# Patient Record
Sex: FEMALE | Race: WHITE | Employment: FULL TIME | URBAN - METROPOLITAN AREA
[De-identification: names, ages, dates, MRNs, and addresses within clinical notes are randomized per-mention and may not be internally consistent; named-entity substitution may affect disease eponyms.]

---

## 2017-05-02 ENCOUNTER — OFFICE VISIT (OUTPATIENT)
Dept: FAMILY MEDICINE CLINIC | Facility: CLINIC | Age: 27
End: 2017-05-02

## 2017-05-02 VITALS
HEIGHT: 62 IN | RESPIRATION RATE: 16 BRPM | DIASTOLIC BLOOD PRESSURE: 70 MMHG | BODY MASS INDEX: 22.08 KG/M2 | HEART RATE: 88 BPM | TEMPERATURE: 98 F | SYSTOLIC BLOOD PRESSURE: 118 MMHG | WEIGHT: 120 LBS

## 2017-05-02 DIAGNOSIS — Z13.220 SCREENING FOR LIPOID DISORDERS: ICD-10-CM

## 2017-05-02 DIAGNOSIS — Z13.29 SCREENING FOR THYROID DISORDER: ICD-10-CM

## 2017-05-02 DIAGNOSIS — L29.9 PRURITUS, UNSPECIFIED: ICD-10-CM

## 2017-05-02 DIAGNOSIS — Z00.00 WELL WOMAN EXAM WITHOUT GYNECOLOGICAL EXAM: Primary | ICD-10-CM

## 2017-05-02 PROCEDURE — 99395 PREV VISIT EST AGE 18-39: CPT | Performed by: FAMILY MEDICINE

## 2017-05-02 RX ORDER — CETIRIZINE HYDROCHLORIDE 10 MG/1
10 TABLET ORAL DAILY
COMMUNITY
End: 2018-07-17 | Stop reason: ALTCHOICE

## 2017-05-02 RX ORDER — ESCITALOPRAM OXALATE 10 MG/1
10 TABLET ORAL DAILY
COMMUNITY

## 2017-05-02 RX ORDER — ESCITALOPRAM OXALATE 5 MG/1
5 TABLET ORAL DAILY
COMMUNITY

## 2017-05-02 NOTE — PROGRESS NOTES
Basim Boland is a 27year old female. HPI:   ***    Current Outpatient Prescriptions:  escitalopram 5 MG Oral Tab Take 5 mg by mouth daily. Disp:  Rfl:    escitalopram 10 MG Oral Tab Take 10 mg by mouth daily.  Disp:  Rfl:    cetirizine 10 MG Oral

## 2017-05-03 NOTE — PROGRESS NOTES
HPI:   Kimberlyn Cadet is a 27year old female who presents for a complete physical exam. Symptoms: periods are regular. Patient complains of recurrent pruritic papules on her arms and legs. She states her mother did have the same condition.   She is Seasonal allergies    • Acute sinusitis, unspecified    • Acute tonsillitis    • Otalgia, unspecified    • Scoliosis (and kyphoscoliosis), idiopathic    • Acute tonsillitis 12/1/11          Past Surgical History    TONSILLECTOMY        Family History   Pro HSM or tenderness  : Done by GYN   MUSCULOSKELETAL: back is not tender,FROM of the back  EXTREMITIES: no cyanosis, clubbing or edema  NEURO: Oriented times three,cranial nerves are intact,motor and sensory are grossly intact    ASSESSMENT AND PLAN:   Rebekah Berumen

## 2017-05-13 ENCOUNTER — LAB ENCOUNTER (OUTPATIENT)
Dept: LAB | Age: 27
End: 2017-05-13
Attending: FAMILY MEDICINE
Payer: COMMERCIAL

## 2017-05-13 DIAGNOSIS — Z13.220 SCREENING FOR LIPOID DISORDERS: ICD-10-CM

## 2017-05-13 DIAGNOSIS — L29.9 PRURITUS, UNSPECIFIED: ICD-10-CM

## 2017-05-13 DIAGNOSIS — Z13.29 SCREENING FOR THYROID DISORDER: ICD-10-CM

## 2017-05-13 PROCEDURE — 36415 COLL VENOUS BLD VENIPUNCTURE: CPT | Performed by: FAMILY MEDICINE

## 2017-05-13 PROCEDURE — 80050 GENERAL HEALTH PANEL: CPT | Performed by: FAMILY MEDICINE

## 2017-05-13 PROCEDURE — 80061 LIPID PANEL: CPT | Performed by: FAMILY MEDICINE

## 2017-05-13 PROCEDURE — 84439 ASSAY OF FREE THYROXINE: CPT | Performed by: FAMILY MEDICINE

## 2017-05-13 PROCEDURE — 86003 ALLG SPEC IGE CRUDE XTRC EA: CPT | Performed by: FAMILY MEDICINE

## 2017-06-07 ENCOUNTER — TELEPHONE (OUTPATIENT)
Dept: FAMILY MEDICINE CLINIC | Facility: CLINIC | Age: 27
End: 2017-06-07

## 2017-06-07 NOTE — TELEPHONE ENCOUNTER
Pt walked in with 1-page [facility] 201 form to be completed by PCP. Pt needs completed ASAP. Pt is going to [facility]) next week and would like to have it done prior to this.  Pt is asking that the [place] be [place] [facility]

## 2017-08-12 ENCOUNTER — APPOINTMENT (OUTPATIENT)
Dept: CT IMAGING | Age: 27
End: 2017-08-12
Attending: PHYSICIAN ASSISTANT
Payer: COMMERCIAL

## 2017-08-12 ENCOUNTER — HOSPITAL ENCOUNTER (OUTPATIENT)
Age: 27
Discharge: HOME | End: 2017-08-12
Payer: COMMERCIAL

## 2017-08-12 VITALS
HEART RATE: 76 BPM | DIASTOLIC BLOOD PRESSURE: 76 MMHG | OXYGEN SATURATION: 100 % | RESPIRATION RATE: 18 BRPM | TEMPERATURE: 99 F | SYSTOLIC BLOOD PRESSURE: 113 MMHG

## 2017-08-12 DIAGNOSIS — R40.20 LOSS OF CONSCIOUSNESS (HCC): ICD-10-CM

## 2017-08-12 DIAGNOSIS — S01.81XA CHIN LACERATION, INITIAL ENCOUNTER: ICD-10-CM

## 2017-08-12 DIAGNOSIS — R55 SYNCOPE, VASOVAGAL: Primary | ICD-10-CM

## 2017-08-12 LAB
#LYMPHOCYTE IC: 2.2 X10ˆ3/UL (ref 0.9–3.2)
#MXD IC: 0.7 X10ˆ3/UL (ref 0.1–1)
#NEUTROPHIL IC: 5.8 X10ˆ3/UL (ref 1.3–6.7)
CREAT SERPL-MCNC: 0.8 MG/DL (ref 0.4–1)
GLUCOSE BLD-MCNC: 92 MG/DL (ref 65–99)
HCT IC: 39.4 % (ref 37–54)
HGB IC: 13.1 G/DL (ref 12–16)
ISTAT BLOOD GAS TCO2: 25 MMOL/L (ref 22–32)
ISTAT BUN: 9 MG/DL (ref 8–20)
ISTAT CHLORIDE: 99 MMOL/L (ref 101–111)
ISTAT HEMATOCRIT: 41 % (ref 37–54)
ISTAT IONIZED CALCIUM: 1.3 MMOL/L (ref 1.12–1.32)
ISTAT POTASSIUM: 3.9 MMOL/L (ref 3.6–5.1)
ISTAT SODIUM: 136 MMOL/L (ref 136–144)
LYMPHOCYTES NFR BLD AUTO: 25.6 %
MCH IC: 31.2 PG (ref 27–33.2)
MCHC IC: 33.2 G/DL (ref 31–37)
MCV IC: 93.8 FL (ref 81–100)
MIXED CELL %: 8.4 %
NEUTROPHILS NFR BLD AUTO: 66 %
PLT IC: 233 X10ˆ3/UL (ref 150–450)
POCT BILIRUBIN URINE: NEGATIVE
POCT BLOOD URINE: NEGATIVE
POCT GLUCOSE URINE: NEGATIVE MG/DL
POCT KETONE URINE: NEGATIVE MG/DL
POCT NITRITE URINE: NEGATIVE
POCT PH URINE: 5.5 (ref 5–8)
POCT PROTEIN URINE: NEGATIVE MG/DL
POCT SPECIFIC GRAVITY URINE: 1.02
POCT URINE CLARITY: CLEAR
POCT URINE COLOR: YELLOW
POCT URINE PREGNANCY: NEGATIVE
POCT UROBILINOGEN URINE: 0.2 MG/DL
RBC IC: 4.2 X10ˆ6/UL (ref 3.8–5.1)
WBC IC: 8.7 X10ˆ3/UL (ref 4–13)

## 2017-08-12 PROCEDURE — 70450 CT HEAD/BRAIN W/O DYE: CPT | Performed by: PHYSICIAN ASSISTANT

## 2017-08-12 PROCEDURE — 99215 OFFICE O/P EST HI 40 MIN: CPT

## 2017-08-12 PROCEDURE — 93005 ELECTROCARDIOGRAM TRACING: CPT

## 2017-08-12 PROCEDURE — 81025 URINE PREGNANCY TEST: CPT | Performed by: PHYSICIAN ASSISTANT

## 2017-08-12 PROCEDURE — 87186 SC STD MICRODIL/AGAR DIL: CPT | Performed by: PHYSICIAN ASSISTANT

## 2017-08-12 PROCEDURE — 12011 RPR F/E/E/N/L/M 2.5 CM/<: CPT

## 2017-08-12 PROCEDURE — 87077 CULTURE AEROBIC IDENTIFY: CPT | Performed by: PHYSICIAN ASSISTANT

## 2017-08-12 PROCEDURE — 80047 BASIC METABLC PNL IONIZED CA: CPT

## 2017-08-12 PROCEDURE — 87086 URINE CULTURE/COLONY COUNT: CPT | Performed by: PHYSICIAN ASSISTANT

## 2017-08-12 PROCEDURE — 93010 ELECTROCARDIOGRAM REPORT: CPT

## 2017-08-12 PROCEDURE — 81002 URINALYSIS NONAUTO W/O SCOPE: CPT | Performed by: PHYSICIAN ASSISTANT

## 2017-08-12 PROCEDURE — 96360 HYDRATION IV INFUSION INIT: CPT

## 2017-08-12 PROCEDURE — 85025 COMPLETE CBC W/AUTO DIFF WBC: CPT | Performed by: PHYSICIAN ASSISTANT

## 2017-08-12 RX ORDER — SODIUM CHLORIDE 9 MG/ML
1000 INJECTION, SOLUTION INTRAVENOUS ONCE
Status: COMPLETED | OUTPATIENT
Start: 2017-08-12 | End: 2017-08-12

## 2017-08-12 NOTE — ED PROVIDER NOTES
Patient Seen in: [facility] END    History   Patient presents with:  Syncope (cardiovascular, neurologic)  Laceration Abrasion (integumentary)    Stated Complaint: Lost Concious/fell and injured chin    HPI    Santinokolton Kathleen is a 24yo F who disability [Other] [OTHER] Mother    • mental disability [Other] [OTHER] Maternal Grandfather        Smoking status: Never Smoker                                                              Smokeless tobacco: Never Used                      Alcohol use: N all four quadrants, no mass or organomegaly.  No rebound tenderness or guarding                Lymphatic:  No adenopathy  Skin/Hair/Nails:  Skin warm, dry +2 cm v shaped gaping laceration to chin   Neurologic:   Grossly intact, no cranial nerve deficits, 5/ Brain Or Head (90902)    Result Date: 8/12/2017  PROCEDURE:  CT BRAIN OR HEAD (52072)  COMPARISON:  None. INDICATIONS:  Lost consciousness/fell and injured chin  TECHNIQUE:  Noncontrast CT scanning is performed through the brain.  Dose reduction techniques and site. Verbal consent was obtained from the patient. The  laceration was anesthetized in the usual fashion with 2mL of 1% lidocaine without epinephrine.  The wound was irrigated with normal saline, draped and explored to its base with a gloved finge

## 2017-08-12 NOTE — ED INITIAL ASSESSMENT (HPI)
Pt was running on the treadmill for 3 mins and stepped to the side. (was on the bike for 20 mins prior). States she black out then. Unsure how long she was blacked out. Laceration to chin. No active bleeding. Happened 40 mins ago.   C/o pain to chin an

## 2017-08-13 NOTE — ED NOTES
Message left for the patient to return phone call for abnormal  lab result and the need for treatment. Phone number provided.

## 2017-08-13 NOTE — ED NOTES
Patient returned phone call and was notified of her positive preliminary urine culture results. Patient notified that the physician has ordered an antibiotic. Allergies verified. Patient instructed to start Macrobid 100 mg one bid for 5 days.  Patient instr

## 2017-08-15 NOTE — ED NOTES
Patient returned our call and patient is already taking her [address]. No further action needed.

## 2017-08-19 ENCOUNTER — HOSPITAL ENCOUNTER (OUTPATIENT)
Age: 27
Discharge: HOME | End: 2017-08-19
Payer: COMMERCIAL

## 2017-08-19 VITALS
TEMPERATURE: 98 F | OXYGEN SATURATION: 98 % | RESPIRATION RATE: 18 BRPM | HEART RATE: 88 BPM | SYSTOLIC BLOOD PRESSURE: 118 MMHG | DIASTOLIC BLOOD PRESSURE: 81 MMHG

## 2017-08-19 DIAGNOSIS — Z48.02 ENCOUNTER FOR REMOVAL OF SUTURES: Primary | ICD-10-CM

## 2017-08-19 NOTE — ED INITIAL ASSESSMENT (HPI)
Here her sutural removal in chin. Had in for 7 days. No drainage or redness to area. PA removed sutures and neosporin applied by PA.

## 2017-08-19 NOTE — ED PROVIDER NOTES
SUTURE REMOVAL PROCEDURE:  PROCEDURE: Removal of previously placed sutures  LOCATION OF SUTURES: chin  SUTURES PREVIOUSLY PLACED AT: [facility]    The wound demonstrates no evidence of infection with adequate tensile strength at the wound margins at this

## 2017-09-26 ENCOUNTER — TELEPHONE (OUTPATIENT)
Dept: FAMILY MEDICINE CLINIC | Facility: CLINIC | Age: 27
End: 2017-09-26

## 2017-09-26 NOTE — TELEPHONE ENCOUNTER
States she fainted this morning and is having dizziness. Fainted earlier in the month. Transferred to Triage.

## 2017-09-26 NOTE — TELEPHONE ENCOUNTER
Had episode of syncope this morning around 6:15 and was out for a few seconds. No nausea or vomiting. Dizziness and tingling in hands and upper body. Denies fevers and headaches.   Spoke with Beverly BEATTY who advised that pt could either go to I

## 2017-09-28 ENCOUNTER — OFFICE VISIT (OUTPATIENT)
Dept: FAMILY MEDICINE CLINIC | Facility: CLINIC | Age: 27
End: 2017-09-28

## 2017-09-28 VITALS
BODY MASS INDEX: 21.62 KG/M2 | HEIGHT: 63 IN | SYSTOLIC BLOOD PRESSURE: 116 MMHG | OXYGEN SATURATION: 96 % | WEIGHT: 122 LBS | TEMPERATURE: 99 F | HEART RATE: 98 BPM | DIASTOLIC BLOOD PRESSURE: 78 MMHG | RESPIRATION RATE: 16 BRPM

## 2017-09-28 DIAGNOSIS — R55 SYNCOPE, UNSPECIFIED SYNCOPE TYPE: Primary | ICD-10-CM

## 2017-09-28 PROBLEM — Z86.19 HISTORY OF COLD SORES: Status: ACTIVE | Noted: 2017-09-28

## 2017-09-28 PROBLEM — F41.9 ANXIETY: Status: ACTIVE | Noted: 2017-09-28

## 2017-09-28 PROBLEM — E78.1 HYPERTRIGLYCERIDEMIA: Status: ACTIVE | Noted: 2017-09-28

## 2017-09-28 LAB
APPEARANCE: CLEAR
CONTROL LINE PRESENT WITH A CLEAR BACKGROUND (YES/NO): YES YES/NO
MULTISTIX LOT#: NORMAL NUMERIC
PH, URINE: 7.5 (ref 4.5–8)
SPECIFIC GRAVITY: 1.01 (ref 1–1.03)
URINE-COLOR: YELLOW
UROBILINOGEN,SEMI-QN: 0.2 MG/DL (ref 0–1.9)

## 2017-09-28 PROCEDURE — 99214 OFFICE O/P EST MOD 30 MIN: CPT | Performed by: FAMILY MEDICINE

## 2017-09-28 PROCEDURE — 81025 URINE PREGNANCY TEST: CPT | Performed by: FAMILY MEDICINE

## 2017-09-28 PROCEDURE — 93000 ELECTROCARDIOGRAM COMPLETE: CPT | Performed by: FAMILY MEDICINE

## 2017-09-28 PROCEDURE — 81003 URINALYSIS AUTO W/O SCOPE: CPT | Performed by: FAMILY MEDICINE

## 2017-09-28 NOTE — PROGRESS NOTES
[facility] Office Note  Chief Complaint:   Patient presents with:  Fainting: times 2      HPI:   This is a 24year old female coming in for evaluation of fainting x 2 episodes. First one was back in 8/12/2017.  She acquired lacerat (yes/no) yes Yes/No   Kit Lot # [id number] Numeric   Kit Expiration Date 06-30-18 Date       Past Medical History:   Diagnosis Date   • Acute sinusitis, unspecified    • Acute tonsillitis    • Acute tonsillitis 12/1/11   • Anxiety    • Chronic tonsillitis skin dryness. CARDIOVASCULAR:  + chest pain, denies chest pressure, chest discomfort, palpitations, edema, dyspnea on exertion or at rest.  RESPIRATORY:  Denies shortness of breath, wheezing, cough or sputum.   GASTROINTESTINAL:  Denies abdominal pain, ezequiel bilterally, no rales/rhonchi/wheezing. CHEST: No tenderness. ABDOMEN:  Soft, nondistended, nontender, bowel sounds normal in all 4 quadrants, no masses, no hepatosplenomegaly. BACK: No tenderness, no spasm.   EXTREMITIES:  No edema, no cyanosis, no clubb

## 2017-09-28 NOTE — PATIENT INSTRUCTIONS
Possible Causes of Dizziness or Fainting  Dizziness and fainting can have many causes. Below are some examples of possible causes your healthcare provider will look to rule out.     Benign paroxysmal positional vertigo (BPPV)  BPPV results when calcium cr Additional causes for dizziness and fainting also exist. Talk to your healthcare provider for more information.     Date Last Reviewed: 10/6/2015  © 8171-6857 The [address] [place] [address], [address] [place] [address]. All rights reserved.  This i A heart problem can decrease the amount of oxygen-rich blood that reaches the brain. Heart trouble can be serious and even life threatening if not treated:  · A slow heart rate. Electrical signals tell the chambers of the heart when to pump.  But the signal Whatever the cause of syncope, it is important to be evaluated by your healthcare provider.  You may need to be seen by a cardiologist, neurologist, or an ear, nose, and throat specialist. Do not drive, operate heavy machinery, or participate in activities · Echocardiogram. This test takes pictures of your heart. It can show heart valve or heart function problems. Or it can reveal damage from a heart attack. · Electrophysiology studies (EPS).  These help your provider find weak or damaged electrical pathways One of the concerns with syncope is being injured from falling with a loss of consciousness. Finding a cause for syncope can help keep you safe from injury.  Do not drive a car, operate heavy machinery, or engage in activities that may result in injury if y · Rest today. You may go back to your normal activities when you are feeling back to normal. It is best to stay with someone who can check on you for the next 24 hours to watch for another episode of fainting.   · If you become lightheaded or dizzy, lie gila

## 2017-09-30 ENCOUNTER — HOSPITAL ENCOUNTER (OUTPATIENT)
Dept: ULTRASOUND IMAGING | Age: 27
Discharge: HOME | End: 2017-09-30
Attending: FAMILY MEDICINE
Payer: COMMERCIAL

## 2017-09-30 ENCOUNTER — LAB ENCOUNTER (OUTPATIENT)
Dept: LAB | Age: 27
End: 2017-09-30
Attending: FAMILY MEDICINE
Payer: COMMERCIAL

## 2017-09-30 DIAGNOSIS — R55 SYNCOPE, UNSPECIFIED SYNCOPE TYPE: ICD-10-CM

## 2017-09-30 PROCEDURE — 93880 EXTRACRANIAL BILAT STUDY: CPT | Performed by: FAMILY MEDICINE

## 2017-09-30 PROCEDURE — 80053 COMPREHEN METABOLIC PANEL: CPT | Performed by: FAMILY MEDICINE

## 2017-09-30 PROCEDURE — 36415 COLL VENOUS BLD VENIPUNCTURE: CPT | Performed by: FAMILY MEDICINE

## 2017-09-30 PROCEDURE — 85025 COMPLETE CBC W/AUTO DIFF WBC: CPT | Performed by: FAMILY MEDICINE

## 2017-10-20 ENCOUNTER — HOSPITAL ENCOUNTER (OUTPATIENT)
Dept: CV DIAGNOSTICS | Age: 27
Discharge: HOME | End: 2017-10-20
Attending: FAMILY MEDICINE
Payer: COMMERCIAL

## 2017-10-20 DIAGNOSIS — R55 SYNCOPE, UNSPECIFIED SYNCOPE TYPE: ICD-10-CM

## 2017-10-20 PROCEDURE — 93350 STRESS TTE ONLY: CPT | Performed by: FAMILY MEDICINE

## 2017-10-20 PROCEDURE — 93018 CV STRESS TEST I&R ONLY: CPT | Performed by: FAMILY MEDICINE

## 2017-10-20 PROCEDURE — 93017 CV STRESS TEST TRACING ONLY: CPT | Performed by: FAMILY MEDICINE

## 2017-11-13 ENCOUNTER — TELEPHONE (OUTPATIENT)
Dept: FAMILY MEDICINE CLINIC | Facility: CLINIC | Age: 27
End: 2017-11-13

## 2017-11-13 DIAGNOSIS — E78.9 ELEVATED SERUM CHOLESTEROL: Primary | ICD-10-CM

## 2017-11-13 NOTE — TELEPHONE ENCOUNTER
Pt had labs done for her annual wellness screening at work. Is concerned that her cholesterol was high and would like to discuss with clinical staff.     LDL: 134  Total: 220

## 2017-11-14 NOTE — TELEPHONE ENCOUNTER
Call back from pt-sts she just wanted input from dr Sherita Grullon re whether he recommends rechecking cholesterol or any other recommendations for now. \"this if first time levels are elevated. Total 220 and \".   Confirms family hx of elevated cholester

## 2017-11-14 NOTE — TELEPHONE ENCOUNTER
Call to pt-advised of dr murrell's recommendations. Informed will place edwrd fasting lab orders for 6 months. Patient voices understanding/agrees with plan/no further questions. **see future lab orders pended for approval** thanks!

## 2017-12-05 ENCOUNTER — OFFICE VISIT (OUTPATIENT)
Dept: FAMILY MEDICINE CLINIC | Facility: CLINIC | Age: 27
End: 2017-12-05

## 2017-12-05 VITALS
TEMPERATURE: 98 F | WEIGHT: 125 LBS | SYSTOLIC BLOOD PRESSURE: 110 MMHG | HEART RATE: 102 BPM | BODY MASS INDEX: 23 KG/M2 | DIASTOLIC BLOOD PRESSURE: 72 MMHG | OXYGEN SATURATION: 99 % | HEIGHT: 62 IN | RESPIRATION RATE: 16 BRPM

## 2017-12-05 DIAGNOSIS — B37.3 VAGINAL CANDIDA: ICD-10-CM

## 2017-12-05 DIAGNOSIS — J01.00 ACUTE MAXILLARY SINUSITIS, RECURRENCE NOT SPECIFIED: Primary | ICD-10-CM

## 2017-12-05 DIAGNOSIS — J01.10 ACUTE FRONTAL SINUSITIS, RECURRENCE NOT SPECIFIED: ICD-10-CM

## 2017-12-05 PROCEDURE — 99213 OFFICE O/P EST LOW 20 MIN: CPT | Performed by: NURSE PRACTITIONER

## 2017-12-05 RX ORDER — FLUCONAZOLE 150 MG/1
150 TABLET ORAL ONCE
Qty: 1 TABLET | Refills: 1 | Status: SHIPPED | OUTPATIENT
Start: 2017-12-05 | End: 2017-12-05

## 2017-12-05 RX ORDER — AMOXICILLIN AND CLAVULANATE POTASSIUM 875; 125 MG/1; MG/1
1 TABLET, FILM COATED ORAL 2 TIMES DAILY
Qty: 20 TABLET | Refills: 0 | Status: SHIPPED | OUTPATIENT
Start: 2017-12-05 | End: 2017-12-15

## 2017-12-05 NOTE — PATIENT INSTRUCTIONS
·  PLAN: Augmentin, take as directed. Finish all the medication even if you feel better. · Use back up birth control protection for 30 days after antibiotics completed.   · Probiotics or yogurt daily during antibiotic use will help decrease stomach upset

## 2017-12-05 NOTE — PROGRESS NOTES
HPI:   Barry Keen is a 24year old female who presents with ill symptoms for  3  weeks. Patient reports sore throat, congestion, cough with thick yellow colored sputum, OTC cold meds have not been helping, denies fever.  Has tried cold and sinus m shortness of breath with exertion, cough as above, no pattern to cough  CARDIOVASCULAR: denies chest pain on exertion  GI: no nausea or abdominal pain, appetite down  NEURO: denies headaches    EXAM:   /72   Pulse 102   Temp 97.9 °F (36.6 °C) (Oral) oz lukewarm water), use several times daily to help decrease swelling and pain. · Teaspoon of honey at bedtime to help soothe throat. Avoid spicy or citrus/acidic foods and drinks.   · Saline nasal spray to nostrils if needed to help remove drainage or co

## 2017-12-18 ENCOUNTER — OFFICE VISIT (OUTPATIENT)
Dept: FAMILY MEDICINE CLINIC | Facility: CLINIC | Age: 27
End: 2017-12-18

## 2017-12-18 ENCOUNTER — TELEPHONE (OUTPATIENT)
Dept: FAMILY MEDICINE CLINIC | Facility: CLINIC | Age: 27
End: 2017-12-18

## 2017-12-18 DIAGNOSIS — Z02.9 ENCOUNTER FOR ADMINISTRATIVE EXAMINATIONS: Primary | ICD-10-CM

## 2017-12-18 NOTE — TELEPHONE ENCOUNTER
Pt was on Augmentin. She finished it on Friday. She now  has a rash over her chest, back and neck that is getting worse. She called her dermatologist they advised she call her PCP. Pt has no facial swelling , tongue swelling or shortness of breath.  I advis

## 2017-12-18 NOTE — TELEPHONE ENCOUNTER
Patient was seen at [facility] on 12/5/17 and given Augmentin. Patient also saw her dermatologist and stated that they told her to call her primary regarding the rash she has. States it is spreading all over her chest, back and creeping up her neck.   Please adv

## 2017-12-19 NOTE — PROGRESS NOTES
Patient presents to walk in clinic with four day history of rash to torso. Rash began while taking Amoxicillin for illness. Amox completed. Patient notes itchy, red rash, began on chest and torso, has not spread. Has used hydrocortisone for self care.     A

## 2018-06-23 ENCOUNTER — OFFICE VISIT (OUTPATIENT)
Dept: FAMILY MEDICINE CLINIC | Facility: CLINIC | Age: 28
End: 2018-06-23

## 2018-06-23 VITALS
TEMPERATURE: 98 F | HEIGHT: 62 IN | RESPIRATION RATE: 16 BRPM | DIASTOLIC BLOOD PRESSURE: 80 MMHG | BODY MASS INDEX: 21.16 KG/M2 | WEIGHT: 115 LBS | SYSTOLIC BLOOD PRESSURE: 118 MMHG | OXYGEN SATURATION: 99 % | HEART RATE: 98 BPM

## 2018-06-23 DIAGNOSIS — J02.9 SORE THROAT: Primary | ICD-10-CM

## 2018-06-23 DIAGNOSIS — J06.9 VIRAL URI: ICD-10-CM

## 2018-06-23 PROCEDURE — 87880 STREP A ASSAY W/OPTIC: CPT | Performed by: NURSE PRACTITIONER

## 2018-06-23 PROCEDURE — 99213 OFFICE O/P EST LOW 20 MIN: CPT | Performed by: NURSE PRACTITIONER

## 2018-06-23 RX ORDER — FLUTICASONE PROPIONATE 50 MCG
2 SPRAY, SUSPENSION (ML) NASAL DAILY
Qty: 1 BOTTLE | Refills: 0 | Status: SHIPPED | OUTPATIENT
Start: 2018-06-23 | End: 2018-07-17 | Stop reason: ALTCHOICE

## 2018-06-23 NOTE — PROGRESS NOTES
CHIEF COMPLAINT:   Patient presents with:  Sore Throat: 7x days. Sympt - Fatigue, headache. Taking Advil       HPI:   Lizy Ramos is a 24year old female who presents for upper respiratory symptoms for  7 days.  Patient reports sinus congestion, so /80 (BP Location: Left arm, Patient Position: Sitting, Cuff Size: adult)   Pulse 98   Temp 98.3 °F (36.8 °C) (Oral)   Resp 16   Ht 62\"   Wt 115 lb   SpO2 99%   BMI 21.03 kg/m²   GENERAL: well developed, well nourished,in no apparent distress  SKIN: You have a viral upper respiratory illness (URI), which is another term for the common cold. This illness is contagious during the first few days. It is spread through the air by coughing and sneezing.  It may also be spread by direct contact (touching the · Cough with lots of colored sputum (mucus)  · Severe headache; face, neck, or ear pain  · Difficulty swallowing due to throat pain  · Fever of 100.4°F (38°C) or higher, or as directed by your healthcare provider  Call 911  Call 911 if any of these occur:

## 2018-07-03 ENCOUNTER — OFFICE VISIT (OUTPATIENT)
Dept: FAMILY MEDICINE CLINIC | Facility: CLINIC | Age: 28
End: 2018-07-03

## 2018-07-03 VITALS
DIASTOLIC BLOOD PRESSURE: 72 MMHG | TEMPERATURE: 99 F | OXYGEN SATURATION: 97 % | WEIGHT: 115 LBS | HEART RATE: 96 BPM | SYSTOLIC BLOOD PRESSURE: 114 MMHG | HEIGHT: 62 IN | BODY MASS INDEX: 21.16 KG/M2 | RESPIRATION RATE: 20 BRPM

## 2018-07-03 DIAGNOSIS — B37.3 VAGINAL CANDIDA: Primary | ICD-10-CM

## 2018-07-03 PROCEDURE — 99212 OFFICE O/P EST SF 10 MIN: CPT | Performed by: NURSE PRACTITIONER

## 2018-07-03 RX ORDER — FLUCONAZOLE 150 MG/1
150 TABLET ORAL ONCE
Qty: 1 TABLET | Refills: 0 | Status: SHIPPED | OUTPATIENT
Start: 2018-07-03 | End: 2018-07-03

## 2018-07-03 NOTE — PROGRESS NOTES
CHIEF COMPLAINT:     Patient presents with:  Yeast Infection: itchy,red and discharge x 2 days      HPI:   Barry Keen is a 24year old female who presents with complaints of possible yeast infection.   Reports history of yeast infections and sympt GI: Denies abdominal pain, N/V/C/D.   MUSCULOSKELETAL: no arthralgia or swollen joints  LYMPH:  Denies lymphadenopathy  NEURO: Denies headaches or lightheadedness      EXAM:   /72   Pulse 96   Temp 98.7 °F (37.1 °C) (Oral)   Resp 20   Ht 62\"   Wt 11 · Apply the cream before going to bed. Lie flat after applying so that it doesn't drip out. · Do not douche or use tampons. · Don't rely on a diaphragm or condoms, since the cream may weaken them.   · Avoid intercourse if advised by your healthcare provid

## 2018-07-03 NOTE — PATIENT INSTRUCTIONS
Vaginal Infection: Yeast (Candidiasis)  Yeast infection occurs when yeast in the vagina increase and attacks the vaginal tissues. Yeast is a type of fungus. These infections are often caused by a type of yeast called Candida albicans.  Other species of ye

## 2018-07-17 ENCOUNTER — HOSPITAL ENCOUNTER (OUTPATIENT)
Dept: GENERAL RADIOLOGY | Age: 28
Discharge: HOME | End: 2018-07-17
Attending: NURSE PRACTITIONER
Payer: COMMERCIAL

## 2018-07-17 ENCOUNTER — OFFICE VISIT (OUTPATIENT)
Dept: FAMILY MEDICINE CLINIC | Facility: CLINIC | Age: 28
End: 2018-07-17
Payer: COMMERCIAL

## 2018-07-17 VITALS
TEMPERATURE: 100 F | SYSTOLIC BLOOD PRESSURE: 112 MMHG | HEIGHT: 63 IN | WEIGHT: 121 LBS | DIASTOLIC BLOOD PRESSURE: 70 MMHG | OXYGEN SATURATION: 97 % | BODY MASS INDEX: 21.44 KG/M2 | RESPIRATION RATE: 16 BRPM | HEART RATE: 111 BPM

## 2018-07-17 DIAGNOSIS — R53.83 FATIGUE, UNSPECIFIED TYPE: ICD-10-CM

## 2018-07-17 DIAGNOSIS — R00.0 TACHYCARDIA: ICD-10-CM

## 2018-07-17 DIAGNOSIS — R05.9 COUGH: ICD-10-CM

## 2018-07-17 DIAGNOSIS — J02.9 SORE THROAT: ICD-10-CM

## 2018-07-17 DIAGNOSIS — J01.00 ACUTE NON-RECURRENT MAXILLARY SINUSITIS: Primary | ICD-10-CM

## 2018-07-17 LAB
CONTROL LINE PRESENT WITH A CLEAR BACKGROUND (YES/NO): YES YES/NO
STREP GRP A CUL-SCR: NEGATIVE

## 2018-07-17 PROCEDURE — 87880 STREP A ASSAY W/OPTIC: CPT | Performed by: NURSE PRACTITIONER

## 2018-07-17 PROCEDURE — 71046 X-RAY EXAM CHEST 2 VIEWS: CPT | Performed by: NURSE PRACTITIONER

## 2018-07-17 PROCEDURE — 99214 OFFICE O/P EST MOD 30 MIN: CPT | Performed by: NURSE PRACTITIONER

## 2018-07-17 RX ORDER — AMOXICILLIN AND CLAVULANATE POTASSIUM 875; 125 MG/1; MG/1
1 TABLET, FILM COATED ORAL 2 TIMES DAILY
Qty: 20 TABLET | Refills: 0 | Status: SHIPPED | OUTPATIENT
Start: 2018-07-17 | End: 2018-07-27

## 2018-07-17 RX ORDER — BENZONATATE 100 MG/1
100 CAPSULE ORAL 3 TIMES DAILY PRN
Qty: 12 CAPSULE | Refills: 0 | Status: SHIPPED | OUTPATIENT
Start: 2018-07-17 | End: 2021-03-29

## 2018-07-17 RX ORDER — PROMETHAZINE HYDROCHLORIDE AND CODEINE PHOSPHATE 6.25; 1 MG/5ML; MG/5ML
2.5 SYRUP ORAL NIGHTLY PRN
Qty: 118 ML | Refills: 0 | Status: SHIPPED | OUTPATIENT
Start: 2018-07-17 | End: 2021-03-29

## 2018-07-17 NOTE — PROGRESS NOTES
Dick Bejarano is a 24year old female. HPI:   Patient presents to the office today reporting fatigue, cough-that is sometimes productive, sore throat and pain when swallowing, sinus congestion and sinus pressure for the past 8 days.  She reports buck allergies       Social History:  Smoking status: Never Smoker                                                              Smokeless tobacco: Never Used                      Alcohol use:  No                 REVIEW OF SYSTEMS:   GENERAL HEALTH: reports fatig Consults:  None    Follow Up with:  No follow-up provider specified. 1. Acute non-recurrent maxillary sinusitis  Augmentin as ordered. Patient instructed to take medication with food. Probiotic or organic yogurt for duration of antibiotic.   Push fluid

## 2018-07-27 ENCOUNTER — OFFICE VISIT (OUTPATIENT)
Dept: FAMILY MEDICINE CLINIC | Facility: CLINIC | Age: 28
End: 2018-07-27

## 2018-07-27 ENCOUNTER — TELEPHONE (OUTPATIENT)
Dept: FAMILY MEDICINE CLINIC | Facility: CLINIC | Age: 28
End: 2018-07-27

## 2018-07-27 DIAGNOSIS — Z02.9 ENCOUNTER FOR ADMINISTRATIVE EXAMINATIONS: Primary | ICD-10-CM

## 2018-07-27 NOTE — TELEPHONE ENCOUNTER
S/w pt. Advised her to proceed to urgent care of [facility] for this. She confirms she has been to [facility] in past for this in the past?  Reports having allergic rxn to an abx they gave her.   Per chart I see back in Dec it was noted that she had rxn to amoxicllin-wa

## 2018-07-27 NOTE — TELEPHONE ENCOUNTER
Pt was given Augmentin 7/17, can this be a late allergic rxn for pt? Hives started 2 days ago. No sob/no throat swelling. Advise antihistamine? Benadryl tonight? Steroid? Urgent care? Please advise thanks.

## 2018-07-27 NOTE — TELEPHONE ENCOUNTER
1. What are your symptoms? Hives on chest, shoulders, top of legs        2. How long have you been having these symptoms? 2 days        3. Have you done anything already to treat your symptoms?    Oatmeal bath (had not done yet), Cristina lotion        ADDIT

## 2019-05-06 ENCOUNTER — HOSPITAL ENCOUNTER (OUTPATIENT)
Dept: MRI IMAGING | Age: 29
Discharge: HOME | End: 2019-05-06
Attending: OBSTETRICS & GYNECOLOGY
Payer: COMMERCIAL

## 2019-05-06 DIAGNOSIS — N94.89 ENDOMETRIAL MASS: ICD-10-CM

## 2019-05-06 PROCEDURE — 72197 MRI PELVIS W/O & W/DYE: CPT | Performed by: OBSTETRICS & GYNECOLOGY

## 2019-05-06 PROCEDURE — A9575 INJ GADOTERATE MEGLUMI 0.1ML: HCPCS | Performed by: OBSTETRICS & GYNECOLOGY

## 2019-08-13 ENCOUNTER — HOSPITAL ENCOUNTER (OUTPATIENT)
Dept: MRI IMAGING | Age: 29
Discharge: HOME | End: 2019-08-13
Attending: OBSTETRICS & GYNECOLOGY
Payer: COMMERCIAL

## 2019-08-13 DIAGNOSIS — N80.0 ENDOMETRIOSIS OF CERVIX: ICD-10-CM

## 2019-08-13 PROCEDURE — 72197 MRI PELVIS W/O & W/DYE: CPT | Performed by: OBSTETRICS & GYNECOLOGY

## 2019-08-13 PROCEDURE — A9575 INJ GADOTERATE MEGLUMI 0.1ML: HCPCS

## 2019-09-20 ENCOUNTER — WALK IN (OUTPATIENT)
Dept: URGENT CARE | Age: 29
End: 2019-09-20

## 2019-09-20 VITALS
WEIGHT: 115 LBS | OXYGEN SATURATION: 98 % | DIASTOLIC BLOOD PRESSURE: 72 MMHG | HEART RATE: 100 BPM | TEMPERATURE: 99.2 F | SYSTOLIC BLOOD PRESSURE: 108 MMHG | HEIGHT: 62 IN | RESPIRATION RATE: 18 BRPM | BODY MASS INDEX: 21.16 KG/M2

## 2019-09-20 DIAGNOSIS — J06.9 URI WITH COUGH AND CONGESTION: ICD-10-CM

## 2019-09-20 DIAGNOSIS — J02.0 STREP PHARYNGITIS: Primary | ICD-10-CM

## 2019-09-20 LAB
INTERNAL PROCEDURAL CONTROLS ACCEPTABLE: YES
S PYO AG THROAT QL IA.RAPID: POSITIVE

## 2019-09-20 PROCEDURE — 87880 STREP A ASSAY W/OPTIC: CPT | Performed by: NURSE PRACTITIONER

## 2019-09-20 PROCEDURE — 99203 OFFICE O/P NEW LOW 30 MIN: CPT | Performed by: NURSE PRACTITIONER

## 2019-09-20 RX ORDER — AZITHROMYCIN 250 MG/1
TABLET, FILM COATED ORAL
Qty: 6 TABLET | Refills: 0 | Status: SHIPPED | OUTPATIENT
Start: 2019-09-20

## 2019-09-20 RX ORDER — PREDNISONE 20 MG/1
40 TABLET ORAL DAILY
Qty: 10 TABLET | Refills: 0 | Status: SHIPPED | OUTPATIENT
Start: 2019-09-20 | End: 2019-09-25

## 2019-09-20 SDOH — HEALTH STABILITY: MENTAL HEALTH: HOW OFTEN DO YOU HAVE A DRINK CONTAINING ALCOHOL?: MONTHLY OR LESS

## 2019-09-20 ASSESSMENT — ENCOUNTER SYMPTOMS
PHOTOPHOBIA: 0
LIGHT-HEADEDNESS: 0
FACIAL SWELLING: 0
HEADACHES: 0
EYE PAIN: 0
DIARRHEA: 0
DIAPHORESIS: 0
CHEST TIGHTNESS: 0
EYE REDNESS: 0
APPETITE CHANGE: 1
SHORTNESS OF BREATH: 0
STRIDOR: 0
DIZZINESS: 0
ABDOMINAL PAIN: 0
SINUS PAIN: 0
SINUS PRESSURE: 0
RHINORRHEA: 0
CHILLS: 0
NAUSEA: 0
FEVER: 0
CHOKING: 0
VOMITING: 0
COLOR CHANGE: 0
COUGH: 1
EYE DISCHARGE: 0
SORE THROAT: 1
EYE ITCHING: 0
TROUBLE SWALLOWING: 0
APNEA: 0
WHEEZING: 0
WEAKNESS: 0

## 2019-12-16 ENCOUNTER — WALK IN (OUTPATIENT)
Dept: URGENT CARE | Age: 29
End: 2019-12-16

## 2019-12-16 VITALS
WEIGHT: 113.76 LBS | SYSTOLIC BLOOD PRESSURE: 116 MMHG | BODY MASS INDEX: 20.93 KG/M2 | OXYGEN SATURATION: 99 % | DIASTOLIC BLOOD PRESSURE: 80 MMHG | HEART RATE: 96 BPM | TEMPERATURE: 98.6 F | RESPIRATION RATE: 16 BRPM | HEIGHT: 62 IN

## 2019-12-16 DIAGNOSIS — J01.00 ACUTE NON-RECURRENT MAXILLARY SINUSITIS: Primary | ICD-10-CM

## 2019-12-16 DIAGNOSIS — J02.9 PHARYNGITIS, UNSPECIFIED ETIOLOGY: ICD-10-CM

## 2019-12-16 LAB
INTERNAL PROCEDURAL CONTROLS ACCEPTABLE: YES
S PYO AG THROAT QL IA.RAPID: NEGATIVE

## 2019-12-16 PROCEDURE — 99214 OFFICE O/P EST MOD 30 MIN: CPT | Performed by: NURSE PRACTITIONER

## 2019-12-16 PROCEDURE — 87880 STREP A ASSAY W/OPTIC: CPT | Performed by: NURSE PRACTITIONER

## 2019-12-16 RX ORDER — AZITHROMYCIN 250 MG/1
TABLET, FILM COATED ORAL
Qty: 6 TABLET | Refills: 0 | Status: SHIPPED | OUTPATIENT
Start: 2019-12-16

## 2019-12-16 RX ORDER — PREDNISONE 20 MG/1
40 TABLET ORAL DAILY
Qty: 10 TABLET | Refills: 0 | Status: SHIPPED | OUTPATIENT
Start: 2019-12-16 | End: 2019-12-21

## 2019-12-16 RX ORDER — VALACYCLOVIR HYDROCHLORIDE 500 MG/1
500 TABLET, FILM COATED ORAL 2 TIMES DAILY
COMMUNITY

## 2019-12-16 ASSESSMENT — ENCOUNTER SYMPTOMS
SINUS PRESSURE: 1
CHILLS: 0
EYE PAIN: 0
SORE THROAT: 1
COUGH: 1
WHEEZING: 0
HEADACHES: 1
APPETITE CHANGE: 1
WEAKNESS: 0
EYE DISCHARGE: 0
EYE ITCHING: 0
CHEST TIGHTNESS: 0
VOMITING: 0
CONSTIPATION: 0
STRIDOR: 0
FEVER: 0
DIARRHEA: 0
ADENOPATHY: 0
RHINORRHEA: 0
NAUSEA: 0
ABDOMINAL PAIN: 0
TROUBLE SWALLOWING: 0
EYE REDNESS: 0
PHOTOPHOBIA: 0
FATIGUE: 0
SHORTNESS OF BREATH: 0
SINUS PAIN: 1
ACTIVITY CHANGE: 0

## 2020-02-24 ENCOUNTER — OFFICE VISIT (OUTPATIENT)
Dept: FAMILY MEDICINE CLINIC | Facility: CLINIC | Age: 30
End: 2020-02-24
Payer: COMMERCIAL

## 2020-02-24 VITALS
TEMPERATURE: 99 F | DIASTOLIC BLOOD PRESSURE: 74 MMHG | RESPIRATION RATE: 20 BRPM | OXYGEN SATURATION: 99 % | HEART RATE: 100 BPM | WEIGHT: 120 LBS | HEIGHT: 62 IN | BODY MASS INDEX: 22.08 KG/M2 | SYSTOLIC BLOOD PRESSURE: 116 MMHG

## 2020-02-24 DIAGNOSIS — J02.9 SORE THROAT: ICD-10-CM

## 2020-02-24 DIAGNOSIS — R68.89 FLU-LIKE SYMPTOMS: ICD-10-CM

## 2020-02-24 DIAGNOSIS — J10.1 INFLUENZA A: Primary | ICD-10-CM

## 2020-02-24 DIAGNOSIS — J02.0 STREP PHARYNGITIS: ICD-10-CM

## 2020-02-24 LAB
CONTROL LINE PRESENT WITH A CLEAR BACKGROUND (YES/NO): YES YES/NO
KIT LOT #: NORMAL NUMERIC
OPERATOR ID: ABNORMAL
POCT INFLUENZA A: POSITIVE
POCT INFLUENZA B: NEGATIVE
STREP GRP A CUL-SCR: POSITIVE

## 2020-02-24 PROCEDURE — 99213 OFFICE O/P EST LOW 20 MIN: CPT | Performed by: NURSE PRACTITIONER

## 2020-02-24 PROCEDURE — 87502 INFLUENZA DNA AMP PROBE: CPT | Performed by: NURSE PRACTITIONER

## 2020-02-24 PROCEDURE — 87880 STREP A ASSAY W/OPTIC: CPT | Performed by: NURSE PRACTITIONER

## 2020-02-24 RX ORDER — OSELTAMIVIR PHOSPHATE 75 MG/1
75 CAPSULE ORAL 2 TIMES DAILY
Qty: 10 CAPSULE | Refills: 0 | Status: SHIPPED | OUTPATIENT
Start: 2020-02-24 | End: 2020-02-29

## 2020-02-24 RX ORDER — AZITHROMYCIN 250 MG/1
TABLET, FILM COATED ORAL
Qty: 6 TABLET | Refills: 0 | Status: SHIPPED | OUTPATIENT
Start: 2020-02-24 | End: 2020-09-28

## 2020-02-24 NOTE — PATIENT INSTRUCTIONS
STREP THROAT INSTRUCTIONS    · Can use over the countert Tylenol/Motrin prn. · Push fluids- warm or cool liquids, whichever is soothing for patient  · Warm salt water gargles 2 times per day for at least 3 days.   · Do not share utensils or drinks with a Follow-up care  Follow up with your healthcare provider or our staff if you don't get better over the next week.   When to seek medical advice  Call your healthcare provider right away if any of these occur:  · Fever of 100.4ºF (38ºC) or higher, or as direc Symptoms of the flu may be mild or severe. They can include extreme tiredness (wanting to stay in bed all day), chills, fevers, muscle aches, soreness with eye movement, headache, and a dry, hacking cough.   Home care  Follow these guidelines when caring fo · Severe weakness or dizziness  · You get a new fever or cough after getting better for a few days  Date Last Reviewed: 1/1/2017  © 4105-8208 [employer]. [address] [facility], [address] [place] [address]. All rights reserved.  This information is not

## 2020-02-24 NOTE — PROGRESS NOTES
Patient presents with:  Flu: body ache, fatigue, chills, night sweats, sore throat, congestion, dry cough , headache x last yesterday  taken OTC   :    HPI:   Nae Warren is a 34year old female who presents for upper respiratory symptoms for 1 da • benzonatate 100 MG Oral Cap Take 1 capsule (100 mg total) by mouth 3 (three) times daily as needed.  (Patient not taking: Reported on 2/24/2020 ) 12 capsule 0   • promethazine-codeine 6.25-10 MG/5ML Oral Syrup Take 2.5 mL by mouth nightly as needed for co GENERAL: well developed, well nourished, in no apparent distress, acutely sick. SKIN: no rashes,no suspicious lesions, flushed. Warm to touch.   HEAD: atraumatic, normocephalic,  no tenderness on palpation of maxillary sinuses  EYES: conjunctiva clear  EA Comfort care as described in Patient Instructions  Complications of influenza discussed including secondary infections like AOM, bronchitis, PNA, sinusitis. To be rechecked if exhibiting any symptoms of these illnesses.    Rest, increase fluids,ibuprofen/t · No work or school for the first 2 days of taking the antibiotics. After this time, you will not be contagious. You can then return to school or work if you are feeling better.   · Take antibiotic medicine for the full 10 days, even if you feel better.  Cayrn Schmidt · Don’t smoke, and stay away from secondhand smoke. Date Last Reviewed: 11/1/2017  © 3860-8956 [employer] [address]. [address] [facility] [place], [facility] [place]. All rights reserved.  This information is not intended as a substitute for professional med · Over-the-counter cold medicines will not make the flu go away faster. But the medicines may help with coughing, sore throat, and congestion in your nose and sinuses. Don’t use a decongestant if you have high blood pressure.   · Stay home until your fever

## 2020-09-28 ENCOUNTER — OFFICE VISIT (OUTPATIENT)
Dept: FAMILY MEDICINE CLINIC | Facility: CLINIC | Age: 30
End: 2020-09-28
Payer: COMMERCIAL

## 2020-09-28 VITALS
WEIGHT: 124 LBS | HEART RATE: 93 BPM | TEMPERATURE: 98 F | OXYGEN SATURATION: 96 % | BODY MASS INDEX: 22.82 KG/M2 | HEIGHT: 62 IN | RESPIRATION RATE: 16 BRPM | SYSTOLIC BLOOD PRESSURE: 118 MMHG | DIASTOLIC BLOOD PRESSURE: 82 MMHG

## 2020-09-28 DIAGNOSIS — J06.9 VIRAL UPPER RESPIRATORY ILLNESS: ICD-10-CM

## 2020-09-28 DIAGNOSIS — J02.9 SORE THROAT: Primary | ICD-10-CM

## 2020-09-28 DIAGNOSIS — R19.7 DIARRHEA, UNSPECIFIED TYPE: ICD-10-CM

## 2020-09-28 LAB
CONTROL LINE PRESENT WITH A CLEAR BACKGROUND (YES/NO): YES YES/NO
KIT LOT #: NORMAL NUMERIC

## 2020-09-28 PROCEDURE — 87880 STREP A ASSAY W/OPTIC: CPT | Performed by: NURSE PRACTITIONER

## 2020-09-28 PROCEDURE — 3074F SYST BP LT 130 MM HG: CPT | Performed by: NURSE PRACTITIONER

## 2020-09-28 PROCEDURE — 3079F DIAST BP 80-89 MM HG: CPT | Performed by: NURSE PRACTITIONER

## 2020-09-28 PROCEDURE — 87081 CULTURE SCREEN ONLY: CPT | Performed by: NURSE PRACTITIONER

## 2020-09-28 PROCEDURE — 3008F BODY MASS INDEX DOCD: CPT | Performed by: NURSE PRACTITIONER

## 2020-09-28 PROCEDURE — 99213 OFFICE O/P EST LOW 20 MIN: CPT | Performed by: NURSE PRACTITIONER

## 2020-09-28 NOTE — PATIENT INSTRUCTIONS
Coronavirus Disease 2019 (COVID-19)     [facility] is committed to the safety and well-being of our patients, members, employees, and communities.  As concerns arise about the new strain of coronavirus that causes COVID-19, [facility] 4. If you have a medical appointment, call the healthcare provider ahead of time and tell them that you have or may have COVID-19.  5. For medical emergencies, call 911 and notify the dispatch personnel that you have or may have COVID-19.   6. Cover your c · At least 10 days have passed since symptoms first appeared OR if asymptomatic patient or date of symptom onset is unclear then use 10 days post COVID test date.    · At least 20 days have passed for severe illness (requiring hospitalization) OR if you are *Some people will be required to have a repeat COVID-19 test in order to be eligible to donate. If you’re instructed by Brenda Waller that a repeat test is required, please contact the [facility] COVID-19 Nurse Triage Line at [phone].     Additional Inf · Return to your normal diet slowly. You may want to eat bland foods at first, such as rice and toast. Also, you may need to stay away from certain foods for a while, such as dairy products. These can make symptoms worse.  Ask your healthcare provider if th

## 2020-09-28 NOTE — PROGRESS NOTES
CHIEF COMPLAINT:   Patient presents with:  Sore Throat: tired, bodyaches, sore throat, swollen glands, cold sores, BL ear pain, nasal congestion, x thursday      HPI:   Lia Landers is a 31year old female presents to clinic with complaint of sor Alcohol use: No    Drug use: No       REVIEW OF SYSTEMS:   GENERAL HEALTH: feels well otherwise, good appetite  SKIN: denies any unusual skin lesions or rashes  HEENT: denies ear pain, See HPI  RESPIRATORY: denies shortness of breath or wheezing  CARDIOV Rapid strep screen is negative   1. Sore throat  - STREP A ASSAY W/OPTIC  - GRP A STREP CULT, THROAT  - SARS-COV-2 BY PCR (); Future    2. Viral upper respiratory illness  - SARS-COV-2 BY PCR (); Future    3.  Diarrhea, unspecified type  - SARS-CO * You shared eating or drinking utensils  * They sneezed, coughed, or somehow got respiratory droplets on you    Patients with pending COVID-19 test results should follow all care and home isolation instructions.   Your test results will be called to you fr If you are awaiting test results or are confirmed positive for COVID -19, and your symptoms worsen at home with symptoms such as: extreme weakness, difficult breathing, or unrelenting fevers greater than 100.4 degrees Fahrenheit, you should contact your he Alondra Zavala, in conjunction with Nataliia Amaya, is looking for patients who have recovered from COVID-19 and would be interested in donating plasma.     Convalescent plasma is a component of blood that, in people who have recovered from COVID-19, https://www.[employer].com/  https://www.cdc.gov/coronavirus/2019-ncov/    Treating Diarrhea    Diarrhea happens when you have loose, watery, or frequent bowel movements.  It is a common © 8432-9319 [employer] [address]. [address] [facility], [address]. All rights reserved. This information is not intended as a substitute for professional medical care. Always follow your healthcare professional's instructions.             The

## 2020-09-29 ENCOUNTER — APPOINTMENT (OUTPATIENT)
Dept: LAB | Age: 30
End: 2020-09-29
Attending: NURSE PRACTITIONER
Payer: COMMERCIAL

## 2020-09-29 DIAGNOSIS — J06.9 VIRAL UPPER RESPIRATORY ILLNESS: ICD-10-CM

## 2020-09-29 DIAGNOSIS — R19.7 DIARRHEA, UNSPECIFIED TYPE: ICD-10-CM

## 2020-09-29 DIAGNOSIS — J02.9 SORE THROAT: ICD-10-CM

## 2021-03-29 ENCOUNTER — OFFICE VISIT (OUTPATIENT)
Dept: FAMILY MEDICINE CLINIC | Facility: CLINIC | Age: 31
End: 2021-03-29
Payer: COMMERCIAL

## 2021-03-29 VITALS
RESPIRATION RATE: 16 BRPM | SYSTOLIC BLOOD PRESSURE: 108 MMHG | HEIGHT: 63 IN | WEIGHT: 122 LBS | TEMPERATURE: 97 F | HEART RATE: 92 BPM | DIASTOLIC BLOOD PRESSURE: 74 MMHG | BODY MASS INDEX: 21.62 KG/M2

## 2021-03-29 DIAGNOSIS — R14.0 BLOATING: ICD-10-CM

## 2021-03-29 DIAGNOSIS — Z00.00 ROUTINE GENERAL MEDICAL EXAMINATION AT A HEALTH CARE FACILITY: Primary | ICD-10-CM

## 2021-03-29 DIAGNOSIS — Z13.89 SCREENING FOR GENITOURINARY CONDITION: ICD-10-CM

## 2021-03-29 DIAGNOSIS — Z23 NEED FOR VACCINATION: ICD-10-CM

## 2021-03-29 DIAGNOSIS — Z00.00 LABORATORY EXAMINATION ORDERED AS PART OF A ROUTINE GENERAL MEDICAL EXAMINATION: ICD-10-CM

## 2021-03-29 DIAGNOSIS — R12 HEARTBURN: ICD-10-CM

## 2021-03-29 PROCEDURE — 3074F SYST BP LT 130 MM HG: CPT | Performed by: NURSE PRACTITIONER

## 2021-03-29 PROCEDURE — 3078F DIAST BP <80 MM HG: CPT | Performed by: NURSE PRACTITIONER

## 2021-03-29 PROCEDURE — 3008F BODY MASS INDEX DOCD: CPT | Performed by: NURSE PRACTITIONER

## 2021-03-29 PROCEDURE — 99213 OFFICE O/P EST LOW 20 MIN: CPT | Performed by: NURSE PRACTITIONER

## 2021-03-29 PROCEDURE — 90715 TDAP VACCINE 7 YRS/> IM: CPT | Performed by: NURSE PRACTITIONER

## 2021-03-29 PROCEDURE — 99395 PREV VISIT EST AGE 18-39: CPT | Performed by: NURSE PRACTITIONER

## 2021-03-29 PROCEDURE — 90471 IMMUNIZATION ADMIN: CPT | Performed by: NURSE PRACTITIONER

## 2021-03-29 NOTE — H&P
HPI:   Iqra Fields is a 31year old female who presents for a complete physical exam. Symptoms: denies discharge, itching, burning or dysuria, no periods, on continuous birth control.  Patient denies any history of abnormal pap smear- last pap smea • GILDESS 1/20 1-20 MG-MCG Oral Tab   3      Past Medical History:   Diagnosis Date   • Acute sinusitis, unspecified    • Acute tonsillitis    • Acute tonsillitis 12/1/11   • Anxiety    • Chronic tonsillitis 8/23/2015   • History of cold sores 9/28/2017 bleeding abnormalities  ENDOCRINE: denies temperature intolerance, polyuria, or excessive sweating.   LYMPHATICS: denies swollen glands  EXAM:   /74   Pulse 92   Temp 97.3 °F (36.3 °C)   Resp 16   Ht 5' 3\" (1.6 m)   Wt 122 lb (55.3 kg)   BMI 21.61 kg H. Pylori test.  Will check celiac panel. GI referral.  - US ABDOMEN COMPLETE (CPT=76700); Future  - CELIAC DISEASE COMPREHENSIVE  - GASTRO - INTERNAL  - H. PYLORI STOOL AG, EIA; Future    4. Heartburn  - GASTRO - INTERNAL  - H. PYLORI STOOL AG, EIA;  Futur

## 2021-03-30 ENCOUNTER — HOSPITAL ENCOUNTER (OUTPATIENT)
Dept: ULTRASOUND IMAGING | Age: 31
Discharge: HOME | End: 2021-03-30
Attending: NURSE PRACTITIONER
Payer: COMMERCIAL

## 2021-03-30 ENCOUNTER — TELEPHONE (OUTPATIENT)
Dept: FAMILY MEDICINE CLINIC | Facility: CLINIC | Age: 31
End: 2021-03-30

## 2021-03-30 ENCOUNTER — LAB ENCOUNTER (OUTPATIENT)
Dept: LAB | Age: 31
End: 2021-03-30
Attending: NURSE PRACTITIONER
Payer: COMMERCIAL

## 2021-03-30 DIAGNOSIS — E78.00 HYPERCHOLESTEROLEMIA: Primary | ICD-10-CM

## 2021-03-30 DIAGNOSIS — Z00.00 LABORATORY EXAMINATION ORDERED AS PART OF A ROUTINE GENERAL MEDICAL EXAMINATION: ICD-10-CM

## 2021-03-30 DIAGNOSIS — R14.0 BLOATING: ICD-10-CM

## 2021-03-30 DIAGNOSIS — Z00.00 ROUTINE GENERAL MEDICAL EXAMINATION AT A HEALTH CARE FACILITY: ICD-10-CM

## 2021-03-30 DIAGNOSIS — Z13.89 SCREENING FOR GENITOURINARY CONDITION: ICD-10-CM

## 2021-03-30 DIAGNOSIS — R12 HEARTBURN: ICD-10-CM

## 2021-03-30 LAB — IGA SERPL-MCNC: 89.8 MG/DL (ref 70–312)

## 2021-03-30 PROCEDURE — 86256 FLUORESCENT ANTIBODY TITER: CPT | Performed by: NURSE PRACTITIONER

## 2021-03-30 PROCEDURE — 80061 LIPID PANEL: CPT

## 2021-03-30 PROCEDURE — 76700 US EXAM ABDOM COMPLETE: CPT | Performed by: NURSE PRACTITIONER

## 2021-03-30 PROCEDURE — 82784 ASSAY IGA/IGD/IGG/IGM EACH: CPT | Performed by: NURSE PRACTITIONER

## 2021-03-30 PROCEDURE — 81003 URINALYSIS AUTO W/O SCOPE: CPT

## 2021-03-30 PROCEDURE — 84443 ASSAY THYROID STIM HORMONE: CPT

## 2021-03-30 PROCEDURE — 87338 HPYLORI STOOL AG IA: CPT

## 2021-03-30 PROCEDURE — 36415 COLL VENOUS BLD VENIPUNCTURE: CPT | Performed by: NURSE PRACTITIONER

## 2021-03-30 PROCEDURE — 85025 COMPLETE CBC W/AUTO DIFF WBC: CPT

## 2021-03-30 PROCEDURE — 83516 IMMUNOASSAY NONANTIBODY: CPT | Performed by: NURSE PRACTITIONER

## 2021-03-30 PROCEDURE — 80053 COMPREHEN METABOLIC PANEL: CPT

## 2021-03-30 PROCEDURE — 84439 ASSAY OF FREE THYROXINE: CPT

## 2021-03-30 NOTE — TELEPHONE ENCOUNTER
Patient signed medical records request form in office to receive records from Soraya Brown M.D. & Markie. Request form faxed to [phone] on 3/30/21. Request form also sent to scanning to be scanned to patient chart.

## 2021-04-26 ENCOUNTER — MED REC SCAN ONLY (OUTPATIENT)
Dept: FAMILY MEDICINE CLINIC | Facility: CLINIC | Age: 31
End: 2021-04-26

## 2021-06-04 ENCOUNTER — HOSPITAL ENCOUNTER (OUTPATIENT)
Dept: GENERAL RADIOLOGY | Age: 31
Discharge: HOME | End: 2021-06-04
Attending: INTERNAL MEDICINE
Payer: COMMERCIAL

## 2021-06-04 DIAGNOSIS — R19.4 CHANGE IN BOWEL HABITS: ICD-10-CM

## 2021-06-04 DIAGNOSIS — R14.0 BLOATING: ICD-10-CM

## 2021-06-04 PROCEDURE — 74018 RADEX ABDOMEN 1 VIEW: CPT | Performed by: INTERNAL MEDICINE

## 2021-06-06 ENCOUNTER — HOSPITAL ENCOUNTER (OUTPATIENT)
Dept: GENERAL RADIOLOGY | Age: 31
Discharge: HOME | End: 2021-06-06
Attending: INTERNAL MEDICINE
Payer: COMMERCIAL

## 2021-06-06 DIAGNOSIS — R19.4 CHANGE IN BOWEL HABITS: ICD-10-CM

## 2021-06-06 DIAGNOSIS — R14.0 BLOATING: ICD-10-CM

## 2021-06-06 PROCEDURE — 74018 RADEX ABDOMEN 1 VIEW: CPT | Performed by: INTERNAL MEDICINE

## 2021-12-28 ENCOUNTER — HOSPITAL ENCOUNTER (OUTPATIENT)
Age: 31
Discharge: HOME | End: 2021-12-28
Payer: COMMERCIAL

## 2021-12-28 VITALS
HEART RATE: 101 BPM | HEIGHT: 62 IN | SYSTOLIC BLOOD PRESSURE: 121 MMHG | RESPIRATION RATE: 16 BRPM | BODY MASS INDEX: 23 KG/M2 | TEMPERATURE: 99 F | OXYGEN SATURATION: 98 % | DIASTOLIC BLOOD PRESSURE: 78 MMHG | WEIGHT: 125 LBS

## 2021-12-28 DIAGNOSIS — B34.9 VIRAL ILLNESS: ICD-10-CM

## 2021-12-28 DIAGNOSIS — Z20.822 ENCOUNTER FOR LABORATORY TESTING FOR COVID-19 VIRUS: Primary | ICD-10-CM

## 2021-12-28 PROCEDURE — 99213 OFFICE O/P EST LOW 20 MIN: CPT | Performed by: NURSE PRACTITIONER

## 2021-12-29 NOTE — ED PROVIDER NOTES
Patient Seen in: [facility]      History   Patient presents with:  Chest Congestion  Post Nasal Drip    Stated Complaint: Covid Exposure- Runny Nose, [place], Fatigue, Taste Issues, Sore Throat    Subjective:   33-year-old female presents to Social History    Tobacco Use      Smoking status: Never Smoker      Smokeless tobacco: Never Used    Alcohol use: Yes      Comment: Very rarely    Drug use: Never             Review of Systems    Positive for stated complaint: Covid Exposur COVID-19. COVID-19 testing was done and is pending. Patient instructed to quarantine until results have been given. Take over-the-counter ibuprofen/Tylenol for pain. Take over-the-counter antihistamine and cough suppressant as needed.   To follow with p

## 2021-12-31 LAB — SARS-COV-2 RNA RESP QL NAA+PROBE: NOT DETECTED

## 2022-07-20 ENCOUNTER — OFFICE VISIT (OUTPATIENT)
Dept: FAMILY MEDICINE CLINIC | Facility: CLINIC | Age: 32
End: 2022-07-20
Payer: COMMERCIAL

## 2022-07-20 VITALS
BODY MASS INDEX: 22.43 KG/M2 | OXYGEN SATURATION: 98 % | WEIGHT: 125 LBS | SYSTOLIC BLOOD PRESSURE: 124 MMHG | DIASTOLIC BLOOD PRESSURE: 78 MMHG | HEART RATE: 80 BPM | RESPIRATION RATE: 16 BRPM | HEIGHT: 62.5 IN | TEMPERATURE: 98 F

## 2022-07-20 DIAGNOSIS — E78.1 HYPERTRIGLYCERIDEMIA: ICD-10-CM

## 2022-07-20 DIAGNOSIS — F41.9 ANXIETY: ICD-10-CM

## 2022-07-20 DIAGNOSIS — R53.83 OTHER FATIGUE: ICD-10-CM

## 2022-07-20 DIAGNOSIS — Z00.00 ANNUAL PHYSICAL EXAM: Primary | ICD-10-CM

## 2022-07-20 PROCEDURE — 3078F DIAST BP <80 MM HG: CPT | Performed by: FAMILY MEDICINE

## 2022-07-20 PROCEDURE — 3008F BODY MASS INDEX DOCD: CPT | Performed by: FAMILY MEDICINE

## 2022-07-20 PROCEDURE — 99385 PREV VISIT NEW AGE 18-39: CPT | Performed by: FAMILY MEDICINE

## 2022-07-20 PROCEDURE — 3074F SYST BP LT 130 MM HG: CPT | Performed by: FAMILY MEDICINE

## 2022-07-22 ENCOUNTER — NURSE ONLY (OUTPATIENT)
Dept: FAMILY MEDICINE CLINIC | Facility: CLINIC | Age: 32
End: 2022-07-22
Payer: COMMERCIAL

## 2022-07-22 DIAGNOSIS — Z00.00 ANNUAL PHYSICAL EXAM: ICD-10-CM

## 2022-07-22 DIAGNOSIS — E78.1 HYPERTRIGLYCERIDEMIA: ICD-10-CM

## 2022-07-22 DIAGNOSIS — R53.83 OTHER FATIGUE: ICD-10-CM

## 2022-07-22 DIAGNOSIS — F41.9 ANXIETY: ICD-10-CM

## 2022-07-22 LAB
ALBUMIN SERPL-MCNC: 4 G/DL (ref 3.4–5)
ALBUMIN/GLOB SERPL: 1.2 {RATIO} (ref 1–2)
ALP LIVER SERPL-CCNC: 48 U/L
ALT SERPL-CCNC: 29 U/L
ANION GAP SERPL CALC-SCNC: 5 MMOL/L (ref 0–18)
AST SERPL-CCNC: 13 U/L (ref 15–37)
BILIRUB SERPL-MCNC: 0.3 MG/DL (ref 0.1–2)
BUN BLD-MCNC: 10 MG/DL (ref 7–18)
CALCIUM BLD-MCNC: 9.1 MG/DL (ref 8.5–10.1)
CHLORIDE SERPL-SCNC: 108 MMOL/L (ref 98–112)
CHOLEST SERPL-MCNC: 177 MG/DL (ref ?–200)
CO2 SERPL-SCNC: 24 MMOL/L (ref 21–32)
CREAT BLD-MCNC: 0.82 MG/DL
ERYTHROCYTE [DISTWIDTH] IN BLOOD BY AUTOMATED COUNT: 13 %
FASTING PATIENT LIPID ANSWER: YES
FASTING STATUS PATIENT QL REPORTED: YES
GLOBULIN PLAS-MCNC: 3.3 G/DL (ref 2.8–4.4)
GLUCOSE BLD-MCNC: 91 MG/DL (ref 70–99)
HCT VFR BLD AUTO: 42.4 %
HDLC SERPL-MCNC: 67 MG/DL (ref 40–59)
HGB BLD-MCNC: 13.7 G/DL
LDLC SERPL CALC-MCNC: 91 MG/DL (ref ?–100)
MAGNESIUM SERPL-MCNC: 2 MG/DL (ref 1.6–2.6)
MCH RBC QN AUTO: 31.7 PG (ref 26–34)
MCHC RBC AUTO-ENTMCNC: 32.3 G/DL (ref 31–37)
MCV RBC AUTO: 98.1 FL
NONHDLC SERPL-MCNC: 110 MG/DL (ref ?–130)
OSMOLALITY SERPL CALC.SUM OF ELEC: 283 MOSM/KG (ref 275–295)
PLATELET # BLD AUTO: 229 10(3)UL (ref 150–450)
POTASSIUM SERPL-SCNC: 4.6 MMOL/L (ref 3.5–5.1)
PROT SERPL-MCNC: 7.3 G/DL (ref 6.4–8.2)
RBC # BLD AUTO: 4.32 X10(6)UL
SODIUM SERPL-SCNC: 137 MMOL/L (ref 136–145)
TRIGL SERPL-MCNC: 105 MG/DL (ref 30–149)
TSI SER-ACNC: 1.23 MIU/ML (ref 0.36–3.74)
VIT B12 SERPL-MCNC: 453 PG/ML (ref 193–986)
VIT D+METAB SERPL-MCNC: 51.5 NG/ML (ref 30–100)
VLDLC SERPL CALC-MCNC: 17 MG/DL (ref 0–30)
WBC # BLD AUTO: 7.8 X10(3) UL (ref 4–11)

## 2022-07-22 PROCEDURE — 84443 ASSAY THYROID STIM HORMONE: CPT | Performed by: FAMILY MEDICINE

## 2022-07-22 PROCEDURE — 80053 COMPREHEN METABOLIC PANEL: CPT | Performed by: FAMILY MEDICINE

## 2022-07-22 PROCEDURE — 82607 VITAMIN B-12: CPT | Performed by: FAMILY MEDICINE

## 2022-07-22 PROCEDURE — 83735 ASSAY OF MAGNESIUM: CPT | Performed by: FAMILY MEDICINE

## 2022-07-22 PROCEDURE — 85027 COMPLETE CBC AUTOMATED: CPT | Performed by: FAMILY MEDICINE

## 2022-07-22 PROCEDURE — 80061 LIPID PANEL: CPT | Performed by: FAMILY MEDICINE

## 2022-07-22 PROCEDURE — 82306 VITAMIN D 25 HYDROXY: CPT | Performed by: FAMILY MEDICINE

## 2022-07-22 NOTE — PATIENT INSTRUCTIONS
Unable to draw Zinc level because we do not have the correct tubing/supplies. Discussed with patient and she decided to proceed with the other labs today. She will call the reference lab to schedule an appointment for the Zinc level.

## 2022-09-08 ENCOUNTER — OFFICE VISIT (OUTPATIENT)
Dept: FAMILY MEDICINE CLINIC | Facility: CLINIC | Age: 32
End: 2022-09-08
Payer: COMMERCIAL

## 2022-09-08 VITALS
RESPIRATION RATE: 18 BRPM | HEIGHT: 62 IN | HEART RATE: 92 BPM | WEIGHT: 125 LBS | TEMPERATURE: 98 F | DIASTOLIC BLOOD PRESSURE: 82 MMHG | BODY MASS INDEX: 23 KG/M2 | SYSTOLIC BLOOD PRESSURE: 112 MMHG | OXYGEN SATURATION: 98 %

## 2022-09-08 DIAGNOSIS — J02.9 PHARYNGITIS, UNSPECIFIED ETIOLOGY: Primary | ICD-10-CM

## 2022-09-08 DIAGNOSIS — J02.9 SORE THROAT: ICD-10-CM

## 2022-09-08 LAB
CONTROL LINE PRESENT WITH A CLEAR BACKGROUND (YES/NO): YES YES/NO
KIT LOT #: 2554 NUMERIC
STREP GRP A CUL-SCR: NEGATIVE

## 2022-09-08 PROCEDURE — 87880 STREP A ASSAY W/OPTIC: CPT | Performed by: PHYSICIAN ASSISTANT

## 2022-09-08 PROCEDURE — 3079F DIAST BP 80-89 MM HG: CPT | Performed by: PHYSICIAN ASSISTANT

## 2022-09-08 PROCEDURE — 3008F BODY MASS INDEX DOCD: CPT | Performed by: PHYSICIAN ASSISTANT

## 2022-09-08 PROCEDURE — 87081 CULTURE SCREEN ONLY: CPT | Performed by: PHYSICIAN ASSISTANT

## 2022-09-08 PROCEDURE — 99213 OFFICE O/P EST LOW 20 MIN: CPT | Performed by: PHYSICIAN ASSISTANT

## 2022-09-08 PROCEDURE — 3074F SYST BP LT 130 MM HG: CPT | Performed by: PHYSICIAN ASSISTANT

## 2022-12-19 ENCOUNTER — OFFICE VISIT (OUTPATIENT)
Dept: FAMILY MEDICINE CLINIC | Facility: CLINIC | Age: 32
End: 2022-12-19
Payer: COMMERCIAL

## 2022-12-19 VITALS
BODY MASS INDEX: 23 KG/M2 | RESPIRATION RATE: 18 BRPM | OXYGEN SATURATION: 99 % | HEIGHT: 62 IN | DIASTOLIC BLOOD PRESSURE: 76 MMHG | WEIGHT: 125 LBS | HEART RATE: 107 BPM | TEMPERATURE: 98 F | SYSTOLIC BLOOD PRESSURE: 118 MMHG

## 2022-12-19 DIAGNOSIS — J02.9 SORE THROAT: ICD-10-CM

## 2022-12-19 DIAGNOSIS — J01.00 ACUTE NON-RECURRENT MAXILLARY SINUSITIS: Primary | ICD-10-CM

## 2022-12-19 LAB
CONTROL LINE PRESENT WITH A CLEAR BACKGROUND (YES/NO): YES YES/NO
STREP GRP A CUL-SCR: NEGATIVE

## 2022-12-19 PROCEDURE — 3008F BODY MASS INDEX DOCD: CPT | Performed by: NURSE PRACTITIONER

## 2022-12-19 PROCEDURE — 87880 STREP A ASSAY W/OPTIC: CPT | Performed by: NURSE PRACTITIONER

## 2022-12-19 PROCEDURE — 99213 OFFICE O/P EST LOW 20 MIN: CPT | Performed by: NURSE PRACTITIONER

## 2022-12-19 PROCEDURE — 3074F SYST BP LT 130 MM HG: CPT | Performed by: NURSE PRACTITIONER

## 2022-12-19 PROCEDURE — 3078F DIAST BP <80 MM HG: CPT | Performed by: NURSE PRACTITIONER

## 2022-12-19 RX ORDER — DOXYCYCLINE HYCLATE 100 MG
100 TABLET ORAL 2 TIMES DAILY
Qty: 14 TABLET | Refills: 0 | Status: SHIPPED | OUTPATIENT
Start: 2022-12-19 | End: 2022-12-26

## 2022-12-19 NOTE — PATIENT INSTRUCTIONS
1. Rest. Drink plenty of fluids. 2. Tylenol/Ibuprofen for pain/fevers. Doxycycline as prescribed. 3. Salt water gargles three times daily  4. Use humidifier at home when possible. 5. The rapid strep test was negative today. 6. Covid-19 testing declined. Notes negative home covid-19 test.    7. Follow up with PMD in 4-5 days for re-eval. Go to the emergency department immediately if symptoms worsen, change, you develop chest discomfort, wheezing, shortness of breath, or if you have any concerns.

## 2023-04-26 ENCOUNTER — TELEPHONE (OUTPATIENT)
Dept: FAMILY MEDICINE CLINIC | Facility: CLINIC | Age: 33
End: 2023-04-26

## 2023-04-30 ENCOUNTER — OFFICE VISIT (OUTPATIENT)
Dept: FAMILY MEDICINE CLINIC | Facility: CLINIC | Age: 33
End: 2023-04-30
Payer: COMMERCIAL

## 2023-04-30 VITALS
BODY MASS INDEX: 22.08 KG/M2 | WEIGHT: 120 LBS | HEART RATE: 94 BPM | OXYGEN SATURATION: 98 % | SYSTOLIC BLOOD PRESSURE: 112 MMHG | DIASTOLIC BLOOD PRESSURE: 72 MMHG | RESPIRATION RATE: 16 BRPM | TEMPERATURE: 98 F | HEIGHT: 62 IN

## 2023-04-30 DIAGNOSIS — M79.672 LEFT FOOT PAIN: Primary | ICD-10-CM

## 2023-04-30 PROCEDURE — 3078F DIAST BP <80 MM HG: CPT | Performed by: PHYSICIAN ASSISTANT

## 2023-04-30 PROCEDURE — 3008F BODY MASS INDEX DOCD: CPT | Performed by: PHYSICIAN ASSISTANT

## 2023-04-30 PROCEDURE — 99213 OFFICE O/P EST LOW 20 MIN: CPT | Performed by: PHYSICIAN ASSISTANT

## 2023-04-30 PROCEDURE — 3074F SYST BP LT 130 MM HG: CPT | Performed by: PHYSICIAN ASSISTANT

## 2023-04-30 NOTE — PATIENT INSTRUCTIONS
-Ice and elevate night  -Motrin  -REST!!- no high impact training. Limiting walking. No running for now.   -If no improvement in the next week, follow up with PCP or ortho

## 2023-05-30 ENCOUNTER — OFFICE VISIT (OUTPATIENT)
Dept: FAMILY MEDICINE CLINIC | Facility: CLINIC | Age: 33
End: 2023-05-30
Payer: COMMERCIAL

## 2023-05-30 VITALS
HEIGHT: 62 IN | WEIGHT: 122 LBS | OXYGEN SATURATION: 98 % | RESPIRATION RATE: 18 BRPM | TEMPERATURE: 98 F | SYSTOLIC BLOOD PRESSURE: 112 MMHG | HEART RATE: 91 BPM | DIASTOLIC BLOOD PRESSURE: 76 MMHG | BODY MASS INDEX: 22.45 KG/M2

## 2023-05-30 DIAGNOSIS — E78.1 HYPERTRIGLYCERIDEMIA: ICD-10-CM

## 2023-05-30 DIAGNOSIS — Z00.00 ANNUAL PHYSICAL EXAM: Primary | ICD-10-CM

## 2023-05-30 DIAGNOSIS — Z23 NEED FOR VACCINATION: ICD-10-CM

## 2023-05-30 DIAGNOSIS — R74.8 ELEVATED LIVER ENZYMES: ICD-10-CM

## 2023-05-30 LAB
ALBUMIN SERPL-MCNC: 4.1 G/DL (ref 3.4–5)
ALBUMIN/GLOB SERPL: 1.2 {RATIO} (ref 1–2)
ALP LIVER SERPL-CCNC: 78 U/L
ALT SERPL-CCNC: 40 U/L
ANION GAP SERPL CALC-SCNC: 5 MMOL/L (ref 0–18)
AST SERPL-CCNC: 27 U/L (ref 15–37)
BILIRUB SERPL-MCNC: 0.4 MG/DL (ref 0.1–2)
BUN BLD-MCNC: 10 MG/DL (ref 7–18)
CALCIUM BLD-MCNC: 9.7 MG/DL (ref 8.5–10.1)
CHLORIDE SERPL-SCNC: 105 MMOL/L (ref 98–112)
CHOLEST SERPL-MCNC: 225 MG/DL (ref ?–200)
CO2 SERPL-SCNC: 26 MMOL/L (ref 21–32)
CREAT BLD-MCNC: 1.28 MG/DL
ERYTHROCYTE [DISTWIDTH] IN BLOOD BY AUTOMATED COUNT: 12.8 %
FASTING PATIENT LIPID ANSWER: YES
FASTING STATUS PATIENT QL REPORTED: YES
GFR SERPLBLD BASED ON 1.73 SQ M-ARVRAT: 58 ML/MIN/1.73M2 (ref 60–?)
GLOBULIN PLAS-MCNC: 3.4 G/DL (ref 2.8–4.4)
GLUCOSE BLD-MCNC: 101 MG/DL (ref 70–99)
HCT VFR BLD AUTO: 43.1 %
HDLC SERPL-MCNC: 76 MG/DL (ref 40–59)
HGB BLD-MCNC: 14.4 G/DL
LDLC SERPL CALC-MCNC: 128 MG/DL (ref ?–100)
MCH RBC QN AUTO: 32.3 PG (ref 26–34)
MCHC RBC AUTO-ENTMCNC: 33.4 G/DL (ref 31–37)
MCV RBC AUTO: 96.6 FL
NONHDLC SERPL-MCNC: 149 MG/DL (ref ?–130)
OSMOLALITY SERPL CALC.SUM OF ELEC: 281 MOSM/KG (ref 275–295)
PLATELET # BLD AUTO: 241 10(3)UL (ref 150–450)
POTASSIUM SERPL-SCNC: 4.4 MMOL/L (ref 3.5–5.1)
PROT SERPL-MCNC: 7.5 G/DL (ref 6.4–8.2)
RBC # BLD AUTO: 4.46 X10(6)UL
SODIUM SERPL-SCNC: 136 MMOL/L (ref 136–145)
TRIGL SERPL-MCNC: 122 MG/DL (ref 30–149)
TSI SER-ACNC: 1.39 MIU/ML (ref 0.36–3.74)
VLDLC SERPL CALC-MCNC: 22 MG/DL (ref 0–30)
WBC # BLD AUTO: 8.2 X10(3) UL (ref 4–11)

## 2023-05-30 PROCEDURE — 84443 ASSAY THYROID STIM HORMONE: CPT | Performed by: FAMILY MEDICINE

## 2023-05-30 PROCEDURE — 90471 IMMUNIZATION ADMIN: CPT | Performed by: FAMILY MEDICINE

## 2023-05-30 PROCEDURE — 85027 COMPLETE CBC AUTOMATED: CPT | Performed by: FAMILY MEDICINE

## 2023-05-30 PROCEDURE — 90716 VAR VACCINE LIVE SUBQ: CPT | Performed by: FAMILY MEDICINE

## 2023-05-30 PROCEDURE — 3008F BODY MASS INDEX DOCD: CPT | Performed by: FAMILY MEDICINE

## 2023-05-30 PROCEDURE — 80061 LIPID PANEL: CPT | Performed by: FAMILY MEDICINE

## 2023-05-30 PROCEDURE — 80053 COMPREHEN METABOLIC PANEL: CPT | Performed by: FAMILY MEDICINE

## 2023-05-30 PROCEDURE — 99395 PREV VISIT EST AGE 18-39: CPT | Performed by: FAMILY MEDICINE

## 2023-05-30 PROCEDURE — 3074F SYST BP LT 130 MM HG: CPT | Performed by: FAMILY MEDICINE

## 2023-05-30 PROCEDURE — 3078F DIAST BP <80 MM HG: CPT | Performed by: FAMILY MEDICINE

## 2023-06-02 ENCOUNTER — TELEPHONE (OUTPATIENT)
Dept: FAMILY MEDICINE CLINIC | Facility: CLINIC | Age: 33
End: 2023-06-02

## 2023-06-02 DIAGNOSIS — E78.00 ELEVATED CHOLESTEROL: Primary | ICD-10-CM

## 2023-06-02 DIAGNOSIS — N28.9 ABNORMAL KIDNEY FUNCTION: ICD-10-CM

## 2023-06-02 NOTE — TELEPHONE ENCOUNTER
----- Message from Sariah Raymundo MD sent at 6/1/2023  6:19 PM CDT -----  Results reviewed. Please inform patient her blood count and thyroid levels are normal.  Her kidney function mild elevated may be because she was dry. I do recommend repeat a BMP in 1 month. Please do the lab hydrated.   Her cholesterol levels are elevated I do recommend low-fat diet and exercise  Repeat cholesterol levels in 6 months

## 2023-07-01 ENCOUNTER — TELEPHONE (OUTPATIENT)
Dept: FAMILY MEDICINE CLINIC | Facility: CLINIC | Age: 33
End: 2023-07-01

## 2023-07-17 ENCOUNTER — TELEPHONE (OUTPATIENT)
Dept: FAMILY MEDICINE CLINIC | Facility: CLINIC | Age: 33
End: 2023-07-17

## 2023-07-17 NOTE — TELEPHONE ENCOUNTER
Pap requested from Dr Orantes President  See Meño Day notes from 5/30/23    [facility]  [address] [place] [address]  Office: [phone]    Fax: [phone]

## 2023-08-01 ENCOUNTER — TELEPHONE (OUTPATIENT)
Dept: FAMILY MEDICINE CLINIC | Facility: CLINIC | Age: 33
End: 2023-08-01

## 2023-08-01 NOTE — TELEPHONE ENCOUNTER
Overdue labs  [facility] sent  Future Appointments   Date Time Provider Dann Beaver   9/25/2023  6:30 AM Blanca Cespedes MD [facility]

## 2023-08-23 ENCOUNTER — HOSPITAL ENCOUNTER (EMERGENCY)
Age: 33
Discharge: HOME | End: 2023-08-23
Attending: EMERGENCY MEDICINE
Payer: COMMERCIAL

## 2023-08-23 ENCOUNTER — OFFICE VISIT (OUTPATIENT)
Dept: FAMILY MEDICINE CLINIC | Facility: CLINIC | Age: 33
End: 2023-08-23
Payer: COMMERCIAL

## 2023-08-23 VITALS
DIASTOLIC BLOOD PRESSURE: 72 MMHG | BODY MASS INDEX: 22.82 KG/M2 | OXYGEN SATURATION: 100 % | SYSTOLIC BLOOD PRESSURE: 108 MMHG | RESPIRATION RATE: 16 BRPM | WEIGHT: 124 LBS | HEART RATE: 90 BPM | HEIGHT: 62 IN | TEMPERATURE: 99 F

## 2023-08-23 VITALS
OXYGEN SATURATION: 96 % | SYSTOLIC BLOOD PRESSURE: 110 MMHG | TEMPERATURE: 98 F | HEART RATE: 100 BPM | BODY MASS INDEX: 22.82 KG/M2 | WEIGHT: 124 LBS | DIASTOLIC BLOOD PRESSURE: 68 MMHG | RESPIRATION RATE: 18 BRPM | HEIGHT: 62 IN

## 2023-08-23 DIAGNOSIS — K52.9 GASTROENTERITIS: ICD-10-CM

## 2023-08-23 DIAGNOSIS — O21.9 NAUSEA AND VOMITING DURING PREGNANCY: Primary | ICD-10-CM

## 2023-08-23 DIAGNOSIS — R19.7 DIARRHEA, UNSPECIFIED TYPE: ICD-10-CM

## 2023-08-23 DIAGNOSIS — O21.0 HYPEREMESIS GRAVIDARUM: Primary | ICD-10-CM

## 2023-08-23 LAB
ALBUMIN SERPL-MCNC: 4.1 G/DL (ref 3.4–5)
ALBUMIN/GLOB SERPL: 1.2 {RATIO} (ref 1–2)
ALP LIVER SERPL-CCNC: 80 U/L
ALT SERPL-CCNC: 21 U/L
ANION GAP SERPL CALC-SCNC: 4 MMOL/L (ref 0–18)
AST SERPL-CCNC: 13 U/L (ref 15–37)
BASOPHILS # BLD AUTO: 0.05 X10(3) UL (ref 0–0.2)
BASOPHILS NFR BLD AUTO: 0.4 %
BILIRUB SERPL-MCNC: 0.4 MG/DL (ref 0.1–2)
BILIRUB UR QL STRIP.AUTO: NEGATIVE
BUN BLD-MCNC: 7 MG/DL (ref 7–18)
CALCIUM BLD-MCNC: 9.7 MG/DL (ref 8.5–10.1)
CHLORIDE SERPL-SCNC: 104 MMOL/L (ref 98–112)
CLARITY UR REFRACT.AUTO: CLEAR
CO2 SERPL-SCNC: 26 MMOL/L (ref 21–32)
COLOR UR AUTO: COLORLESS
CREAT BLD-MCNC: 0.71 MG/DL
EGFRCR SERPLBLD CKD-EPI 2021: 117 ML/MIN/1.73M2 (ref 60–?)
EOSINOPHIL # BLD AUTO: 0.04 X10(3) UL (ref 0–0.7)
EOSINOPHIL NFR BLD AUTO: 0.3 %
ERYTHROCYTE [DISTWIDTH] IN BLOOD BY AUTOMATED COUNT: 12.2 %
GLOBULIN PLAS-MCNC: 3.4 G/DL (ref 2.8–4.4)
GLUCOSE BLD-MCNC: 152 MG/DL (ref 70–99)
GLUCOSE UR STRIP.AUTO-MCNC: NEGATIVE MG/DL
HCT VFR BLD AUTO: 41.9 %
HGB BLD-MCNC: 14.3 G/DL
IMM GRANULOCYTES # BLD AUTO: 0.05 X10(3) UL (ref 0–1)
IMM GRANULOCYTES NFR BLD: 0.4 %
KETONES UR STRIP.AUTO-MCNC: NEGATIVE MG/DL
LYMPHOCYTES # BLD AUTO: 2.26 X10(3) UL (ref 1–4)
LYMPHOCYTES NFR BLD AUTO: 16.4 %
MCH RBC QN AUTO: 32.7 PG (ref 26–34)
MCHC RBC AUTO-ENTMCNC: 34.1 G/DL (ref 31–37)
MCV RBC AUTO: 95.9 FL
MONOCYTES # BLD AUTO: 0.9 X10(3) UL (ref 0.1–1)
MONOCYTES NFR BLD AUTO: 6.6 %
NEUTROPHILS # BLD AUTO: 10.44 X10 (3) UL (ref 1.5–7.7)
NEUTROPHILS # BLD AUTO: 10.44 X10(3) UL (ref 1.5–7.7)
NEUTROPHILS NFR BLD AUTO: 75.9 %
NITRITE UR QL STRIP.AUTO: NEGATIVE
OSMOLALITY SERPL CALC.SUM OF ELEC: 279 MOSM/KG (ref 275–295)
PH UR STRIP.AUTO: 6.5 [PH] (ref 5–8)
PLATELET # BLD AUTO: 227 10(3)UL (ref 150–450)
POTASSIUM SERPL-SCNC: 4.1 MMOL/L (ref 3.5–5.1)
PROT SERPL-MCNC: 7.5 G/DL (ref 6.4–8.2)
PROT UR STRIP.AUTO-MCNC: NEGATIVE MG/DL
RBC # BLD AUTO: 4.37 X10(6)UL
RBC UR QL AUTO: NEGATIVE
SODIUM SERPL-SCNC: 134 MMOL/L (ref 136–145)
SP GR UR STRIP.AUTO: 1.01 (ref 1–1.03)
UROBILINOGEN UR STRIP.AUTO-MCNC: 0.2 MG/DL
WBC # BLD AUTO: 13.7 X10(3) UL (ref 4–11)

## 2023-08-23 PROCEDURE — 80053 COMPREHEN METABOLIC PANEL: CPT | Performed by: EMERGENCY MEDICINE

## 2023-08-23 PROCEDURE — 96361 HYDRATE IV INFUSION ADD-ON: CPT

## 2023-08-23 PROCEDURE — 96374 THER/PROPH/DIAG INJ IV PUSH: CPT

## 2023-08-23 PROCEDURE — 81015 MICROSCOPIC EXAM OF URINE: CPT | Performed by: EMERGENCY MEDICINE

## 2023-08-23 PROCEDURE — 85025 COMPLETE CBC W/AUTO DIFF WBC: CPT | Performed by: EMERGENCY MEDICINE

## 2023-08-23 PROCEDURE — 81001 URINALYSIS AUTO W/SCOPE: CPT | Performed by: EMERGENCY MEDICINE

## 2023-08-23 PROCEDURE — 99284 EMERGENCY DEPT VISIT MOD MDM: CPT

## 2023-08-23 RX ORDER — ONDANSETRON 2 MG/ML
4 INJECTION INTRAMUSCULAR; INTRAVENOUS ONCE
Status: COMPLETED | OUTPATIENT
Start: 2023-08-23 | End: 2023-08-23

## 2023-08-31 ENCOUNTER — TELEPHONE (OUTPATIENT)
Dept: OBGYN CLINIC | Facility: CLINIC | Age: 33
End: 2023-08-31

## 2023-08-31 NOTE — TELEPHONE ENCOUNTER
Patient calling to transfer [facility] care gynecologist does not do OB appts  GA 8wk1d per last ultrasound GALE apprx 4/8/23  Insurance bcbs ppo   Good time to return phone call anytime

## 2023-09-01 ENCOUNTER — PATIENT MESSAGE (OUTPATIENT)
Dept: OBGYN CLINIC | Facility: CLINIC | Age: 33
End: 2023-09-01

## 2023-09-05 NOTE — TELEPHONE ENCOUNTER
Received Medical records thru fax in [place] today from Dr. sEmer Clark office. Transfer new ob appt with you on 9/27 at 1:30 pm. Records put in Luz's bin for review.

## 2023-09-05 NOTE — TELEPHONE ENCOUNTER
From: Alfredo Chaparro  To: Nolberto Prader, APRN  Sent: 9/1/2023 5:27 PM CDT  Subject: NIPT Test    Good evening [facility],  I am looking forward to meeting you at my appointment September 27th! I was wondering if during that visit we will be able to do the NIPT blood test and also find out the sex of the baby. Is this something that could be done prior to the appointment?      Thank you,  Danna Squires

## 2023-09-29 NOTE — TELEPHONE ENCOUNTER
Received orders from [place]. [facility] signed and I faxed back.  Copy in plfd

## 2023-10-12 NOTE — TELEPHONE ENCOUNTER
From: Isom Severs Derrickson  To: Bashir Nuñez  Sent: 10/11/2023 6:15 AM CDT  Subject: Insurance breast pump    Good morning [facility],  I applied for a breasts pump through insurance. They said that they need a prescription form you to complete the order. Were they able to send the information over to you?      Thank you so much,  Nicanor Barbosa

## 2023-10-26 NOTE — PROGRESS NOTES
PRISCILLA  FM- none yet  Doing well but cold symptoms/ sinus congestion   No complaints.  No LOF/VB/uctx  Genetic testing- normal [id number], AFP declined  Anatomy Scan with PRISCILLA at 20 weeks

## 2023-11-22 NOTE — PROGRESS NOTES
PRISCILLA    GA: 21w1d  Vitals:    23 0956   BP: 102/68   Pulse: 68   Weight: 129 lb (58.5 kg)       Doing well  No complaints. Denies LOF/VB/uctx  RH +  Genetic testing NIPT neg. Declined MSAFP. Anatomy Scan  unremarkable   CBC and 1 hr GTT order and instructions provided    Problem List Items Addressed This Visit          Gravid and     Supervision of normal first pregnancy, antepartum - Primary    Relevant Orders    US OB 2nd Trimester Complete >14 wks [47883] (Completed)    CBC (with DIFF, Platelet) Reflex to Ferritin    Glucose 1 HR OB         RTC in 4 wks         Note to patient and family   The [facility] makes medical notes available to patients in the interest of transparency. However, please be advised that this is a medical document. It is intended as [id number]-wa-opmj communication. It is written and medical language may contain abbreviations or verbiage that are technical and unfamiliar. It may appear blunt or direct. Medical documents are intended to carry relevant information, facts as evident, and the clinical opinion of the practitioner.

## 2023-12-11 NOTE — TELEPHONE ENCOUNTER
From: Billy Garnica  To: Sugey Lawrence  Sent: 12/10/2023 7:43 AM CST  Subject: Travel    Good morning,  My  and I are thinking of booking a trip to [facility] for a babymoon. With Zika virus, I just want to make sure this would be a safe option. We would be staying at an all inclusive resort and not leaving the resort. I would be 27 weeks when we travel. Please let me know your thoughts.       Thank you,  Scott Queen

## 2023-12-11 NOTE — TELEPHONE ENCOUNTER
[place], pt would like to know your thoughts on traveling to [id number], considering Zika risk. She is planning on a trip when she will be 27 weeks.

## 2023-12-11 NOTE — TELEPHONE ENCOUNTER
From: Camille Garnica  To: Mainor Shah  Sent: 12/10/2023 7:42 AM CST  Subject: Travel    Good morning [place],  My  and I are thinking of booking a trip to [facility] for a babymoon. With Zika virus, I just want to make sure this would be a safe option. We would be staying at an all inclusive resort and not leaving the resort. I would be 27 weeks when we travel. Please let me know your thoughts.      Thank you,  Mo Vasquez

## 2023-12-11 NOTE — PROGRESS NOTES
23w6d seen for routine OB visit. Denies ctx, lof, vb. Reports good FM. Patient Active Problem List   Diagnosis    Other allergy, other than to medicinal agents    History of cold sores    Anxiety    Hypertriglyceridemia    Supervision of normal first pregnancy, antepartum    Cervical dysplasia        TW lb (6.35 kg)   Depression Scale Total: 2 (2023  3:05 PM)      Gen: AAOx3, NAD  Resp: breathing comfortably  Abdomen: gravid, soft, nontender  Ext: nontender, no edema    Plan:  - 2T labs done today  - tdap next visit, s/p flu vaccine  - return precautions reviewed    RTO in 4 week(s).

## 2024-01-10 NOTE — PROGRESS NOTES
PRISCILLA--28w1d    Pt does note some back pain. Discussed pregnancy support belt and clothing. Also with some acid reflux. Discussed diet and liefstyle.  Denies Vb, LOF, contractions. + fetal movement.       A/P:   FHT-P  PNL: Discussed and ordered HIV and T pallidium  RH positive  Immunization: TDAP discussed and ordered today  Reviewed  labor precautions  Fetal movement expectations discussed      Return in 2 weeks

## 2024-01-11 NOTE — TELEPHONE ENCOUNTER
I would recommend seeing pediatrician I do recommend [facility] in [place].     [phone]

## 2024-01-11 NOTE — TELEPHONE ENCOUNTER
From: Judi Garnica  To: Najma Jensen  Sent: 2024 7:01 AM CST  Subject:  Care    Good morning Dr. Jensen,  Our baby girl is due  and I was advised by my OBGYN to contact you to see if you are comfortable seeing newborns or if we would have to find a pediatrician. If we need to go the pediatrician route, do you have any recommendations?    Thank you so much,  Judi & Tony Garnica

## 2024-01-17 NOTE — TELEPHONE ENCOUNTER
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 States she would like to get a note for work excusing her due to the after math of the pipe bursting. States there is mold, debris, fumes from chemicals used to clean up. States she doesn't feel its safe for the baby as she is 7months along. And also wants to know if she should be seen sooner than joanie WELLS on 01/23. Please call back pt and advise. Ty.

## 2024-01-18 NOTE — TELEPHONE ENCOUNTER
If the classroom is unsafe for the children I would imaging they would move classrooms. If the administration deem it safe for the children it should be safe for pregnancy. She can request the MSDS of the chemicals for administration to review to ensure safety for all people in the classroom.

## 2024-01-19 NOTE — TELEPHONE ENCOUNTER
Patient called back. Discussed provider message in detail. Discussed MyChart messages in detail as well. All questions answered.

## 2024-01-23 NOTE — PROGRESS NOTES
PRISCILLA  Has had nausea and vomiting for around 23 hours. She is not able to keep any food down but able to sip on fluids. She is noting lightheadedness and dizziness now. She has had some Frio Maxwell.  I advised she proceed to the ER for evaluation, L&D can come and clear her.  GOOD FM  Denies VB/LOF/uctx  RTC in 2 wks  Fetal movement instructions given

## 2024-01-27 NOTE — PROGRESS NOTES
Pt is a 33 year old female admitted to TRG2/TRG2-A.     Chief Complaint   Patient presents with    Hyperemesis Gravidarum     Pt states she has not been able to keep solid food down since Monday, but has no problem keeping down fluids. Took Diclegis but did not help. Had diarrhea x2 this evening. Positive fetal movement. Denies contractions, LOF and/or vaginal bleeding. Also c/o rash to right side of face.       Pt is  30w4d intra-uterine pregnancy.  History obtained. Oriented to room, staff, and plan of care.

## 2024-01-27 NOTE — NST
NST    1/27/2024   30w4d     Indication: has not been able to keep solid food down since Monday, but has no problem keeping down fluids. Took Diclegis but did not help. Had diarrhea x2 this evening.      bpm, mod areli, +accels, no decels  [place] rare contraction    NST reactive   Rx Zofran     Ami Ramirez MD

## 2024-01-27 NOTE — PROGRESS NOTES
CHIEF COMPLAINT:     Chief Complaint   Patient presents with    Vomiting     On and off since Monday,       Rash     Started today face        HPI:   Judi Garnica is a 33 year old female who presents with vomiting x 1 week.  Also with SOB x 1 week.  Patient is 27 weeks pregnant.  No fevers, chills, sweats, abdominal pain.  No known complications with the pregnancy.     Current Outpatient Medications   Medication Sig Dispense Refill    prenatal vitamin with DHA 27-0.8-228 MG Oral Cap Take 1 capsule by mouth daily.      sertraline 50 MG Oral Tab Take 1 tablet (50 mg total) by mouth daily.        Past Medical History:   Diagnosis Date    Acute sinusitis, unspecified     Acute tonsillitis     Acute tonsillitis 12/1/11    Anxiety     Bloating daily    Body piercing ears    Chronic tonsillitis 8/23/2015    Decorative tattoo 1    Diarrhea, unspecified     Easy bruising     Fatigue     Flatulence/gas pain/belching daily    Food intolerance     Frequent UTI     Heartburn 2019    Heavy menses     High cholesterol March 2021    History of cold sores 9/28/2017    History of mental disorder Anxitety    Hypertriglyceridemia 9/28/2017    Indigestion 2019    Irregular bowel habits     Itch of skin     Menses painful     Otalgia, unspecified     Other allergy, other than to medicinal agents 6/29/2012    Pruritus, unspecified 5/2/2017    Scoliosis (and kyphoscoliosis), idiopathic     Seasonal allergies     Skin blushing/flushing     Stool incontinence     Stress     Wears glasses     Weight gain       Past Surgical History:   Procedure Laterality Date    TONSILLECTOMY           Social History     Socioeconomic History    Marital status:    Occupational History    Occupation:    Tobacco Use    Smoking status: Never    Smokeless tobacco: Never   Vaping Use    Vaping Use: Never used   Substance and Sexual Activity    Alcohol use: Not Currently     Comment: Very rarely    Drug use: Never    Sexual  activity: Not Currently   Other Topics Concern     Service No    Blood Transfusions No    Caffeine Concern Yes     Comment: 1 cup coffee daily    Sleep Concern No    Stress Concern No    Weight Concern No    Special Diet No    Exercise Yes     Comment: 3 x a week     Seat Belt Yes    Self-Exams Yes     Social Determinants of Health     Financial Resource Strain: Low Risk  (9/23/2023)    Financial Resource Strain     Difficulty of Paying Living Expenses: Not very hard     Med Affordability: No   Food Insecurity: No Food Insecurity (9/23/2023)    Food Insecurity     Food Insecurity: Never true   Transportation Needs: No Transportation Needs (9/23/2023)    Transportation Needs     Lack of Transportation: No   Stress: No Stress Concern Present (9/23/2023)    Stress     Feeling of Stress : No   Housing Stability: Low Risk  (9/23/2023)    Housing Stability     Housing Instability: No         REVIEW OF SYSTEMS:   GENERAL: Normal appetite  SKIN: no rashes or abnormal skin lesions  HEENT: See HPI  LUNGS: denies shortness of breath or wheezing, See HPI  CARDIOVASCULAR: denies chest pain or palpitations   GI: denies N/V/C or abdominal pain  NEURO: Denies headaches    EXAM:   /76   Pulse 99   Temp 97.8 °F (36.6 °C)   Resp 18   Ht 5' 2\" (1.575 m)   Wt 140 lb (63.5 kg)   LMP 06/27/2023   SpO2 99%   BMI 25.61 kg/m²   GENERAL: well developed, well nourished,in no apparent distress  SKIN: no rashes,no suspicious lesions  EYES: conjunctiva clear, EOM intact  LUNGS: clear to auscultation bilaterally, no wheezes or rhonchi. Breathing is non labored.  CARDIO: RRR without murmur  GI: active BS's x4,no masses, hepatosplenomegaly. +slight TTP to lower abdomen  EXTREMITIES: no cyanosis, clubbing or edema    No results found for this or any previous visit (from the past 24 hour(s)).    ASSESSMENT AND PLAN:   Judi Garnica is a 33 year old female who presents with symptoms that are consistent  with    ASSESSMENT:   Encounter Diagnosis   Name Primary?    Vomiting, unspecified vomiting type, unspecified whether nausea present Yes       PLAN: Meds as below.  See patient Instructions.  See attached patient references.     Meds & Refills for this Visit:  Requested Prescriptions      No prescriptions requested or ordered in this encounter       Risks, benefits, and side effects of medication explained and discussed.      There are no Patient Instructions on file for this visit.    The patient/parent indicates understanding of these issues and agrees to the plan.

## 2024-01-27 NOTE — PROGRESS NOTES
Prescription for Zofran sent to patient pharmacy. Written and verbal instructions given. Pt demonstrates understanding. Ambulatory off department in stable condition. Informed to follow-up with physician as scheduled. Denies questions or concerns.

## 2024-01-27 NOTE — DISCHARGE INSTRUCTIONS
Discharge Instructions    Diet: Regular  Activity: Normal activity         General Instructions    Call your OB doctor if: Fluid leaking from your vagina;Decrease in fetal movement;Vaginal or rectal pressure;Uterine contractions 10 minutes or closer for 1 to 2 hours;Vaginal bleeding;Uterine contractions increasing in intensity and frequency;Temperature greater than 100F  Early labor comfort measures: Relax, sleep, take a warm bath or shower for 30 minutes or less;Drink fluids and eat small light meals;Take a walk

## 2024-01-27 NOTE — NST
Nonstress Test   Patient: Judi Garnica    Gestation: 30w4d    NST:       Variability: Moderate           Accelerations: Yes           Decelerations: None            Baseline: 140 BPM           Uterine Irritability: No           Contractions: Not present                                        Acoustic Stimulator: No           Nonstress Test Interpretation: Appropriate for gestational age           Nonstress Test Second Interpretation: Appropriate for gestational age                     Additional Comments

## 2024-02-05 NOTE — TELEPHONE ENCOUNTER
From: Judi Garnica  To: Margaret Garcia  Sent: 1/31/2024 12:20 PM CST  Subject: Vomiting     Juan Miguel GloriaMargaret,   After our appointment on Tuesday, you had suggested an ER visit if the vomiting had continued. I ended up being okay the rest of the night and into the next day, but since Thursday (1/25) the vomiting has continued and I am still feeling nauseous. I did end up going to quick care on Saturday (1/27) as it was not improving and they sent me to the ER where I was monitored in labor and delivery. Everything looked great with baby and I am able to keep down fluids, so they were unsure why I was still vomiting. I was given Zofran to help with nausea and have been taking a Pepcid a day to help with acid reflux. Unfortunately, I am still violently vomiting 1-2 times a day. Is this normal? Is there any blood work that needs to be done to rule anything out? I am drinking fluids and eating small frequent meals but nothing seems to be helping. Thank you, Judi Garnica

## 2024-02-05 NOTE — TELEPHONE ENCOUNTER
ALIRIO.....Pt. has appointment. With you today 430 pm              .Pt. did end up calling  back this am and said she has been vomiting for the last 15 days.. Went to ER/L/D  back on 1/27/24 monitored and given Zofran.10:36 AM taking a Pepcid daily to help with acid reflux  She said Sat. She threw up 4x and what she referred to as 'whole body throwing up\"  also threw up yesterday..She's drinking her fluids pretty much, water and Body [place] and keeping that down..but with food it is a hit or miss has been eating apple slices and toast and trying chicken tenders, hit or miss...has an appointment. With Dr. Qureshi this afternoon

## 2024-02-05 NOTE — PROGRESS NOTES
Chief Complaint   Patient presents with    Prenatal Care     [place]     Routine prenatal visit complaining of recurrent N, V.  Had bad HEG 1st trimester.  No fever, chills, diarrhea, abdominal pain.  Had OB triage visit 24.  Taking oral Zofran and Pepcid with only minimal relief.  Patient denies any bleeding, leaking fluid, cramping, or regular uterine contractions. Good fetal movement.  Assessment/Plan:  31w6d doing well  Borderline S<D and poor weight gain past month due to N,V- check growth.  Recurrent N, V in pregnancy- will do trial of Reglan.  Discussed risk of TD.  Will place referral to GI.  If good relief with Reglan, than will cancel appt.  Kick counts reminded  Reviewed  labor signs and symptoms.  Reviewed vaccine recommendations:  Tdap done.  Diagnoses and all orders for this visit:    Prenatal care, antepartum    Nausea and vomiting during pregnancy  -     metoclopramide (REGLAN) 10 MG Oral Tab; Take 1 tablet (10 mg total) by mouth every 8 (eight) hours as needed.  -     GASTRO - INTERNAL    Small for gestational age       Return in about 2 weeks (around 2024) for Routine Prenatal Visit and ultrasound.

## 2024-02-05 NOTE — TELEPHONE ENCOUNTER
Called and left a vm for pt. to call office with update on how she is feeling and if  with any concerns..Looks like she has an appointment. Today 2024 with Dr. Peterson for PRISCILLA..      GA 31 weeks and 6 days patient complaining of vomiting  Last OV: 2024 with Millie

## 2024-02-07 NOTE — TELEPHONE ENCOUNTER
From: Judi Garnica  To: Margaret Garcia  Sent: 2/3/2024 10:24 AM CST  Subject: Vomiting    Juan Miguel Robles,  I wanted to follow up from my message on Tuesday. I am now going on day 13 of violently vomiting at least once a day. The doctor in labor and delivery prescribed Zofran and it is not helping with the nausea. Can you please advise what other steps I should be taking or if we should be getting blood work done to rule out a possible cause? I tried calling the office but they are closed and I was unable to leave a message.    Thank you.  Judi

## 2024-02-09 NOTE — ED NOTES
Patient sent to the immediate care is 32 weeks pregnant has had some abdominal cramping vomiting that has been ongoing recently denies history of any vaginal bleeding unable to keep any fluids down has been taking Zofran and Reglan without relief will be sent to the hospital for further evaluation and and monitoring at this time.

## 2024-02-09 NOTE — NST
Nonstress Test   Patient: Judi Garnica    Gestation: 32w3d    NST:       Variability: Moderate           Accelerations: Yes           Decelerations: None            Baseline: 135 BPM           Uterine Irritability: Yes           Contractions: Not present                                        Acoustic Stimulator: No           Nonstress Test Interpretation: Reactive                                 Additional Comments

## 2024-02-09 NOTE — ED INITIAL ASSESSMENT (HPI)
Patient is 32 weeks and 3 days pregnant and states she has been having nausea and vomiting for approximately 3 weeks. Has been treated throughout that time. Has been unable to keep liquids down. Is taking Zofran and Reglan with no relief.

## 2024-02-09 NOTE — PROGRESS NOTES
Pt is a 33 year old female admitted to TRG3/TRG3-A.     Chief Complaint   Patient presents with    Nausea/vomiting      Pt is  32w3d intra-uterine pregnancy.  History obtained, consents signed. Oriented to room, staff, and plan of care.  Pt say she is having Nausea and vomiting for the last 21 days and Zofran, reglan and pepcid dose is not helping her  Orders received from  for labs, IVF and Medication , please see MAR for orders.

## 2024-02-09 NOTE — ED PROVIDER NOTES
Patient Seen in: [facility]      History     Chief Complaint   Patient presents with    Nausea/vomiting    Pregnancy     Stated Complaint: vomiting 20 days per obgyn come here    Subjective:   HPI    32 YO female, currently 32 weeks pregnant, presents to immediate care for evaluation of daily emesis for 20 days. Patient states she cannot keep anything down.  She reports emesis of food contents and fluids. She also reports generalized abdominal tightness the past 20 days that has worsened the past 2 days.  Denies vaginal bleeding.  She was recently admitted for for hyperemesis gravidarum on 1/27/24.       Objective:   Past Medical History:   Diagnosis Date    Acute sinusitis, unspecified     Acute tonsillitis     Acute tonsillitis 12/1/11    Anxiety     Bloating daily    Body piercing ears    Chronic tonsillitis 8/23/2015    Decorative tattoo 1    Diarrhea, unspecified     Easy bruising     Fatigue     Flatulence/gas pain/belching daily    Food intolerance     Frequent UTI     Heartburn 2019    Heavy menses     High cholesterol March 2021    History of cold sores 9/28/2017    History of mental disorder Anxitety    Hypertriglyceridemia 9/28/2017    Indigestion 2019    Irregular bowel habits     Itch of skin     Menses painful     Otalgia, unspecified     Other allergy, other than to medicinal agents 6/29/2012    Pruritus, unspecified 5/2/2017    Scoliosis (and kyphoscoliosis), idiopathic     Seasonal allergies     Skin blushing/flushing     Stool incontinence     Stress     Wears glasses     Weight gain               Past Surgical History:   Procedure Laterality Date    COLPOSCOPY, CERVIX W/UPPER ADJACENT VAGINA; W/BIOPSY(S), CERVIX Bilateral 01/01/2020    TONSILLECTOMY                  Social History     Socioeconomic History    Marital status:    Occupational History    Occupation:    Tobacco Use    Smoking status: Never    Smokeless tobacco: Never   Vaping Use    Vaping Use:  Advised patient that a 24 hour call to cancel appointment is necessary. Never used   Substance and Sexual Activity    Alcohol use: Not Currently     Comment: Very rarely    Drug use: Never    Sexual activity: Not Currently   Other Topics Concern     Service No    Blood Transfusions No    Caffeine Concern Yes     Comment: 1 cup coffee daily    Sleep Concern No    Stress Concern No    Weight Concern No    Special Diet No    Exercise Yes     Comment: 3 x a week     Seat Belt Yes    Self-Exams Yes     Social Determinants of Health     Financial Resource Strain: Low Risk  (1/27/2024)    Financial Resource Strain     Difficulty of Paying Living Expenses: Not hard at all     Med Affordability: No   Food Insecurity: No Food Insecurity (1/27/2024)    Food Insecurity     Food Insecurity: Never true   Transportation Needs: No Transportation Needs (1/27/2024)    Transportation Needs     Lack of Transportation: No   Stress: No Stress Concern Present (1/27/2024)    Stress     Feeling of Stress : No   Housing Stability: Low Risk  (1/27/2024)    Housing Stability     Housing Instability: No              Review of Systems    Positive for stated complaint: vomiting 20 days per obgyn come here  Other systems are as noted in HPI.  Constitutional and vital signs reviewed.      All other systems reviewed and negative except as noted above.    Physical Exam     ED Triage Vitals [02/09/24 1308]   BP (!) 124/92   Pulse 103   Resp 18   Temp 98.5 °F (36.9 °C)   Temp src Temporal   SpO2 99 %   O2 Device None (Room air)       Current:/80   Pulse 103   Temp 98.5 °F (36.9 °C) (Temporal)   Resp 18   LMP 06/27/2023   SpO2 99%         Physical Exam  Vitals and nursing note reviewed.   Constitutional:       General: She is not in acute distress.     Appearance: Normal appearance. She is not ill-appearing, toxic-appearing or diaphoretic.   Cardiovascular:      Heart sounds: Normal heart sounds.   Pulmonary:      Effort: Pulmonary effort is normal. No respiratory distress.   Abdominal:      Tenderness:  There is no abdominal tenderness. There is no guarding.   Neurological:      Mental Status: She is alert and oriented to person, place, and time.   Psychiatric:         Mood and Affect: Mood normal.         Behavior: Behavior normal.         ED Course   Labs Reviewed - No data to display      MDM      This is a 32 YO female, currently 32 weeks pregnant, that presents to immediate care for evaluation of daily emesis for 20 days.  Patient states she cannot keep anything down.  She reports emesis of food contents and fluids. She also reports abdominal tightness the past 20 days that has worsened in the past 2 days.  Denies vaginal bleeding.  She was recently admitted for for hyperemesis gravidarum on 1/27/24.     Due to above-mentioned third trimester symptoms, management is appropriate at the ER.  Patient verbalized understanding and is in agreement.    I discussed this case with my supervising physician Dr. Wagoner who is in agreement with this plan.        Medical Decision Making      Disposition and Plan     Clinical Impression:  1. Nausea and vomiting in pregnancy    2. Abdominal pain during pregnancy in third trimester         Disposition:  Ic to ed  2/9/2024  1:33 pm    Follow-up:  No follow-up provider specified.        Medications Prescribed:  Discharge Medication List as of 2/9/2024  1:33 PM

## 2024-02-09 NOTE — TELEPHONE ENCOUNTER
Pt called re: her mychart message.. pt having some vomiting issues.    Future Appointments   Date Time Provider Department Center   2/19/2024 10:30 AM [facility] [address]   2/19/2024  2:00 PM Teresa Means MD [facility] [address]   3/4/2024  4:45 PM Finn Kelley MD [facility] [address]   3/12/2024  3:45 PM Lizeth Voss MD [facility] [address]   3/19/2024  3:45 PM Finn Kelley MD [facility] [address]   3/27/2024 12:45 PM Lizeth Voss MD [facility] [address]

## 2024-02-09 NOTE — DISCHARGE INSTRUCTIONS
Discharge Instructions    Diet: diet as tolerated            General Instructions    Call your OB doctor if: Fluid leaking from your vagina;Uterine contractions increasing in intensity and frequency;Vaginal or rectal pressure;Vaginal bleeding;Uterine contractions 10 minutes or closer for 1 to 2 hours;Decrease in fetal movement;Temperature greater than 100F  Early labor comfort measures: Drink fluids and eat small light meals

## 2024-02-09 NOTE — TELEPHONE ENCOUNTER
OB patient calling with c/o worsening vomiting and nausea. Was seen in the office on 02/05/2024 and is currently taking Zofran and Reglan.     Reports she is on day 20 of vomiting and that in the last 12 hours she has vomited 5 times. States she can't keep fluids down even on Zofran and Reglan. Reports feeling light headed, dizzy, and abdominal tightening.    Patient advised to go to urgent care for labs and IV fluids. GI can't get her in any time soon. Precautions provided.

## 2024-02-09 NOTE — PROGRESS NOTES
Updated  on pt tolerated to PO fluids, crackers and vital signs and Labs WNL  Orders received to discharge pt home with increase diet as tolerated and pt told not to continue reglan and continue Zofran and Phenergan rectal as prescribed , pt verbalizes understanding, pt going home in stable condition accompanied with spouse, pt not in active labor on discharge.

## 2024-02-09 NOTE — PROGRESS NOTES
Hyperemesis     Recommend to discontinue Reglan. Continue with Zofran. Add phenergan rectal suppository QID PRN.     NST Reactive  moderate variability, baseline 135, + accels, no decels  rare contractions      Discharge to home.     Teresa Means MD   [facility] - OBGYN

## 2024-02-12 NOTE — TELEPHONE ENCOUNTER
Last OV: 2/5/2024  Last refill date:   Medication Quantity Refills Start End   ondansetron (ZOFRAN) 4 mg tablet 30 tablet 0 1/27/2024 --   Sig:   Take 1 tablet (4 mg total) by mouth every 8 (eight) hours as needed for Nausea.       Follow-up: RTC in 2 weeks  Next appt.: 2/19/2024  Patient is currently 32w6d    Routed to Dr. Means- please advise on refill request

## 2024-02-19 NOTE — PROGRESS NOTES
PRISCILLA    GA: 33w6d  Vitals:    24 1055   BP: 126/80   Pulse: 103   Weight: 150 lb (68 kg)   Height: 62\"       Doing well, +FM  Denies LOF/VB/uctx  Rh +, TDAP received, EPDS 2  PTL and Fetal movement instructions given      Assessment:     Judi Garnica is a 33 year old  at 33w6d who presents for routine OB visit. Overall doing well.     Problem List Items Addressed This Visit          Gravid and     Supervision of normal first pregnancy, antepartum - Primary    Small for gestational age    Relevant Orders    US OB Follow up for Specific Condition (per fetus) [19798] (Completed)           Plan:     Patient Active Problem List    Diagnosis    Hyperemesis affecting pregnancy, antepartum    Small for gestational age     30 weeks.  Borderline, with poor weight gain due to recurrent N,V.    [ x ] Check growth - 39% EFW       Nausea and vomiting during pregnancy     Recurrent 30 wks, severe.  Had HEG first trimester.  Zofran, Pepcid, OB triage  Trial of Reglan.  If no relief, referral GI      Supervision of normal first pregnancy, antepartum    Cervical dysplasia    History of cold sores    Anxiety    Hypertriglyceridemia    Other allergy, other than to medicinal agents       - continue routine prenatal care   - labor and rupture of membrane precautions provided    Return to clinic in 2 weeks for PRISCILLA visit         Note to patient and family   The 21st Century Cures Act makes medical notes available to patients in the interest of transparency.  However, please be advised that this is a medical document.  It is intended as [id number] communication.  It is written and medical language may contain abbreviations or verbiage that are technical and unfamiliar.  It may appear blunt or direct.  Medical documents are intended to carry relevant information, facts as evident, and the clinical opinion of the practitioner.

## 2024-02-19 NOTE — PATIENT INSTRUCTIONS
Labor Instructions    How do I know if it’s true labor?  One of the most important aspects of any pregnancy is being able to recognize the onset of labor.  Unfortunately, on occasion it can be difficult or confusing, especially if you have had one or more children.  Each labor can begin in a different way even if you have had four or five children.    If this is your first child, it is very common to have labor for an average greater than 10 hours; however, there have been rare instances for labor to be two hours or less for a first time mother. It is more important for you to know if this is your second or third baby to realize that any labor after the first is usually shorter.  There is no way to tell how long or short the labor will be. Therefore, please call us if you are unsure labor has started.       Usually, during the last six weeks of pregnancy, Rayo-Maxwell contractions or “false labor pains occur”.  False labor is generally not very painful though it is not always easy to tell.  You may feel contractions, cramps or uterine tightening somewhere between every 3-30 minutes but they will not continue to get stronger over time.  If you lie down, drink plenty of fluid or walk around, the contractions may go away.   False contractions are very common if you have been active on your feet for several hours.   Women frequently worry about being sent home from the hospital without having their baby (i.e. the labor stopped).  Actually, this is an unnecessary worry, for this is an infrequent occurrence.     True labor usually begins in one of two ways.  In most patients it begins with contractions of the uterus, which are irregular (but not always) in the beginning.  They are cramp-like in character and feel similar to menstrual cramps.  After a while, they become more regular, and they seem to last a little longer, and feel a little sharper.  These symptoms are very important-more important- than the timing of the  contractions.  Having regular (usually closer), longer lasting (35-70 seconds), and sharper (more painful) contractions are the common symptoms of actual labor.  The second way in which labor can begin which occurs in approximately 30% of all patients is the rupture of the bag of water.  This is a sudden gush of watery fluid, usually sufficient to run down your leg and onto the floor, or you may wet a large area of the bed if it happens at night.  There may also be tricking that is uncontrolled.  If you are unsure, please call the office.      When should I call?  When contractions are strong and every 3-5 minutes.  If you have a gush of water or you think you might be leaking fluid.  If you are bleeding heavily.  If your baby is not moving around at least every 1-2 hours.  If you are worried about something.  When you think you are ready to go to the hospital.    Who do I call?  Call the office at [phone].  If the office is closed, the answering service will send a message to the physician on call.  The on call physician will be available for emergency phone calls only.          Can I eat in labor?  It is good to eat a light meal at home before going to the hospital.  Eat foods like crackers, popsicles, soup and fruit.  Avoid foods that are difficult to digest like meat, a lot of dairy products and high fat foods.  DO NOT EAT if you know you are scheduled for a  ().      What will happen when I get to the hospital?  When you arrive at the hospital, you will be admitted and examined.   There are a few factors that will determine if you will be allowed to be up out of bed or if you would need to stay in bed.  The main factors are how well the baby is entering into the pelvis and if the bag of mae is in intact or ruptured.  An intravenous (IV) solution will, with exceptions, be started.  This is extremely important especially for the baby.   Your  will be allowed in the room during your  labor. During the delivery, the nurses will inform you of the hospital policy and how many coaches are allowed.  You may desire pain medication or anesthesia for pain.  You probably discussed some aspects of pain medication with us during your prenatal care.  The various options may also have been discussed in Prenatal Classes.  We utilize IV narcotics and epidural anesthesia when our patients request to have them.  If you chose to have no anesthesia, none will be administered.  A local anesthetic may be used at the time of delivery      What should I to bring to the hospital?  Maternity clothes to go home in  You can bring your own night gown to wear after giving birth, but most women prefer to wear the hospital gown because it may get soiled  Nursing Bra if you are planning to breastfeed  Clothes for your baby to go home in  Baby Car Seat.  Be sure you know how to install it correctly. Please install it before going to the hospital  Routine toiletries like toothbrush, shampoo, hairbrush and etc.   You can bring your favorite pillow, but please put a colored pillow case on it so it doesn’t get mixed up with hospital pillows    How long will I stay in the hospital?  The date you leave the hospital may vary depending on the speed of your recovery.  If you have a vaginal delivery, you will stay in the hospital 24-48 hours after your delivery as long as you aren’t having any complications.    If you have had a , you will stay in the hospital 48-72 hours as long as you aren’t having any complications.       Going Home Instructions  There are no set rules as to what you may do each day or week after you are home.  You will receive discharge instructions to help you each day.  Remember, early ambulation in the hospital is to prevent complications.  Do not let this lull you into a false sense of strength or ability to do certain physical acts which may tire you excessively.  Please call the office within a few days  after you are discharged from the hospital to schedule your post-partum visit, which is usually 4-6 weeks after delivery.    Any medications necessary will be discussed on an individual basis.  If you decide to breastfeed your baby, you should continue your prenatal vitamins.  If you do not breastfeed, simply finish the prenatal vitamins you have.      The staff at [facility] wish you a joyous and exciting birth.  If there is anything we can do to make this a better experience for you please do not hesitate to ask.

## 2024-02-22 NOTE — TELEPHONE ENCOUNTER
Hx:  GA: 34w2d    Patient calling and reports that she started vomiting again since Tuesday. Reports she vomited twice on Tuesday, and twice again on Wednesday but only after eating. States she hasn't been able to keep anything down today including food/water. Denies any HA, elevated heart rate, or any other symptoms.     Is still and has been taking Zofran, Pepcid, and  Promethazine.     Per last OV it was recommended that she see GI. Patient states she has not scheduled anything because they are booked for months. Advised to contact her insurance and verify who is in her network.

## 2024-02-22 NOTE — TELEPHONE ENCOUNTER
Called GI and they are able to see the patient this Monday 02/26/2024 at 2pm for a consultation.

## 2024-02-23 NOTE — TELEPHONE ENCOUNTER
Agree with GI evaluation. Please provide precautions for patient. If unable to tolerate any PO then patient needs to report to ED.

## 2024-02-23 NOTE — TELEPHONE ENCOUNTER
Form signed and faxed to cancer center.   Spoke to the [facility] center and the earliest they see patients is 8am and the latest is 3pm. 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     Called patient; no answer. Left a detailed message. Patient to call back and notify us if there is any paperwork needed.

## 2024-02-23 NOTE — TELEPHONE ENCOUNTER
Spoke to patient and states she did not vomit yesterday afternoon/evening. Discussed precautions in detail. Patient is aware that if her symptoms are not getting better with meds she will need to present to the ED for fluids.     Patient requesting to receive weekly IV fluids if possible and maybe IV medication.     Next PRISCILLA is on 03/04/24 with Dr. Kelley. IV fluid form filled out and faxed to [facility] for review.

## 2024-02-23 NOTE — TELEPHONE ENCOUNTER
OB office calling about earliest and latest times available for patient to receive IVF. 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  Orders received via fax. PSR to reach out to patient to schedule.

## 2024-02-23 NOTE — TELEPHONE ENCOUNTER
Patient called back. Update given.   Patient requesting a letter that she can submit to her employer.   Letter created and sent via [employer].     Patient to notify us if her [facility] paperwork needs to be updated.   No further questions.

## 2024-02-26 NOTE — TELEPHONE ENCOUNTER
From: Judi Garnica  To: Teresa Means  Sent: 2/22/2024 8:15 AM CST  Subject: Vomiting     Good morning Dr. Means,  Since my ER visit on 2/9 I have been able to keep food and liquids down. However, on Tuesday 2/20 I started vomiting again. Tuesday and Wednesday it was just food that I was throwing up (twice on Tuesday and twice yesterday), but this morning I have been throwing up water and the liquid protein shake I tried for breakfast. Do I continue to wait it out and see if it gets better? I have lost a little over 2lbs since our appointment on Monday 2/19. Thank you, Judi

## 2024-02-26 NOTE — PROGRESS NOTES
Pt here for IVF . Pt denies any new issues or concerns. Most days, vomiting 4-5 times. Taking PRN anti-emetics at home. Denies nausea at this visit.     Ordering MD: Dr. Jessy WADE  Order Exp: at delivery.     Pt tolerated infusion without difficulty or complaint. Reviewed next apt date/time: PRN      Education Record  Learner:  Patient  Disease / Diagnosis: nausea secondary to pregnancy  Barriers / Limitations:  None  Method:  Discussion and Reinforcement  General Topics:  Plan of care reviewed and Fall risk and prevention  Outcome:  Shows understanding    Reinforced with patient to call PRN for IVF. Declined anti-emetics this visit but aware ordered PRN.  Pt departed stable and verbalized understanding of POC.

## 2024-03-04 NOTE — PATIENT INSTRUCTIONS
Labor Instructions    How do I know if it’s true labor?  One of the most important aspects of any pregnancy is being able to recognize the onset of labor.  Unfortunately, on occasion it can be difficult or confusing, especially if you have had one or more children.  Each labor can begin in a different way even if you have had four or five children.    If this is your first child, it is very common to have labor for an average greater than 10 hours; however, there have been rare instances for labor to be two hours or less for a first time mother. It is more important for you to know if this is your second or third baby to realize that any labor after the first is usually shorter.  There is no way to tell how long or short the labor will be. Therefore, please call us if you are unsure labor has started.       Usually, during the last six weeks of pregnancy, Rayo-Maxwell contractions or “false labor pains occur”.  False labor is generally not very painful though it is not always easy to tell.  You may feel contractions, cramps or uterine tightening somewhere between every 3-30 minutes but they will not continue to get stronger over time.  If you lie down, drink plenty of fluid or walk around, the contractions may go away.   False contractions are very common if you have been active on your feet for several hours.   Women frequently worry about being sent home from the hospital without having their baby (i.e. the labor stopped).  Actually, this is an unnecessary worry, for this is an infrequent occurrence.     True labor usually begins in one of two ways.  In most patients it begins with contractions of the uterus, which are irregular (but not always) in the beginning.  They are cramp-like in character and feel similar to menstrual cramps.  After a while, they become more regular, and they seem to last a little longer, and feel a little sharper.  These symptoms are very important-more important- than the timing of the  contractions.  Having regular (usually closer), longer lasting (35-70 seconds), and sharper (more painful) contractions are the common symptoms of actual labor.  The second way in which labor can begin which occurs in approximately 30% of all patients is the rupture of the bag of water.  This is a sudden gush of watery fluid, usually sufficient to run down your leg and onto the floor, or you may wet a large area of the bed if it happens at night.  There may also be tricking that is uncontrolled.  If you are unsure, please call the office.      When should I head to the hospital?  When contractions are strong and every 5 minutes for one hour.  If you have a gush of water or you think you might be leaking fluid.  If you are bleeding heavily.  If your baby is not moving around at least every 1-2 hours.  If you are worried about something.  When you think you are ready to go to the hospital.    Who do I call with concerns?  Call the office at [phone].  If the office is closed, the answering service will send a message to the physician on call.  The on call physician will be available for emergency phone calls only.          Can I eat in labor?  It is good to eat a light meal at home before going to the hospital.  Eat foods like crackers, popsicles, soup and fruit.  Avoid foods that are difficult to digest like meat, a lot of dairy products and high fat foods.  DO NOT EAT if you know you are scheduled for a  ().      What will happen when I get to the hospital?  You are going to [facility] in [facility].  After 8 pm, you will need to go through the Emergency Department.  When you arrive at the hospital, you will be admitted and examined.   There are a few factors that will determine if you will be allowed to be up out of bed or if you would need to stay in bed.  The main factors are how well the baby is entering into the pelvis and if the bag of mae is in intact or ruptured.   An intravenous (IV) solution will, with exceptions, be started.  This is extremely important especially for the baby.   Your  will be allowed in the room during your labor. During the delivery, the nurses will inform you of the hospital policy and how many coaches are allowed.  You may desire pain medication or anesthesia for pain.  You probably discussed some aspects of pain medication with us during your prenatal care.  The various options may also have been discussed in Prenatal Classes.  We utilize IV narcotics and epidural anesthesia when our patients request to have them.  If you chose to have no anesthesia, none will be administered.  A local anesthetic may be used at the time of delivery      What should I to bring to the hospital?  Maternity clothes to go home in  You can bring your own night gown to wear after giving birth, but most women prefer to wear the hospital gown because it may get soiled  Nursing Bra if you are planning to breastfeed  Clothes for your baby to go home in  Baby Car Seat.  Be sure you know how to install it correctly. Please install it before going to the hospital  Routine toiletries like toothbrush, shampoo, hairbrush and etc.   You can bring your favorite pillow, but please put a colored pillow case on it so it doesn’t get mixed up with hospital pillows    How long will I stay in the hospital?  The date you leave the hospital may vary depending on the speed of your recovery.  If you have a vaginal delivery, you will stay in the hospital 24-48 hours after your delivery as long as you aren’t having any complications.    If you have had a , you will stay in the hospital 48-72 hours as long as you aren’t having any complications.       Going Home Instructions  There are no set rules as to what you may do each day or week after you are home.  You will receive discharge instructions to help you each day.  Remember, early ambulation in the hospital is to prevent  complications.  Do not let this lull you into a false sense of strength or ability to do certain physical acts which may tire you excessively.  Please call the office within a few days after you are discharged from the hospital to schedule your post-partum visit, which is usually 4-6 weeks after delivery.    Any medications necessary will be discussed on an individual basis.  If you decide to breastfeed your baby, you should continue your prenatal vitamins.  If you do not breastfeed, simply finish the prenatal vitamins you have.      The staff at [facility] wish you a joyous and exciting birth.  If there is anything we can do to make this a better experience for you please do not hesitate to ask.        KICK COUNT INSTRUCTIONS    After 28 weeks of pregnancy, we would like for you to monitor your baby’s movement daily by doing kick counts, an easy way for you to assess your baby’s well-being.    How to count kicks:  Choose a time when the baby is active, such as after a meal.  Sit comfortably or lie on your side, and count the number of movements in an hour… you should feel 10 movements in 2 hours    The evening hours seem to be the most convenient time to do this test, although you can also do this test anytime you are concerned about the baby’s movement.    Call the office right away at [phone] if you notice any of the following:  Your baby moves less than 10 times in 2 hours while you are doing kick counts.  Your baby moves much less often than on the days before.  You have not felt your baby move all day.

## 2024-03-04 NOTE — PROGRESS NOTES
RETURN OB 35-41 WGA      GA: 35w6d  Vitals:    24 1655   BP: 120/70   Pulse: 99   Weight: 149 lb 8 oz (67.8 kg)   Height: 62\"       Doing well, +FM  Denies LOF/VB/uctx  Mode of delivery:   anticipated  SVE /-3   GBS collected  Fetal movement count given  Labor precautions provided   Cephalic on exam    Patient Active Problem List    Diagnosis    Vomiting of pregnancy (HCC)    Hyperemesis affecting pregnancy, antepartum (HCC)    Small for gestational age (HCC)     30 weeks.  Borderline, with poor weight gain due to recurrent N,V.    [ x ] Check growth - 39% EFW       Nausea and vomiting during pregnancy (HCC)     Recurrent 30 wks, severe.  Had HEG first trimester.  Zofran, Pepcid, OB triage  Trial of Reglan.  If no relief, referral GI-->receiving IVF      Supervision of normal first pregnancy, antepartum (HCC)    Cervical dysplasia    History of cold sores    Anxiety    Hypertriglyceridemia    Other allergy, other than to medicinal agents           RTC 1 week      Note to patient and family   The  [employer] Cures Act makes medical notes available to patients in the interest of transparency.  However, please be advised that this is a medical document.  It is intended as [id number] communication.  It is written and medical language may contain abbreviations or verbiage that are technical and unfamiliar.  It may appear blunt or direct.  Medical documents are intended to carry relevant information, facts as evident, and the clinical opinion of the practitioner.    Finn Kelley MD

## 2024-03-05 NOTE — TELEPHONE ENCOUNTER
----- Message from Finn Kelley MD sent at 3/4/2024  5:15 PM CST -----  Request IOL at/between this GA: 40+ wks  Estimated Date of Delivery: 4/2/24  Indication for IOL:  elective   Time of appointment: PM  Cervical ripening with cytotec: yes  IOL with pitocin: yes, per protocol   OB history/complications:

## 2024-03-07 NOTE — PROGRESS NOTES
Pt here for IVF/Zofran. Pt denies any issues or concerns.      Ordering MD: Allie  Order Exp: 4/3/2024     Pt tolerated infusion without difficulty or complaint. Reviewed next apt date/time: n/a - PRN order, pt will call if needed      Education Record  Learner:  Patient  Disease / Diagnosis: Hyperemesis  Barriers / Limitations:  None  Method:  Discussion  General Topics:  Plan of care reviewed  Outcome:  Shows understanding

## 2024-03-12 NOTE — PROGRESS NOTES
37w0d seen for routine OB visit.   Denies ctx, lof, vb. Reports good FM.  Denies headache or vision change today, but did say she saw some spots over the weekend. /91 at infusion center today, normotensive on recheck here.    Patient Active Problem List   Diagnosis    Other allergy, other than to medicinal agents    History of cold sores    Anxiety    Hypertriglyceridemia    Supervision of normal first pregnancy, antepartum (HCC)    Cervical dysplasia    Nausea and vomiting during pregnancy (HCC)    Small for gestational age (HCC)    Hyperemesis affecting pregnancy, antepartum (HCC)    Vomiting of pregnancy (HCC)        TW lb (14.1 kg)   Depression Scale Total: 2 (2023  3:05 PM)      Gen: AAOx3, NAD  Resp: breathing comfortably  Abdomen: gravid, soft, nontender  Ext: nontender, no edema    Plan:  - R/o preeclampsia labs ordered. Symptoms reviewed, return precautions reviewed.  - GBS neg  - IOL scheduled 4/2    RTO in 1 week(s).

## 2024-03-12 NOTE — PROGRESS NOTES
Education Record    Learner:  Patient here for zofran/ivf    Disease / Diagnosis:    Barriers / Limitations:  None    Method:  Brief focused, printed material and  reinforcement    General Topics:  Plan of care reviewed    Outcome: pt tolerated infusions and reports feeling a little better. BP was 132/91. Pt declined BP recheck d/t appt next door in 5 min and they will recheck there.

## 2024-03-13 NOTE — TELEPHONE ENCOUNTER
Patient need an earlier time 130 or 200 pm time on 3/19/24. Called 3/13/24. Please Call the patient.

## 2024-03-18 NOTE — TELEPHONE ENCOUNTER
From: Judi Garnica  To: Lizeth Voss  Sent: 3/15/2024 6:41 AM CDT  Subject: Blood work    Good morning Dr. Voss,  I had a questions about my Alkaline Phosphatase levels on blood work. In February my level was 96 u/l and yesterday my level was 152 u/l. Is it normal for levels to double like that? I also began swelling in my feet, legs, and hands on Wednesday and it seems like the swelling has increased and have had diarrhea for the past two days. Are these symptoms common?     Thank you,  Judi

## 2024-03-19 NOTE — PATIENT INSTRUCTIONS
Labor Instructions    How do I know if it’s true labor?  One of the most important aspects of any pregnancy is being able to recognize the onset of labor.  Unfortunately, on occasion it can be difficult or confusing, especially if you have had one or more children.  Each labor can begin in a different way even if you have had four or five children.    If this is your first child, it is very common to have labor for an average greater than 10 hours; however, there have been rare instances for labor to be two hours or less for a first time mother. It is more important for you to know if this is your second or third baby to realize that any labor after the first is usually shorter.  There is no way to tell how long or short the labor will be. Therefore, please call us if you are unsure labor has started.       Usually, during the last six weeks of pregnancy, Rayo-Maxwell contractions or “false labor pains occur”.  False labor is generally not very painful though it is not always easy to tell.  You may feel contractions, cramps or uterine tightening somewhere between every 3-30 minutes but they will not continue to get stronger over time.  If you lie down, drink plenty of fluid or walk around, the contractions may go away.   False contractions are very common if you have been active on your feet for several hours.   Women frequently worry about being sent home from the hospital without having their baby (i.e. the labor stopped).  Actually, this is an unnecessary worry, for this is an infrequent occurrence.     True labor usually begins in one of two ways.  In most patients it begins with contractions of the uterus, which are irregular (but not always) in the beginning.  They are cramp-like in character and feel similar to menstrual cramps.  After a while, they become more regular, and they seem to last a little longer, and feel a little sharper.  These symptoms are very important-more important- than the timing of the  contractions.  Having regular (usually closer), longer lasting (35-70 seconds), and sharper (more painful) contractions are the common symptoms of actual labor.  The second way in which labor can begin which occurs in approximately 30% of all patients is the rupture of the bag of water.  This is a sudden gush of watery fluid, usually sufficient to run down your leg and onto the floor, or you may wet a large area of the bed if it happens at night.  There may also be tricking that is uncontrolled.  If you are unsure, please call the office.      When should I head to the hospital?  When contractions are strong and every 5 minutes for one hour.  If you have a gush of water or you think you might be leaking fluid.  If you are bleeding heavily.  If your baby is not moving around at least every 1-2 hours.  If you are worried about something.  When you think you are ready to go to the hospital.    Who do I call with concerns?  Call the office at [phone].  If the office is closed, the answering service will send a message to the physician on call.  The on call physician will be available for emergency phone calls only.          Can I eat in labor?  It is good to eat a light meal at home before going to the hospital.  Eat foods like crackers, popsicles, soup and fruit.  Avoid foods that are difficult to digest like meat, a lot of dairy products and high fat foods.  DO NOT EAT if you know you are scheduled for a  ().      What will happen when I get to the hospital?  You are going to [facility] in [facility].  After 8 pm, you will need to go through the Emergency Department.  When you arrive at the hospital, you will be admitted and examined.   There are a few factors that will determine if you will be allowed to be up out of bed or if you would need to stay in bed.  The main factors are how well the baby is entering into the pelvis and if the bag of mae is in intact or ruptured.   An intravenous (IV) solution will, with exceptions, be started.  This is extremely important especially for the baby.   Your  will be allowed in the room during your labor. During the delivery, the nurses will inform you of the hospital policy and how many coaches are allowed.  You may desire pain medication or anesthesia for pain.  You probably discussed some aspects of pain medication with us during your prenatal care.  The various options may also have been discussed in Prenatal Classes.  We utilize IV narcotics and epidural anesthesia when our patients request to have them.  If you chose to have no anesthesia, none will be administered.  A local anesthetic may be used at the time of delivery      What should I to bring to the hospital?  Maternity clothes to go home in  You can bring your own night gown to wear after giving birth, but most women prefer to wear the hospital gown because it may get soiled  Nursing Bra if you are planning to breastfeed  Clothes for your baby to go home in  Baby Car Seat.  Be sure you know how to install it correctly. Please install it before going to the hospital  Routine toiletries like toothbrush, shampoo, hairbrush and etc.   You can bring your favorite pillow, but please put a colored pillow case on it so it doesn’t get mixed up with hospital pillows    How long will I stay in the hospital?  The date you leave the hospital may vary depending on the speed of your recovery.  If you have a vaginal delivery, you will stay in the hospital 24-48 hours after your delivery as long as you aren’t having any complications.    If you have had a , you will stay in the hospital 48-72 hours as long as you aren’t having any complications.       Going Home Instructions  There are no set rules as to what you may do each day or week after you are home.  You will receive discharge instructions to help you each day.  Remember, early ambulation in the hospital is to prevent  complications.  Do not let this lull you into a false sense of strength or ability to do certain physical acts which may tire you excessively.  Please call the office within a few days after you are discharged from the hospital to schedule your post-partum visit, which is usually 4-6 weeks after delivery.    Any medications necessary will be discussed on an individual basis.  If you decide to breastfeed your baby, you should continue your prenatal vitamins.  If you do not breastfeed, simply finish the prenatal vitamins you have.      The staff at [facility] wish you a joyous and exciting birth.  If there is anything we can do to make this a better experience for you please do not hesitate to ask.        KICK COUNT INSTRUCTIONS    After 28 weeks of pregnancy, we would like for you to monitor your baby’s movement daily by doing kick counts, an easy way for you to assess your baby’s well-being.    How to count kicks:  Choose a time when the baby is active, such as after a meal.  Sit comfortably or lie on your side, and count the number of movements in an hour… you should feel 10 movements in 2 hours    The evening hours seem to be the most convenient time to do this test, although you can also do this test anytime you are concerned about the baby’s movement.    Call the office right away at [phone] if you notice any of the following:  Your baby moves less than 10 times in 2 hours while you are doing kick counts.  Your baby moves much less often than on the days before.  You have not felt your baby move all day.

## 2024-03-20 NOTE — PROGRESS NOTES
Education Record    Learner:  Patient    Disease / Diagnosis: patient  here for IVF/antiemetic     Barriers / Limitations:  None    Method:  Brief focused, printed material and  reinforcement    General Topics:  Plan of care reviewed    Outcome:  Shows understanding   Patient  tolerated infusion. No c/o. Dc'd home in stable condition.

## 2024-03-23 NOTE — PLAN OF CARE
Problem: BIRTH - VAGINAL/ SECTION  Goal: Fetal and maternal status remain reassuring during the birth process  Description: INTERVENTIONS:  - Monitor vital signs  - Monitor fetal heart rate  - Monitor uterine activity  - Monitor labor progression (vaginal delivery)  - DVT prophylaxis (C/S delivery)  - Surgical antibiotic prophylaxis (C/S delivery)  Outcome: Progressing     Problem: PAIN - ADULT  Goal: Verbalizes/displays adequate comfort level or patient's stated pain goal  Description: INTERVENTIONS:  - Encourage pt to monitor pain and request assistance  - Assess pain using appropriate pain scale  - Administer analgesics based on type and severity of pain and evaluate response  - Implement non-pharmacological measures as appropriate and evaluate response  - Consider cultural and social influences on pain and pain management  - Manage/alleviate anxiety  - Utilize distraction and/or relaxation techniques  - Monitor for opioid side effects  - Notify MD/LIP if interventions unsuccessful or patient reports new pain  - Anticipate increased pain with activity and pre-medicate as appropriate  Outcome: Progressing     Problem: ANXIETY  Goal: Will report anxiety at manageable levels  Description: INTERVENTIONS:  - Administer medication as ordered  - Teach and rehearse alternative coping skills  - Provide emotional support with 1:1 interaction with staff  Outcome: Progressing     Problem: Patient/Family Goals  Goal: Patient/Family Long Term Goal  Description: Patient's Long Term Goal: to have adequate pain management     Interventions:  -   - See additional Care Plan goals for specific interventions  Outcome: Progressing  Goal: Patient/Family Short Term Goal  Description: Patient's Short Term Goal: to have an uncomplicated vaginal delivery    Interventions:   -   - See additional Care Plan goals for specific interventions  Outcome: Progressing

## 2024-03-23 NOTE — PROGRESS NOTES
Pt is a 33 year old female admitted to [facility].     Chief Complaint   Patient presents with     Assessment     Patient has had diarrhea for the past week. Has had increased nausea and vomiting. Active FM. Denies labor complaints.       Pt is  38w4d intra-uterine pregnancy.  History obtained, consents signed. Oriented to room, staff, and plan of care.

## 2024-03-23 NOTE — TELEPHONE ENCOUNTER
Returned patient's page.  Patient reports nausea, vomiting and diarrhea.  Present for at least a week.  Patient states last bowel movement this morning and was diarrhea.  No sick contacts at home.  Denies fever, chills, chest pain or shortness of breath.  Patient advised to report to labor and delivery for further evaluation.  Patient agreeable.  Verbalized understanding.  All questions and concerns were addressed.    Teresa Means MD   [facility] - OBGYN

## 2024-03-24 NOTE — ANESTHESIA PROCEDURE NOTES
Labor Analgesia    Date/Time: 3/24/2024 9:57 AM    Performed by: Eleazar Hayes MD  Authorized by: Eleazar Hayes MD      General Information and Staff    Start Time:  3/24/2024 9:57 AM  End Time:  3/24/2024 11:05 AM  Anesthesiologist:  Eleazar Hayes MD  Performed by:  Anesthesiologist  Patient Location:  OB  Site Identification: surface landmarks    Reason for Block: labor epidural    Preanesthetic Checklist: patient identified, IV checked, risks and benefits discussed, monitors and equipment checked, pre-op evaluation, timeout performed, IV bolus, anesthesia consent and sterile technique used      Procedure Details    Patient Position:  Sitting  Prep: ChloraPrep    Monitoring:  Heart rate and continuous pulse ox  Approach:  Midline    Epidural Needle    Injection Technique:  CARYN saline  Needle Type:  Tuohy  Needle Gauge:  17 G  Needle Length:  3.375 in  Needle Insertion Depth:  5  Location:  L3-4    Spinal Needle      Catheter    Catheter Type:  End hole  Catheter Size:  19 G  Test Dose:  Negative    Assessment    Sensory Level:  T8    Additional Comments

## 2024-03-24 NOTE — H&P
[facility]  Obstetrics and Gynecology  History & Physical    Judi Garnica Patient Status:  Inpatient    1990 MRN [id number]   Location [facility] LABOR & DELIVERY Attending Teresa Means MD   Hospital Day 0 PCP Lizeth Voss MD     CC: Patient is here for IOL 2/2 gHTN    SUBJECTIVE:    Judi Garnica is a 33 year old  female at 38w4d Estimated Date of Delivery: 24 who is being admitted for IOL 2/2 gHTN. Her current obstetrical history is significant for newly diagnosed gHTN, persistent N/V and suspected SGA . The patient also had an elevated /91 on . Patient reported to triage with complaint of N, V and diarrhea. She felt better with IV fluids. Labs overall reassuring. However, the patient was noted to have a second elevated BP.Therefore, she met the diagnostic criteria for gHTN. Decision made for admission.     negative UCx.    negative VB.   negative LOF.    positive Fetal movement.   positive Nausea, Vomiting. denies headache, vision changes and RUQ/Epigastric pain.         GALE Confirmation  LMP: Patient's last menstrual period was 2023.  GALE: 2024, by Last Menstrual Period       Obstetric History:   OB History    Para Term  AB Living   1             SAB IAB Ectopic Multiple Live Births                  # Outcome Date GA Lbr Jeanmarie/2nd Weight Sex Delivery Anes PTL Lv   1 Current              Past Medical History:   Past Medical History:   Diagnosis Date    Acute sinusitis, unspecified     Acute tonsillitis     Acute tonsillitis 2011    Anxiety     Bloating daily    Body piercing ears    Chronic tonsillitis 2015    Constipation     Decorative tattoo 1    Diarrhea, unspecified     Easy bruising     Fatigue     Flatulence/gas pain/belching daily    Food intolerance     Frequent UTI     Heartburn     Heavy menses     High cholesterol 2021    History of cold sores 2017    History of mental disorder  Anxitety    Hypertriglyceridemia 09/28/2017    Indigestion 2019    Irregular bowel habits     Itch of skin     Menses painful     Nausea     Otalgia, unspecified     Other allergy, other than to medicinal agents 06/29/2012    Pruritus, unspecified 05/02/2017    Scoliosis (and kyphoscoliosis), idiopathic     Seasonal allergies     Skin blushing/flushing     Stool incontinence     Stress     Vomiting     Wears glasses     Weight gain      Past Social History:   Past Surgical History:   Procedure Laterality Date    COLPOSCOPY, CERVIX W/UPPER ADJACENT VAGINA; W/BIOPSY(S), CERVIX Bilateral 01/01/2020    TONSILLECTOMY       Family History:   Family History   Problem Relation Age of Onset    Arthritis Paternal Grandmother     Cancer Paternal Grandmother         lung, breast, and brain cancer    Breast Cancer Paternal Grandmother     Mental Disorder Paternal Grandmother     Diabetes Maternal Grandmother     Heart Disease Paternal Grandfather     Stroke Paternal Grandfather     Other (mental disability) Mother         suicide    Mental Disorder Mother     Other (mental disability) Maternal Grandfather     Heart Disorder Father     Diabetes Father     Heart Attack Father     Ovarian Cancer Maternal Aunt     Mental Disorder Sister      Social History:   Social History     Tobacco Use    Smoking status: Never    Smokeless tobacco: Never   Substance Use Topics    Alcohol use: Not Currently     Comment: Very rarely       Home Meds:   Medications Prior to Admission   Medication Sig Dispense Refill Last Dose    PROMETHAZINE 25 MG Oral Tab TAKE 1 TABLET BY MOUTH EVERY 6 HOURS AS NEEDED FOR NAUSEA 30 tablet 0 3/23/2024    promethazine 25 MG Oral Tab Take 1 tablet (25 mg total) by mouth every 6 (six) hours as needed for Nausea.   3/23/2024    ondansetron (ZOFRAN) 4 mg tablet Take 1 tablet (4 mg total) by mouth every 8 (eight) hours as needed for Nausea. 60 tablet 2 3/22/2024    prenatal vitamin with DHA 27-0.8-228 MG Oral Cap Take 1  capsule by mouth daily.   3/22/2024    [] valACYclovir 500 MG Oral Tab Take 2 tablets (1,000 mg total) by mouth daily for 5 days. Then continue with just one tablet once a day. 30 tablet 0      Allergies:   Allergies   Allergen Reactions    Amoxicillin HIVES    Other OTHER (SEE COMMENTS)    Seasonal OTHER (SEE COMMENTS)             OBJECTIVE:    Temp:  [98.2 °F (36.8 °C)-98.8 °F (37.1 °C)] 98.2 °F (36.8 °C)  Pulse:  [] 105  Resp:  [18] 18  BP: (105-143)/(75-92) 133/92  Body mass index is 28.9 kg/m².    General: AAO. NAD  Lungs: no tachypnea, retractions or cyanosis  CV: not examined  Abdomen: FHT present, gravid   Extremities: negative edema bilaterally, negative calf tenderness bilaterally, Sanket's sign negative bilaterally     FHT: moderate variability/150 BPM / Positive accelerations/Negative decelerations   TOCO: q 2-3 minutes    SVE:  50 / -2 per RN    [place]:  cephalic, EFW 6 lbs 8 oz    Prenatal Labs Brief Review   Blood Type:   Lab Results   Component Value Date    ABO A 2024    RH Positive 2024     GBS:  Negative      Inpatient labs:  Lab Results   Component Value Date    WBC 13.7 2024    HGB 11.6 2024    HCT 36.1 2024    .0 2024    CREATSERUM 0.79 2024    BUN 5 2024     2024    K 4.4 2024     2024    CO2 19.0 2024    GLU 84 2024    CA 9.6 2024    ALB 2.7 2024    ALKPHO 185 2024    BILT 0.3 2024    TP 7.1 2024    AST 23 2024    ALT 17 2024       ASSESSMENT/ PLAN:    Judi Garnica is a 33 year old  female at 38w4d Estimated Date of Delivery: 24 who is being admitted for IOL 2/2 gHTN.    Patient Active Problem List    Diagnosis    Pregnancy (HCC)    Elevated blood pressure affecting pregnancy in third trimester, antepartum ([facility])     3/12: /91 at infusion center   PI labs wnl. Asymptomatic. 2+ edema      Vomiting of pregnancy  (HCC)    Hyperemesis affecting pregnancy, antepartum (HCC)    Small for gestational age (HCC)     30 weeks.  Borderline, with poor weight gain due to recurrent N,V.    [ x ] Check growth - 39% EFW       Nausea and vomiting during pregnancy (HCC)     Recurrent 30 wks, severe.  Had HEG first trimester.  Zofran, Pepcid, OB triage  Trial of Reglan.  If no relief, referral GI-->receiving IVF      Supervision of normal first pregnancy, antepartum (HCC)    Cervical dysplasia    History of cold sores     Starting Valtrex 37wks due to current outbreak.      Anxiety    Hypertriglyceridemia    Other allergy, other than to medicinal agents       1. Labor:   - Cervical ripening: Cytotec 25 mcg per vagina every 4 hrs x 3-4 doses   - Plan to initiate Pitocin per protocol following cervical ripening  2. Fetal monitoring: CEFM  3. GBS: negative  4. Pain: May have IV pain medication for epidural per patient request  5. gHTN  - labs reviewed  - continue BP monitoring  - continue to monitor for signs/symptoms of pre eclampsia       Risks, benefits, alternatives and possible complications have been discussed in detail with the patient.  Pre-admission, admission, and post admission procedures and expectations were discussed in detail.  All questions answered, all appropriate consents will be signed at the Hospital. Admission is planned for today.  anticipated.    Teresa Means MD   [facility] - OBGYN          Note to patient and family   The 21st Century Cures Act makes medical notes available to patients in the interest of transparency.  However, please be advised that this is a medical document.  It is intended as [id number] communication.  It is written and medical language may contain abbreviations or verbiage that are technical and unfamiliar.  It may appear blunt or direct.  Medical documents are intended to carry relevant information, facts as evident, and the clinical opinion of the practitioner.

## 2024-03-24 NOTE — PROGRESS NOTES
[facility]  Obstetrics and Gynecology    OB/GYN: Intrapartum Progress Note     SUBJECTIVE:  Patient is a 33 year old  female at 38w5d admitted for IOL. Patient reports doing well. Pain well controlled.     OBJECTIVE:  Vitals:    24 1018 24 1021 24 1024 24 1027   BP: 105/61 104/76 112/65 113/73   Pulse: 102 110 104 101   Resp:       Temp:       TempSrc:       Weight:       Height:           Physical Exam:  General: AAO. NAD.   Fundus + gravid.    FHT: moderate variability / 150 BPM / + accel / +variable decel   [place]: q 2-3 min  SVE: 3/70/-1, s/p AROM with clear fluid     ASSESSMENT/PLAN:  Patient is a 33 year old  female at 38w5d admitted for IOL.     Doing well   Continue routine intrapartum care  Continue IOL. S/p cytotec x 3. Continue pitocin per protocol.    GBS negative   Continue epidural per request   gHTN. Continue BP monitoring. Continue to monitor for signs/symptoms of pre eclampsia.     Teresa Means MD   [facility] - OBGYN

## 2024-03-24 NOTE — ANESTHESIA PREPROCEDURE EVALUATION
PRE-OP EVALUATION    Patient Name: Judi Garnica    Admit Diagnosis: pregnanacy  Pregnancy (HCC)    Pre-op Diagnosis: Fetal tachycardia        Anesthesia Procedure:     * No surgeons found in log *    Pre-op vitals reviewed.  Temp: 98.1 °F (36.7 °C)  Pulse: 103  Resp: 18  BP: 117/77     Body mass index is 28.9 kg/m².    Current medications reviewed.  Hospital Medications:   lactated ringers IV bolus 1,000 mL  1,000 mL Intravenous Once    fentaNYL-bupivacaine 2 mcg/mL-0.125% in sodium chloride 0.9% 100 mL EPIDURAL infusion premix  12 mL/hr Epidural Continuous    [COMPLETED] fentaNYL (Sublimaze) 50 mcg/mL injection 100 mcg  100 mcg Epidural Once    EPHEDrine (PF) 25 MG/5 ML injection 5 mg  5 mg Intravenous PRN    nalbuphine (Nubain) 10 mg/mL injection 2.5 mg  2.5 mg Intravenous Q15 Min PRN    [COMPLETED] lactated ringers IV bolus 500 mL  500 mL Intravenous Once    lactated ringers infusion   Intravenous Continuous    dextrose in lactated ringers 5% infusion   Intravenous PRN    lactated ringers IV bolus 500 mL  500 mL Intravenous PRN    acetaminophen (Tylenol Extra Strength) tab 500 mg  500 mg Oral Q6H PRN    acetaminophen (Tylenol Extra Strength) tab 1,000 mg  1,000 mg Oral Q6H PRN    ibuprofen (Motrin) tab 600 mg  600 mg Oral Once PRN    ondansetron (Zofran) 4 MG/2ML injection 4 mg  4 mg Intravenous Q6H PRN    terbutaline (Brethine) 1 MG/ML injection 0.25 mg  0.25 mg Subcutaneous PRN    sodium citrate-citric acid (Bicitra) 500-334 MG/5ML oral solution 30 mL  30 mL Oral PRN    misoprostol (CYTOTEC) partial tablets 25 mcg  25 mcg Vaginal 6 times per day    oxyTOCIN in sodium chloride 0.9% (Pitocin) 30 Units/500mL infusion premix  62.5-900 gabriela-units/min Intravenous Continuous    oxyTOCIN in sodium chloride 0.9% (Pitocin) 30 Units/500mL infusion premix  0.5-20 gabriela-units/min Intravenous Continuous    HYDROmorphone (Dilaudid) 1 MG/ML injection 1 mg  1 mg Intravenous Q2H PRN    [COMPLETED]  dextrose in lactated ringers 5% infusion 1,000 mL  1,000 mL Intravenous Once    [COMPLETED] ondansetron (Zofran) 8 mg in sodium chloride 0.9% 104 mL IVPB  8 mg Intravenous Once       Outpatient Medications:     Medications Prior to Admission   Medication Sig Dispense Refill Last Dose    PROMETHAZINE 25 MG Oral Tab TAKE 1 TABLET BY MOUTH EVERY 6 HOURS AS NEEDED FOR NAUSEA 30 tablet 0 3/23/2024    promethazine 25 MG Oral Tab Take 1 tablet (25 mg total) by mouth every 6 (six) hours as needed for Nausea.   3/23/2024    ondansetron (ZOFRAN) 4 mg tablet Take 1 tablet (4 mg total) by mouth every 8 (eight) hours as needed for Nausea. 60 tablet 2 3/22/2024    prenatal vitamin with DHA 27-0.8-228 MG Oral Cap Take 1 capsule by mouth daily.   3/22/2024    [] valACYclovir 500 MG Oral Tab Take 2 tablets (1,000 mg total) by mouth daily for 5 days. Then continue with just one tablet once a day. 30 tablet 0        Allergies: Amoxicillin, Other, and Seasonal      Anesthesia Evaluation    Patient summary reviewed.    Anesthetic Complications  (-) history of anesthetic complications         GI/Hepatic/Renal    Negative GI/hepatic/renal ROS.                             Cardiovascular        Exercise tolerance: good     MET: >4         (+) hyperlipidemia                                  Endo/Other    Negative endo/other ROS.                              Pulmonary    Negative pulmonary ROS.                       Neuro/Psych        (+) anxiety                      Patient Active Problem List:     Other allergy, other than to medicinal agents     History of cold sores     Anxiety     Hypertriglyceridemia     Supervision of normal first pregnancy, antepartum (HCC)     Cervical dysplasia     Nausea and vomiting during pregnancy (HCC)     Small for gestational age (HCC)     Hyperemesis     Vomiting of pregnancy (HCC)     Elevated blood pressure affecting pregnancy in third trimester, antepartum (HCC)     Pregnancy (HCC)     Sinus  tachycardia            Past Surgical History:   Procedure Laterality Date    COLPOSCOPY, CERVIX W/UPPER ADJACENT VAGINA; W/BIOPSY(S), CERVIX Bilateral 01/01/2020    TONSILLECTOMY       Social History     Socioeconomic History    Marital status:    Occupational History    Occupation:    Tobacco Use    Smoking status: Never    Smokeless tobacco: Never   Vaping Use    Vaping Use: Never used   Substance and Sexual Activity    Alcohol use: Not Currently     Comment: Very rarely    Drug use: Never    Sexual activity: Not Currently   Other Topics Concern     Service No    Blood Transfusions No    Caffeine Concern Yes     Comment: 1 cup coffee daily    Sleep Concern No    Stress Concern No    Weight Concern No    Special Diet No    Exercise Yes     Comment: 3 x a week     Seat Belt Yes    Self-Exams Yes     History   Drug Use Unknown     Available pre-op labs reviewed.  Lab Results   Component Value Date    WBC 13.7 (H) 03/23/2024    RBC 3.92 03/23/2024    HGB 11.6 (L) 03/23/2024    HCT 36.1 03/23/2024    MCV 92.1 03/23/2024    MCH 29.6 03/23/2024    MCHC 32.1 03/23/2024    RDW 15.3 03/23/2024    .0 03/23/2024     Lab Results   Component Value Date     03/23/2024    K 4.4 03/23/2024     03/23/2024    CO2 19.0 (L) 03/23/2024    BUN 5 (L) 03/23/2024    CREATSERUM 0.79 03/23/2024    GLU 84 03/23/2024    CA 9.6 03/23/2024            Airway      Mallampati: I  Mouth opening: >3 FB  TM distance: > 6 cm  Neck ROM: full Cardiovascular    Cardiovascular exam normal.  Rhythm: regular  Rate: normal     Dental             Pulmonary    Pulmonary exam normal.                 Other findings          ASA: 2 and emergent  Plan: epidural  NPO status verified and patient meets guidelines.      Surgeon requests: regional block  Comment: C/s for fetal tachycardia, surgical anesthesia via epidural. Duramorph.  Plan/risks discussed with: patient and significant other              Present on  Admission:  **None**

## 2024-03-25 NOTE — PROGRESS NOTES
Patient transferred to mother/baby room 2217 per cart in stable condition with baby and personal belongings.  Accompanied by  and staff.  Report given to mother/baby RN.

## 2024-03-25 NOTE — CONSULTS
Cardiology Consultation    Judi Garnica Patient Status:  Inpatient    1990 MRN [id number]   Location [facility] 2SW-J Attending Teresa Means MD   Hosp Day # 2 PCP Lizeth Voss MD     Reason for Consultation:  sinus tachycardia      History of Present Illness:  Judi Garnica is a a(n) 33 year old female. Very nice woman, here with her  and  healthy baby girl.  She was admitted 3/23 38 weeks pregnant with HTN and nausea and vomiting.  Pitocin was started and on 3/24 pm became quite tachycardic and anxious.  Rhythm was sinus.  She underwent C section last night and fortunately everyone is well this morning.  BP stable, HR in the 80's.  Echo last night with EF near 50%, normal appearing right heart.  She has some edema, but BP's now normal.  She is comfortable on RA.      History:  Past Medical History:   Diagnosis Date    Acute sinusitis, unspecified     Acute tonsillitis     Acute tonsillitis 2011    Anxiety     Bloating daily    Body piercing ears    Chronic tonsillitis 2015    Constipation     Decorative tattoo 1    Diarrhea, unspecified     Easy bruising     Fatigue     Flatulence/gas pain/belching daily    Food intolerance     Frequent UTI     Heartburn     Heavy menses     High cholesterol 2021    History of cold sores 2017    History of mental disorder Anxitety    Hypertriglyceridemia 2017    Indigestion 2019    Irregular bowel habits     Itch of skin     Menses painful     Nausea     Otalgia, unspecified     Other allergy, other than to medicinal agents 2012    Pruritus, unspecified 2017    Scoliosis (and kyphoscoliosis), idiopathic     Seasonal allergies     Skin blushing/flushing     Stool incontinence     Stress     Vomiting     Wears glasses     Weight gain      Past Surgical History:   Procedure Laterality Date    COLPOSCOPY, CERVIX W/UPPER ADJACENT VAGINA; W/BIOPSY(S), CERVIX Bilateral 2020     TONSILLECTOMY       Family History   Problem Relation Age of Onset    Arthritis Paternal Grandmother     Cancer Paternal Grandmother         lung, breast, and brain cancer    Breast Cancer Paternal Grandmother     Mental Disorder Paternal Grandmother     Diabetes Maternal Grandmother     Heart Disease Paternal Grandfather     Stroke Paternal Grandfather     Other (mental disability) Mother         suicide    Mental Disorder Mother     Other (mental disability) Maternal Grandfather     Heart Disorder Father     Diabetes Father     Heart Attack Father     Ovarian Cancer Maternal Aunt     Mental Disorder Sister          Allergies:  Allergies   Allergen Reactions    Amoxicillin HIVES    Other OTHER (SEE COMMENTS)    Seasonal OTHER (SEE COMMENTS)             Medications:   acetaminophen  1,000 mg Oral Q6H    ketorolac  30 mg Intravenous Q6H    [START ON 3/26/2024] ibuprofen  600 mg Oral Q6H    docusate sodium  100 mg Oral BID@0600,1800       Continuous Infusions:   lactated ringers      dextrose in lactated ringers      oxyTOCIN in sodium chloride 0.9% 62.5 gabriela-units/min (03/25/24 0705)       Social History:   reports that she has never smoked. She has never used smokeless tobacco. She reports that she does not currently use alcohol. She reports that she does not use drugs.    Review of Systems:  All systems were reviewed and are negative except as described above in HPI.    Physical Exam:      Temp:  [98.1 °F (36.7 °C)-98.9 °F (37.2 °C)] 98.2 °F (36.8 °C)  Pulse:  [] 95  Resp:  [16-26] 16  BP: ()/(61-93) 129/90  SpO2:  [95 %-100 %] 98 %    Last 3 Weights   03/23/24 1915 158 lb (71.7 kg)   03/23/24 1515 158 lb (71.7 kg)   03/23/24 1351 158 lb (71.7 kg)   03/23/24 1228 158 lb (71.7 kg)   03/19/24 1552 158 lb (71.7 kg)   03/12/24 1605 153 lb (69.4 kg)           General: No apparent distress  HEENT: No focal deficits.  Neck: supple. JVP normal  Cardiac: Regular rhythm, S1, S2 normal,   No rub or gallop.  No  murmur.  Lungs: CTA  Abdomen: Soft, non-tender.   Extremities: 1+ edema  Neurologic: no focal deficits  Skin: Warm and dry.          Telemetry: sinus    Laboratories and Data:  Diagnostics:    EKG, 3/25/2024:  NSR, rightward axis, NSSTTW changes.    CXR, 3/25/2024:      Labs:   HEM:  Recent Labs   Lab 03/23/24  1221 03/24/24 2007   WBC 13.7* 19.2*   HGB 11.6* 10.7*   .0 218.0       Chem:  Recent Labs   Lab 03/23/24  1221 03/24/24 2007    139   K 4.4 4.4    110   CO2 19.0* 22.0   BUN 5* 4*   CREATSERUM 0.79 0.73   CA 9.6 9.3   MG  --  1.4*   GLU 84 166*       Recent Labs   Lab 03/23/24  1221 03/24/24 2007   ALT 17 13   AST 23 18   ALB 2.7* 2.1*       No results for input(s): \"PTP\", \"INR\" in the last 168 hours.    No results for input(s): \"TROP\", \"CK\" in the last 168 hours.      Impression:   Sinus tachycardia - resolved post delivery.  Suspect all is well.  S/p C section on 3/24 pm, healthy mom and baby.  Edema  Low normal EF on echo 3/25 am.    Plan:  Cardiac wise, she is stable.  Diuresis if necessary per OB.  Would have her obtain an echo in our office a few weeks post dc.    Devon Irving MD  3/25/2024  8:57 AM  C4

## 2024-03-25 NOTE — PROGRESS NOTES
Judi Garnica Patient Status:  Inpatient    1990 MRN [id number]   Location [facility] LABOR & DELIVERY Attending Teresa Means MD   Hosp Day # 1 PCP Lizeth Voss MD     Cardiology Nocturnal APN Note    Briefly: (Documentation from chart review)     Judi Garnica is a 32 y/o female with PMH/PSH of gestational HTN, hyperemesis gravidum and anxiety who presented for scheduled induction of labor. During labor she was noted to be tachycardic. A 12 led shows sinus tachycardia and Dr. Freed reviewed and feels it is lead reversal rather than true abnormal reading. D-dimer elevated which can occur during pregnancy. CTA not possible at the moment with active labor.    Primary Cardiologist None    Vital Signs:       3/24/2024    11:03 PM 3/24/2024    11:07 PM   Vitals History   /93    Pulse 131 116   SpO2  98 %        Labs:   Lab Results   Component Value Date    WBC 19.2 2024    HGB 10.7 2024    HCT 33.7 2024    .0 2024    CREATSERUM 0.73 2024    BUN 4 2024     2024    K 4.4 2024     2024    CO2 22.0 2024     2024    CA 9.3 2024    ALB 2.1 2024    ALKPHO 155 2024    BILT 0.3 2024    TP 5.5 2024    AST 18 2024    ALT 13 2024    DDIMER 3.48 2024    MG 1.4 2024    TROPHS <3 2024       Diagnostics:   XR CHEST AP/PA (1 VIEW) (CPT=71045)    Result Date: 3/24/2024  CONCLUSION:  There is no evidence of active cardiopulmonary disease on this single portable chest radiograph.   LOCATION:  [place]      Dictated by (CST): Josesito Manning MD on 3/24/2024 at 9:28 PM     Finalized by (CST): Josesito Manning MD on 3/24/2024 at 9:28 PM       US ABDOMEN COMPLETE (CPT=76700)    Result Date: 3/11/2024  CONCLUSION:  1. Unremarkable abdominal sonogram.  Please see details as above.   LOCATION:  [place]    Dictated by (CST): Carlene Ramos MD on  3/11/2024 at 8:43 AM     Finalized by (CST): Carlene Ramos MD on 3/11/2024 at 8:45 AM        Allergies:  Allergies   Allergen Reactions    Amoxicillin HIVES    Other OTHER (SEE COMMENTS)    Seasonal OTHER (SEE COMMENTS)             Medications:    fentaNYL-bupivacaine in sodium chloride 0.9%    EPHEDrine (PF)    nalbuphine    lactated ringers    dextrose in lactated ringers    lactated ringers    acetaminophen    acetaminophen    ibuprofen    ondansetron    terbutaline    sodium citrate-citric acid    oxyTOCIN in sodium chloride 0.9%    oxyTOCIN in sodium chloride 0.9%    HYDROmorphone    lidocaine PF    lidocaine-EPINEPHrine    ropivacaine    bupivacaine PF    Assessment:   Abnormal ECG  - Sinus tachycardia  - Per Dr. Freed review no acute changes, possible lead reversal  - d-dimer elevated, possibly normal with pregnancy rule out PE after delivery  - Echo pending      Plan:    - Continue to monitor overnight  - Formal Cardiology consult to follow in AM.       JESUSITA Carl  [facility]  3/24/2024  11:14 PM

## 2024-03-25 NOTE — OPERATIVE REPORT
[facility]  Obstetrics and Gynecology    OB/GYN: Intrapartum Progress Note     SUBJECTIVE:  Patient is a 33 year old  female at 38w6d admitted for IOL. Patient reports doing well. Pain well controlled.     OBJECTIVE:  Vitals:    24 2307 24 2352 24 2357 24 0001   BP:       Pulse: 116 (!) 126 104    Resp:       Temp:    98.4 °F (36.9 °C)   TempSrc:    Oral   SpO2: 98% 99% 99%    Weight:       Height:           Physical Exam:  General: AAO. NAD.   Fundus + gravid.    FHT: minimal variability / 175 BPM / no accel / +variable decels - recurrent  [place]: q 2-4 min  SVE: 10/100/+1    ASSESSMENT/PLAN:  Patient is a 33 year old  female at 38w6d admitted for IOL.     Doing well   Continue routine intrapartum care  NRFHT, fetal tachycardia. Fetal station +1 and minimal descent with maternal pushing. Fetal position OP and confirmed with BSUS. D/w patient operative delivery versus PCS including risks, benefits and alternatives. D/w patient that operative delivery can not be offered at this time as fetal station is +1 and concern for minimal descent during maternal pushing. Patient also expressed concern about operative delivery and requested to discuss other options. Discussed possible fetal head manual rotation but concern that success might be limited and could result in further impacting the fetal head into the pelvis prior to CS. Recommendation made for PCS 2/2 NRFHT and fetal tachycardia. Discussed risk of infection, bleeding and injury. Patient verbalized understanding. All questions and concerns were addressed. Patient agreeable to proceed with PCS.   Continue IOL. S/p cytotec x 4 doses. S/p AROM. Continue pitocin per protocol.   GBS negative   Continue epidural per request   Tachycardia, asymptomatic. Labs reviewed, noted for elevated D Dimer and leukocytosis. Afebrile. Normotensive. CXR wnl. Recommend hospitalist consult. Recommended CT chest angio but patient in active labor and  unable to complete recommended study. Plan for CT chest angio postpartum.   gHTN. Continue BP monitoring. Continue to monitor for signs/symptoms of pre eclampsia.     Teresa Means MD   [facility] - OBGYN

## 2024-03-25 NOTE — PROGRESS NOTES
Received patient from L&D RN via stretcher. ID bands verified and unit orientation discussed and plan of care agreed on. Vitals are stable and bleeding WNL. Patient is breastfeeding only at this time and breastfeeding well. All questions answered. Family at the bedside.

## 2024-03-25 NOTE — OPERATIVE REPORT
[facility]  Obstetrics and Gynecology   Section - Operative Note    Judi Garnica Patient Status:  Inpatient    1990 MRN [id number]   Location [facility] LABOR & DELIVERY Attending Teresa Means MD   Hosp Day # 2 PCP Lizeth Voss MD     Date of procedure: 24    Preoperative Diagnosis: IUP at Term, gHTN, NRFHT, fetal tachycardia         Postoperative Diagnosis: Same - Delivered, cephalopelvic disproportion     Procedure: Primary Low Transverse  Section    Primary surgeon: Teresa Means MD     Assistant: Lonnie Peterson MD who was present throughout the entire case and was critical in ensuring adequate exposure for patient's safety and optimization of outcome.  The physician actively assisted in retraction, exposure, hemostasis, entry/closure as well as other essential operative technical functions.    Indications:  Patient is a 33 year old  at 38w6d who presented for N/V, diarrhea and subsequently diagnosed with gHTN therefore IOL recommended. She has a history of persistent N/V and suspected SGA. She received cytotec per protocol x 4 doses. Pitocin was initiated. AROM performed. She progressed to complete cervical dilation. Maternal pushing initiated. Minimal fetal descent noted. NRFHT noted with minimal variability, recurrent decelerations and fetal tachycardia. D/w patient operative delivery versus PCS including risks, benefits and alternatives. D/w patient that operative delivery can not be offered at this time as fetal station is +1 and concern for minimal descent during maternal pushing. Patient also expressed concern about operative delivery and requested to discuss other options. Recommendation made for PCS 2/2 NRFHT and fetal tachycardia. D/w patient suspected  cephalopelvic disproportion with OP fetal position. The risks, benefits and alternatives were d/w patient including but not limited to risk of injury, infection, bleeding and subsequent   section deliveries. All questions and concerns were addressed. Patient provided verbal and written consent.     Surgical Findings: normal tubes and ovaries   Baby - female \"Rj\", apgars 9/9, wt 3030 g  Nuchal x 1  Direct OP with full extension of fetal neck    Anesthesia: Spinal    EBL: 600 cc    Procedure: The patient was prepped and draped in the usual sterile fashion. A time out was performed and scd’s were placed to decrease her risk for DVT and a dose of antibiotics was given preoperatively to decrease her risk for infection. After adequate level of anesthesia was ascertained, a Pfannenstiel incision was made and extended down to the level of the fascia. The fascia was incised and extended laterally bluntly.  The rectus muscles were  superiorly and inferiorly.  The peritoneal cavity was entered superiorly and extended inferiorly. An Ayush O retractor was placed and adequate visualization of lower uterine segment was noted. The vesicouterine peritoneal fold was identified and bladder flap was created using Metzenbaum scissors with pickups. A low transverse incision was made with scalpel, a Dayanara clamp was used to penetrate through final layer and and the incision was extended using blunt finger dissection. The fetal head as noted to be cephalic direct OP with full extension of the fetal neck.  There is difficulty reaching around the fetal head inside the pelvis with multiple attempts.  Therefore, assistance was provided vaginally to flex the fetal head anteriorly which then allowed the suction pressure to be released and the fetal head was further flexed from the abdominal approach and rotated. The fetal head was brought towards the incision. Fundal pressure applied and fetal head delivered without complications. Nuchal cord x 1. The remainder of the fetal body delivered without complication. The infant was vigorously crying. The cord was clamped and cut after 30 second delay. The infant  was placed in the warmer to be evaluated by Neonatology who was present for delivery. A segment of the cord was obtained for cord blood gas analysis. Cord blood was obtained. The placenta was delivered intact with a 3 vessel cord. The pelvic organs were not exteriorized but palpated and appeared to be normal during the procedure. The uterine cavity was swept clean using a wet lap. The hysterotomy was closed using 0-Vicryl suture. A second imbricated layer of the same suture was placed. Good hemostasis noted. Re-inspection of the hysterotomy, peritomeum and rectus muscles was noted to be entirely hemostatic. The fascia was re-approximated with 0-Vicry suture in a running fashion. The subcutaneous tissue was copiously irrigated and any bleeding cauterized. The subcutaneous tissue was re-approximated using Plain gut suture. The skin was re-approximated with 3-0 monocryl on Peña Needle.  The sponge and instrument counts were reported to me as being correct.  Cytotec 1000 mcg per rectum place due to maternal with fetal tachycardia and concern for possible development of intrauterine infection that could increase the risk of postpartum hemorrhage. The patient tolerated the procedure and was stable to the recovery room.  The infant was stable to the recovery room.      Specimen:  none    Drains: ugarte to dependant drainage    Condition:   stable    Complications: None; patient tolerated the procedure well.      Teresa Means MD   [facility] - OBGYN          Note to patient and family   The 21st Century Cures Act makes medical notes available to patients in the interest of transparency.  However, please be advised that this is a medical document.  It is intended as [id number] communication.  It is written and medical language may contain abbreviations or verbiage that are technical and unfamiliar.  It may appear blunt or direct.  Medical documents are intended to carry relevant information, facts as evident, and the clinical  opinion of the practitioner.

## 2024-03-25 NOTE — CONSULTS
[place] HOSPITALIST  CONSULT     Judi Garnica Patient Status:  Inpatient    1990 MRN [id number]   Location [facility] LABOR & DELIVERY Attending Teresa Means MD   Hosp Day # 1 PCP Lizeth Voss MD     Reason for consult: Sinus tachycardia    Requested by: Teresa Cabral    History of Present Illness: Judi Garnica is a 33 year old female with PMHx , Gestational HTN, Hyperemesis gravidum, Anxiety who is current in labor, consulted for tachycardia.     Patient noted to be currently 9 cm dilated, noted to be sinus tachycardia and thus hospice consulted.  Patient does note that she has been anxious, in pain during episode of tachycardia.  Also notes that she has been having hyperemesis for the past few days prior to labor.  Improved with IV fluids on admission.  Denies any other chest pain, shortness of breath, lightheadedness, dizziness.  No prior history of cardiac or lung disease.    Past Medical History:  Past Medical History:   Diagnosis Date    Acute sinusitis, unspecified     Acute tonsillitis     Acute tonsillitis 2011    Anxiety     Bloating daily    Body piercing ears    Chronic tonsillitis 2015    Constipation     Decorative tattoo 1    Diarrhea, unspecified     Easy bruising     Fatigue     Flatulence/gas pain/belching daily    Food intolerance     Frequent UTI     Heartburn     Heavy menses     High cholesterol 2021    History of cold sores 2017    History of mental disorder Anxitety    Hypertriglyceridemia 2017    Indigestion 2019    Irregular bowel habits     Itch of skin     Menses painful     Nausea     Otalgia, unspecified     Other allergy, other than to medicinal agents 2012    Pruritus, unspecified 2017    Scoliosis (and kyphoscoliosis), idiopathic     Seasonal allergies     Skin blushing/flushing     Stool incontinence     Stress     Vomiting     Wears glasses     Weight gain         Past Surgical  History:   Past Surgical History:   Procedure Laterality Date    COLPOSCOPY, CERVIX W/UPPER ADJACENT VAGINA; W/BIOPSY(S), CERVIX Bilateral 01/01/2020    TONSILLECTOMY         Social History:  reports that she has never smoked. She has never used smokeless tobacco. She reports that she does not currently use alcohol. She reports that she does not use drugs.    Family History:   Family History   Problem Relation Age of Onset    Arthritis Paternal Grandmother     Cancer Paternal Grandmother         lung, breast, and brain cancer    Breast Cancer Paternal Grandmother     Mental Disorder Paternal Grandmother     Diabetes Maternal Grandmother     Heart Disease Paternal Grandfather     Stroke Paternal Grandfather     Other (mental disability) Mother         suicide    Mental Disorder Mother     Other (mental disability) Maternal Grandfather     Heart Disorder Father     Diabetes Father     Heart Attack Father     Ovarian Cancer Maternal Aunt     Mental Disorder Sister        Allergies:   Allergies   Allergen Reactions    Amoxicillin HIVES    Other OTHER (SEE COMMENTS)    Seasonal OTHER (SEE COMMENTS)             Medications:    Current Facility-Administered Medications on File Prior to Encounter   Medication Dose Route Frequency Provider Last Rate Last Admin    [COMPLETED] dextrose in lactated ringers 5% infusion 1,000 mL  1,000 mL Intravenous Once Finn Kelley MD   Stopped at 03/07/24 1615    [COMPLETED] ondansetron (Zofran) 8 mg in sodium chloride 0.9% 104 mL IVPB  8 mg Intravenous Once Finn Kelley MD   Stopped at 03/07/24 1510    [COMPLETED] dextrose in lactated ringers 5% infusion 1,000 mL  1,000 mL Intravenous Once Finn Kelley MD   Stopped at 02/26/24 1247    [COMPLETED] lactated ringers IV bolus 500 mL  500 mL Intravenous Once Lizeth Voss MD   Stopped at 02/09/24 1729    [COMPLETED] ondansetron (Zofran) 4 MG/2ML injection 4 mg  4 mg Intravenous Once Lizeth Voss MD   4 mg at 02/09/24 1527     [COMPLETED] famotidine (Pepcid) 20 mg/2mL injection 20 mg  20 mg Intravenous Once Lizeth Voss MD   20 mg at 02/09/24 1524    [COMPLETED] promethazine (Phenergan) 6.25 MG/5ML oral syrup 12.5 mg  10 mL Oral Once Lizeth Voss MD   12.5 mg at 02/09/24 1605     Current Outpatient Medications on File Prior to Encounter   Medication Sig Dispense Refill    ondansetron (ZOFRAN) 4 mg tablet Take 1 tablet (4 mg total) by mouth every 8 (eight) hours as needed for Nausea. 60 tablet 2    prenatal vitamin with DHA 27-0.8-228 MG Oral Cap Take 1 capsule by mouth daily.         Review of Systems:   A comprehensive 14 point review of systems was completed.    Pertinent positives and negatives noted in the HPI.    Physical Exam:    /74   Pulse 120   Temp 98.2 °F (36.8 °C) (Oral)   Resp 18   Ht 5' 2\" (1.575 m)   Wt 158 lb (71.7 kg)   LMP 06/27/2023   SpO2 99%   BMI 28.90 kg/m²   General: No acute distress. Alert and oriented x 3.  HEENT: Normocephalic atraumatic. Moist mucous membranes. EOM-I. PERRLA. Anicteric.  Neck: No lymphadenopathy. No JVD. No carotid bruits.  Respiratory: Clear to auscultation bilaterally. No wheezes. No rhonchi.  Cardiovascular: S1, S2. Regular but tachycardic rate and rhythm. No murmurs, rubs or gallops. Equal pulses.   Chest and Back: No tenderness or deformity.  Abdomen: Soft, nontender, gravid uterus.  Positive bowel sounds. No rebound, guarding or organomegaly.  Neurologic: No focal neurological deficits. CNII-XII grossly intact.  Musculoskeletal: Moves all extremities.  Extremities: No edema or cyanosis.  Integument: No rashes or lesions.   Psychiatric: Appropriate mood and affect.      Diagnostic Data:      Labs:  Recent Labs   Lab 03/23/24  1221 03/24/24 2007   WBC 13.7* 19.2*   HGB 11.6* 10.7*   MCV 92.1 92.8   .0 218.0       Recent Labs   Lab 03/23/24  1221 03/24/24 2007   GLU 84 166*   BUN 5* 4*   CREATSERUM 0.79 0.73   CA 9.6 9.3   ALB 2.7* 2.1*    139   K 4.4  4.4    110   CO2 19.0* 22.0   ALKPHO 185* 155*   AST 23 18   ALT 17 13   BILT 0.3 0.3   TP 7.1 5.5*       No results for input(s): \"PTP\", \"INR\" in the last 168 hours.    No results for input(s): \"TROP\", \"CK\" in the last 168 hours.    Imaging: Imaging data reviewed in Epic.      ASSESSMENT / PLAN:     #  active labor   - Per OB/GYN    # sinus tachycardia   - Suspect secondary to pain, anxiety, dehydration from emesis, active labor   - EKG reviewed, largely unchanged from prior, noted to be sinus tachycardia   - Agree with cardiology consult and TTE when able   - D-dimer elevated though in setting of pregnancy in labor, can cause false elevations.  Can complete workup with CTA chest postpartum.   - TSH ordered     Quality:  DVT Prophylaxis: SCDs  CODE status: FULL  Bloom: None    Plan of care discussed with Pt, Pt's , RN    Cory Garcia MD  3/24/2024

## 2024-03-25 NOTE — PROGRESS NOTES
recovery finished. Patient taken for CT via bed by transport in stable condition.  and patient's  waiting in 131 with this nurse.

## 2024-03-25 NOTE — ANESTHESIA POSTPROCEDURE EVALUATION
[facility]    Judi Garnica Patient Status:  Inpatient   Age/Gender 33 year old female MRN [id number]   Location [facility] LABOR & DELIVERY Attending Teresa Means MD   Hosp Day # 2 PCP Lizeth Voss MD       Anesthesia Post-op Note     SECTION    Procedure Summary       Date: 24 Room / Location:  L+D OR   L+D OR    Anesthesia Start: 957 Anesthesia Stop: 24    Procedure:  SECTION Diagnosis:     Surgeons: Teresa Means MD Anesthesiologist: Myerson, David, MD    Anesthesia Type: epidural ASA Status: 2 - Emergent            Anesthesia Type: epidural    Vitals Value Taken Time   /67 24   Temp 98 °F (36.7 °C) 24   Pulse 123 24   Resp 18 24   SpO2 100 % 24       Patient Location: PACU    Anesthesia Type: epidural    Airway Patency: patent    Postop Pain Control: adequate    Mental Status: preanesthetic baseline    Nausea/Vomiting: none    Cardiopulmonary/Hydration status: stable euvolemic    Complications: no apparent anesthesia related complications    Postop vital signs: stable    Dental Exam: Unchanged from Preop    Patient to be discharged from PACU when criteria met.

## 2024-03-25 NOTE — PROGRESS NOTES
[facility]  Obstetrics and Gynecology    OB/GYN: Intrapartum Progress Note     SUBJECTIVE:  Patient is a 33 year old  female at 38w5d admitted for IOL. Patient reports doing well. Pain well controlled. Denies fever, chills, chest pain and SOB.     OBJECTIVE:  Vitals:    24   BP:       Pulse: 119 114 106    Resp:       Temp:    98.2 °F (36.8 °C)   TempSrc:    Oral   SpO2: 99% 98% 98%    Weight:       Height:           Physical Exam:  General: AAO. NAD.   CV: tachycardia.   Lungs: CTAB.   Fundus + gravid.    FHT: moderate variability with periods of minimal/ 150 BPM / + accel / no decel   [place]: q 2-3 min  SVE: anterior lip/100/0       Recent Results (from the past 72 hour(s))   Comp Metabolic Panel (14)    Collection Time: 24 12:21 PM   Result Value Ref Range    Glucose 84 70 - 99 mg/dL    Sodium 136 136 - 145 mmol/L    Potassium 4.4 3.5 - 5.1 mmol/L    Chloride 109 98 - 112 mmol/L    CO2 19.0 (L) 21.0 - 32.0 mmol/L    Anion Gap 8 0 - 18 mmol/L    BUN 5 (L) 9 - 23 mg/dL    Creatinine 0.79 0.55 - 1.02 mg/dL    Calcium, Total 9.6 8.5 - 10.1 mg/dL    Calculated Osmolality 278 275 - 295 mOsm/kg    eGFR-Cr 102 >=60 mL/min/1.73m2    AST 23 15 - 37 U/L    ALT 17 13 - 56 U/L    Alkaline Phosphatase 185 (H) 37 - 98 U/L    Bilirubin, Total 0.3 0.1 - 2.0 mg/dL    Total Protein 7.1 6.4 - 8.2 g/dL    Albumin 2.7 (L) 3.4 - 5.0 g/dL    Globulin  4.4 2.8 - 4.4 g/dL    A/G Ratio 0.6 (L) 1.0 - 2.0    Patient Fasting for CMP? No    Uric Acid    Collection Time: 24 12:21 PM   Result Value Ref Range    Uric Acid 3.6 2.6 - 6.0 mg/dL   Protein/Creatinine Ratio, Urine Random    Collection Time: 24 12:21 PM   Result Value Ref Range    Total Protein Urine Random 40.1 mg/dL    Creatinine Ur Random 147.00 mg/dL    Urine Protein/Creatinine Ratio, Random 0.27    CBC W/ DIFFERENTIAL    Collection Time: 24 12:21 PM   Result Value Ref Range    WBC 13.7 (H) 4.0  - 11.0 x10(3) uL    RBC 3.92 3.80 - 5.30 x10(6)uL    HGB 11.6 (L) 12.0 - 16.0 g/dL    HCT 36.1 35.0 - 48.0 %    .0 150.0 - 450.0 10(3)uL    MCV 92.1 80.0 - 100.0 fL    MCH 29.6 26.0 - 34.0 pg    MCHC 32.1 31.0 - 37.0 g/dL    RDW 15.3 %    Neutrophil Absolute Prelim 10.19 (H) 1.50 - 7.70 x10 (3) uL    Neutrophil Absolute 10.19 (H) 1.50 - 7.70 x10(3) uL    Lymphocyte Absolute 2.26 1.00 - 4.00 x10(3) uL    Monocyte Absolute 1.13 (H) 0.10 - 1.00 x10(3) uL    Eosinophil Absolute 0.04 0.00 - 0.70 x10(3) uL    Basophil Absolute 0.04 0.00 - 0.20 x10(3) uL    Immature Granulocyte Absolute 0.06 0.00 - 1.00 x10(3) uL    Neutrophil % 74.3 %    Lymphocyte % 16.5 %    Monocyte % 8.2 %    Eosinophil % 0.3 %    Basophil % 0.3 %    Immature Granulocyte % 0.4 %   T Pallidum Screening Cascade    Collection Time: 03/23/24  1:48 PM   Result Value Ref Range    Treponemal Antibodies Nonreactive Nonreactive    ABORH (Blood Type)    Collection Time: 03/23/24  1:48 PM   Result Value Ref Range    ABO BLOOD TYPE A     RH BLOOD TYPE Positive    Antibody Screen    Collection Time: 03/23/24  1:48 PM   Result Value Ref Range    Antibody Screen Negative    D-Dimer    Collection Time: 03/24/24  8:07 PM   Result Value Ref Range    D-Dimer 3.48 (H) <0.50 ug/mL FEU   Troponin I (High Sensitivity)    Collection Time: 03/24/24  8:07 PM   Result Value Ref Range    Troponin I (High Sensitivity) <3 <=54 ng/L   Pro Beta Natriuretic Peptide    Collection Time: 03/24/24  8:07 PM   Result Value Ref Range    Pro-Beta Natriuretic Peptide 37 <125 pg/mL   CBC W/ DIFFERENTIAL    Collection Time: 03/24/24  8:07 PM   Result Value Ref Range    WBC 19.2 (H) 4.0 - 11.0 x10(3) uL    RBC 3.63 (L) 3.80 - 5.30 x10(6)uL    HGB 10.7 (L) 12.0 - 16.0 g/dL    HCT 33.7 (L) 35.0 - 48.0 %    .0 150.0 - 450.0 10(3)uL    MCV 92.8 80.0 - 100.0 fL    MCH 29.5 26.0 - 34.0 pg    MCHC 31.8 31.0 - 37.0 g/dL    RDW 15.4 %    Neutrophil Absolute Prelim 15.78 (H) 1.50 - 7.70 x10  (3) uL    Neutrophil Absolute 15.78 (H) 1.50 - 7.70 x10(3) uL    Lymphocyte Absolute 1.65 1.00 - 4.00 x10(3) uL    Monocyte Absolute 1.59 (H) 0.10 - 1.00 x10(3) uL    Eosinophil Absolute 0.02 0.00 - 0.70 x10(3) uL    Basophil Absolute 0.04 0.00 - 0.20 x10(3) uL    Immature Granulocyte Absolute 0.11 0.00 - 1.00 x10(3) uL    Neutrophil % 82.2 %    Lymphocyte % 8.6 %    Monocyte % 8.3 %    Eosinophil % 0.1 %    Basophil % 0.2 %    Immature Granulocyte % 0.6 %   Comp Metabolic Panel (14)    Collection Time: 03/24/24  8:07 PM   Result Value Ref Range    Glucose 166 (H) 70 - 99 mg/dL    Sodium 139 136 - 145 mmol/L    Potassium 4.4 3.5 - 5.1 mmol/L    Chloride 110 98 - 112 mmol/L    CO2 22.0 21.0 - 32.0 mmol/L    Anion Gap 7 0 - 18 mmol/L    BUN 4 (L) 9 - 23 mg/dL    Creatinine 0.73 0.55 - 1.02 mg/dL    Calcium, Total 9.3 8.5 - 10.1 mg/dL    Calculated Osmolality 289 275 - 295 mOsm/kg    eGFR-Cr 113 >=60 mL/min/1.73m2    AST 18 15 - 37 U/L    ALT 13 13 - 56 U/L    Alkaline Phosphatase 155 (H) 37 - 98 U/L    Bilirubin, Total 0.3 0.1 - 2.0 mg/dL    Total Protein 5.5 (L) 6.4 - 8.2 g/dL    Albumin 2.1 (L) 3.4 - 5.0 g/dL    Globulin  3.4 2.8 - 4.4 g/dL    A/G Ratio 0.6 (L) 1.0 - 2.0   Magnesium    Collection Time: 03/24/24  8:07 PM   Result Value Ref Range    Magnesium 1.4 (L) 1.6 - 2.6 mg/dL   EKG 12 Lead    Collection Time: 03/24/24  8:23 PM   Result Value Ref Range    Ventricular rate 120 BPM    Atrial rate 120 BPM    P-R Interval 146 ms    QRS Duration 68 ms    Q-T Interval 304 ms    QTC Calculation (Bezet) 429 ms    P Axis  degrees    R Axis 147 degrees    T Axis 142 degrees     CXR 03/24/24  FINDINGS:  Lungs and pleural spaces are clear.  Cardiac size is within normal limits.  Mediastinum and nishi are unremarkable.  Chest wall structures are unremarkable.                   Impression   CONCLUSION:  There is no evidence of active cardiopulmonary disease on this single portable chest radiograph.        LOCATION:  [facility]                  Dictated by (CST): Josesito Manning MD on 3/24/2024 at 9:28 PM      Finalized by (CST): Josesito Manning MD on 3/24/2024 at 9:28 PM       ASSESSMENT/PLAN:  Patient is a 33 year old  female at 38w5d admitted for IOL.     Doing well   Continue routine intrapartum care  Continue IOL. S/p cytotec x 4 doses. S/p AROM. Continue pitocin per protocol.   GBS negative   Continue epidural per request   Tachycardia, asymptomatic. Labs reviewed, noted for elevated D Dimer and leukocytosis. Afebrile. Normotensive. CXR wnl. Recommend hospitalist consult. Recommended CT chest angio but patient in active labor and unable to complete recommended study. Plan for CT chest angio postpartum.   gHTN. Continue BP monitoring. Continue to monitor for signs/symptoms of pre eclampsia.     Teresa Means MD   [facility] - OBGYN

## 2024-03-25 NOTE — PROGRESS NOTES
[facility] 2SW-J daniel    Judi Garnica Patient Status:  Inpatient   Age/Gender 33 year old female MRN [id number]   Location [facility] 2SW-J Attending Teresa Means MD   Hosp Day # 2 PCP Lizeth Voss MD      Anesthesia Pain Progress Note    Anesthesia Technique:   Epidural Anesthesia     Pain Management Technique:  In addition to available oral supplemental and IV medications  Patient received neuraxial preservative free morphine for post procedural pain control.    Post Procedure Pain Quality:    Adequate    Pain Management Side Effects:  None     /90 (BP Location: Right arm)   Pulse 90   Temp 98.2 °F (36.8 °C) (Oral)   Resp 16   Ht 1.575 m (5' 2\")   Wt 71.7 kg (158 lb)   LMP 2023   SpO2 97%   Breastfeeding Yes   BMI 28.90 kg/m²       Injection Site: Injection site is intact on inspection     Complications from Pain Management or Anesthesia:   None    All patient questions were answered.  Follow up pain management is separate from intraoperative anesthetic needs.  Pain care is transitioned to primary service, with management by oral medications.    Thank you for asking us to participate in the care of your patient.    Gabriel Schneider MD  [facility]      Gabriel Schneider MD, 24, 8:38 AM      Gabriel Schneider MD, 24, 8:38 AM

## 2024-03-25 NOTE — PLAN OF CARE
Problem: BIRTH - VAGINAL/ SECTION  Goal: Fetal and maternal status remain reassuring during the birth process  Description: INTERVENTIONS:  - Monitor vital signs  - Monitor fetal heart rate  - Monitor uterine activity  - Monitor labor progression (vaginal delivery)  - DVT prophylaxis (C/S delivery)  - Surgical antibiotic prophylaxis (C/S delivery)  3/25/2024 0236 by Jacinta Matute RN  Outcome: Completed  3/24/2024 2038 by Jacinta Matute RN  Outcome: Progressing     Problem: PAIN - ADULT  Goal: Verbalizes/displays adequate comfort level or patient's stated pain goal  Description: INTERVENTIONS:  - Encourage pt to monitor pain and request assistance  - Assess pain using appropriate pain scale  - Administer analgesics based on type and severity of pain and evaluate response  - Implement non-pharmacological measures as appropriate and evaluate response  - Consider cultural and social influences on pain and pain management  - Manage/alleviate anxiety  - Utilize distraction and/or relaxation techniques  - Monitor for opioid side effects  - Notify MD/LIP if interventions unsuccessful or patient reports new pain  - Anticipate increased pain with activity and pre-medicate as appropriate  3/25/2024 0236 by Jacinta Matute RN  Outcome: Completed  3/24/2024 2038 by Jacinta Matute RN  Outcome: Progressing     Problem: ANXIETY  Goal: Will report anxiety at manageable levels  Description: INTERVENTIONS:  - Administer medication as ordered  - Teach and rehearse alternative coping skills  - Provide emotional support with 1:1 interaction with staff  3/25/2024 0236 by Jacinta Matute RN  Outcome: Completed  3/24/2024 2038 by Jacinta Matute RN  Outcome: Progressing     Problem: Patient/Family Goals  Goal: Patient/Family Long Term Goal  Description: Patient's Long Term Goal: to have adequate pain management     Interventions:  -   - See additional Care Plan goals for specific interventions  3/25/2024 0236  by Jacinta Matute, RN  Outcome: Completed  3/24/2024 2038 by Jacinta Matute, RN  Outcome: Progressing  Goal: Patient/Family Short Term Goal  Description: Patient's Short Term Goal: to have an uncomplicated vaginal delivery    Interventions:   -   - See additional Care Plan goals for specific interventions  3/25/2024 0236 by Jacinta Matute, RN  Outcome: Completed  3/24/2024 2038 by Jacinta Matute, RN  Outcome: Progressing

## 2024-03-25 NOTE — PROGRESS NOTES
[facility]   part of [facility]     Hospitalist Progress Note     Judi Garnica Patient Status:  Inpatient    1990 MRN [id number]   Location [facility] 2SW-J Attending Teresa Means MD   Hosp Day # 2 PCP Lizeth Voss MD     Chief Complaint: Tachycardia    Subjective:   Patient feeling better. No chest pain or shortness of breath.     Current medications:   acetaminophen  1,000 mg Oral Q6H    ketorolac  30 mg Intravenous Q6H    [START ON 3/26/2024] ibuprofen  600 mg Oral Q6H    docusate sodium  100 mg Oral BID@0600,1800       Objective:    Review of Systems:   10 point ROS completed and was negative, except for pertinent positive and negatives stated in subjective.    Vital signs:  Temp:  [98.1 °F (36.7 °C)-98.9 °F (37.2 °C)] 98.3 °F (36.8 °C)  Pulse:  [] 91  Resp:  [16-26] 18  BP: ()/(62-93) 123/84  SpO2:  [95 %-100 %] 99 %  Patient Weight for the past 72 hrs:   Weight   24 1228 158 lb (71.7 kg)   24 1351 158 lb (71.7 kg)   24 1515 158 lb (71.7 kg)   24 1915 158 lb (71.7 kg)     Physical Exam:    General: No acute distress.   Respiratory: Diminished  Cardiovascular: S1, S2. Regular   Extremities: + edema.  Neuro: AAOx3    Diagnostic Data:    Labs:  Recent Labs   Lab 24  1221 24   WBC 13.7* 19.2*   HGB 11.6* 10.7*   MCV 92.1 92.8   .0 218.0       Recent Labs   Lab 24  1221 24   GLU 84 166*   BUN 5* 4*   CREATSERUM 0.79 0.73   CA 9.6 9.3   ALB 2.7* 2.1*    139   K 4.4 4.4    110   CO2 19.0* 22.0   ALKPHO 185* 155*   AST 23 18   ALT 17 13   BILT 0.3 0.3   TP 7.1 5.5*       Estimated Creatinine Clearance: 88.3 mL/min (based on SCr of 0.73 mg/dL).    No results for input(s): \"PTP\", \"INR\" in the last 168 hours.    Lab Results   Component Value Date    TSH 0.675 2024       COVID-19 Lab Results    COVID-19  Lab Results   Component Value Date    COVID19 Not Detected 2021    COVID19  Not Detected 2020       Pro-Calcitonin  No results for input(s): \"PCT\" in the last 168 hours.    Cardiac  Recent Labs   Lab 24   PBNP 37       Creatinine Kinase  No results for input(s): \"CK\" in the last 168 hours.    Inflammatory Markers  Recent Labs   Lab 24   DDIMER 3.48*       Recent Labs   Lab 24   TROPHS <3       Imaging: Imaging data reviewed in Epic.    Medications:    acetaminophen  1,000 mg Oral Q6H    ketorolac  30 mg Intravenous Q6H    [START ON 3/26/2024] ibuprofen  600 mg Oral Q6H    docusate sodium  100 mg Oral BID@0600,1800       Assessment & Plan:    Post-partum, IUP sp  3/25  Per OB  Sinus tachycardia, resolved  Monitor hemodynamics  Cardiology consult   Volume overload. ECHO EF 50%  Consider diuresis pending course  Incidental 3mm RML nodule, no risk factors  No follow-up indicated, discussed with patient     Plan of care discussed with patient,  and RN.    Hospitalist service will sign off.     Rai Lebron MD

## 2024-03-25 NOTE — PLAN OF CARE
Problem: BIRTH - VAGINAL/ SECTION  Goal: Fetal and maternal status remain reassuring during the birth process  Description: INTERVENTIONS:  - Monitor vital signs  - Monitor fetal heart rate  - Monitor uterine activity  - Monitor labor progression (vaginal delivery)  - DVT prophylaxis (C/S delivery)  - Surgical antibiotic prophylaxis (C/S delivery)  Outcome: Progressing     Problem: PAIN - ADULT  Goal: Verbalizes/displays adequate comfort level or patient's stated pain goal  Description: INTERVENTIONS:  - Encourage pt to monitor pain and request assistance  - Assess pain using appropriate pain scale  - Administer analgesics based on type and severity of pain and evaluate response  - Implement non-pharmacological measures as appropriate and evaluate response  - Consider cultural and social influences on pain and pain management  - Manage/alleviate anxiety  - Utilize distraction and/or relaxation techniques  - Monitor for opioid side effects  - Notify MD/LIP if interventions unsuccessful or patient reports new pain  - Anticipate increased pain with activity and pre-medicate as appropriate  Outcome: Progressing     Problem: ANXIETY  Goal: Will report anxiety at manageable levels  Description: INTERVENTIONS:  - Administer medication as ordered  - Teach and rehearse alternative coping skills  - Provide emotional support with 1:1 interaction with staff  Outcome: Progressing     Problem: Patient/Family Goals  Goal: Patient/Family Long Term Goal  Description: Patient's Long Term Goal: to have adequate pain management     Interventions:  -   - See additional Care Plan goals for specific interventions  Outcome: Progressing  Goal: Patient/Family Short Term Goal  Description: Patient's Short Term Goal: to have an uncomplicated vaginal delivery    Interventions:   -   - See additional Care Plan goals for specific interventions  Outcome: Progressing     Problem: CARDIOVASCULAR - ADULT  Goal: Maintains optimal cardiac  output and hemodynamic stability  Description: INTERVENTIONS:  - Monitor vital signs, rhythm, and trends  - Monitor for bleeding, hypotension and signs of decreased cardiac output  - Evaluate effectiveness of vasoactive medications to optimize hemodynamic stability  - Monitor arterial and/or venous puncture sites for bleeding and/or hematoma  - Assess quality of pulses, skin color and temperature  - Assess for signs of decreased coronary artery perfusion - ex. Angina  - Evaluate fluid balance, assess for edema, trend weights  Outcome: Progressing  Goal: Absence of cardiac arrhythmias or at baseline  Description: INTERVENTIONS:  - Continuous cardiac monitoring, monitor vital signs, obtain 12 lead EKG if indicated  - Evaluate effectiveness of antiarrhythmic and heart rate control medications as ordered  - Initiate emergency measures for life threatening arrhythmias  - Monitor electrolytes and administer replacement therapy as ordered  Outcome: Progressing

## 2024-03-25 NOTE — PROGRESS NOTES
[facility]  Obstetrics and Gynecology    OB/GYN: Intrapartum Progress Note     SUBJECTIVE:  Patient is a 33 year old  female at 38w6d admitted for IOL. Patient reports doing well. Pain well controlled.     OBJECTIVE:  Vitals:    24 2307 24 2352 24 2357 24 0001   BP:       Pulse: 116 (!) 126 104    Resp:       Temp:    98.4 °F (36.9 °C)   TempSrc:    Oral   SpO2: 98% 99% 99%    Weight:       Height:           Physical Exam:  General: AAO. NAD.   Fundus + gravid.    FHT: minimal variability / 175 BPM / no accel / +variable decels - recurrent  [place]: q 2-4 min  SVE: 10/100/+1    ASSESSMENT/PLAN:  Patient is a 33 year old  female at 38w6d admitted for IOL.     Doing well   Continue routine intrapartum care  NRFHT, fetal tachycardia. Fetal station +1 and minimal descent with maternal pushing. Fetal position OP and confirmed with BSUS. D/w patient operative delivery versus PCS including risks, benefits and alternatives. D/w patient that operative delivery can not be offered at this time as fetal station is +1 and concern for minimal descent during maternal pushing. Patient also expressed concern about operative delivery and requested to discuss other options. Discussed possible fetal head manual rotation but concern that success might be limited and could result in further impacting the fetal head into the pelvis prior to CS. Recommendation made for PCS 2/2 NRFHT and fetal tachycardia. Discussed risk of infection, bleeding and injury. Patient verbalized understanding. All questions and concerns were addressed. Patient agreeable to proceed with PCS.   Continue IOL. S/p cytotec x 4 doses. S/p AROM. Continue pitocin per protocol.   GBS negative   Continue epidural per request   Tachycardia, asymptomatic. Labs reviewed, noted for elevated D Dimer and leukocytosis. Afebrile. Normotensive. CXR wnl. Recommend hospitalist consult. Recommended CT chest angio but patient in active labor and  unable to complete recommended study. Plan for CT chest angio postpartum.   gHTN. Continue BP monitoring. Continue to monitor for signs/symptoms of pre eclampsia.     Teresa Means MD   [facility] - OBGYN

## 2024-03-26 NOTE — PROGRESS NOTES
OB progress note    A/P: 33 year old  now POD#1 s/p  3/25 for non reassuring fetal status after IOL for gestational hypertension. Labor course complicated by sinus tachycardia.  Echocardiogram LVEF 50%. Incidental 3mm RML nodule, no risk factors     Gestational HTN without severe features   -no antihypertensives currently but had acute blood loss anemia  -suspect will climb again. Will start medication if 140/90 or higher.     Sinus tachycardia, Low normal EF  -Cardiology & hospitalist consultations appreciated  -Diuresis PRN  -Would have her obtain an echo in our office a few weeks post discharge (cardiologist)     Lung nodule  -no follow up needed per hospitalist     Acute blood loss anemia  -IV iron x 1 dose ordered 3/26/2024     BLE edema  -will give lasix 20 mg PO x 1 & Kdur 20 mEq PO x 1 dose    Afebrile, VSS  Pain controlled  UOP adequate overnight at 220 mL    Rh+/GBS neg   SCDs, ambulation encouraged  Disposition: inpatient     Subjective: Pain moderately controlled. Had BM x 2 after milk of mag. Was passing flatus as well. Lochia normal. Tolerating PO. Is voiding. Is ambulating. Is breastfeeding. No HA, vision changes, epigastric pain. Feet feel swollen to her.     Vitals:    24 1750 24 2100 24 2320 24 0727   BP: 121/81 115/78 119/85 116/86   BP Location:  Right arm Right arm Right arm   Pulse: 101 104 104 92   Resp: 16 16  16   Temp: 98 °F (36.7 °C) 97.5 °F (36.4 °C)  97.8 °F (36.6 °C)   TempSrc: Oral Oral  Oral   SpO2: 100% 100% 98%    Weight:       Height:         Temp:  [97.5 °F (36.4 °C)-98 °F (36.7 °C)] 97.8 °F (36.6 °C)  Pulse:  [] 92  Resp:  [16] 16  BP: (115-121)/(78-86) 116/86  SpO2:  [98 %-100 %] 98 %      iron sucrose  200 mg Intravenous Once    furosemide  20 mg Oral Once    potassium chloride  20 mEq Oral Once    acetaminophen  1,000 mg Oral Q6H    ibuprofen  600 mg Oral Q6H    docusate sodium  100 mg Oral BID@0600,1800       Exam:  Gen A&O,  NAD  CV RRR  Lungs CTAB  Abd soft, mildly distended, fundus firm under umbilicus  Incision: clean/dry/intact  Ext nontender, 1+ edema to bilateral ankles, not currently wearing SCDs    Recent Labs   Lab 03/26/24  0715   WBC 21.9*   HGB 9.5*   .0   NE 17.01*       Ami Ramirez MD

## 2024-03-26 NOTE — PROGRESS NOTES
Cardiology Progress Note        Judi Garnica Patient Status:  Inpatient    1990 MRN [id number]   [facility] 2SW-J Attending Teresa Means MD   Hosp Day # 3 PCP Lizeth Voss MD     Subjective:  No issues overnight.  Vitals stable.    Medications:   acetaminophen  1,000 mg Oral Q6H    ketorolac  30 mg Intravenous Q6H    ibuprofen  600 mg Oral Q6H    docusate sodium  100 mg Oral BID@0600,1800       Continuous Infusions:   lactated ringers      dextrose in lactated ringers           Allergies:  Allergies   Allergen Reactions    Amoxicillin HIVES    Other OTHER (SEE COMMENTS)    Seasonal OTHER (SEE COMMENTS)               Intake/Output:    Intake/Output Summary (Last 24 hours) at 3/26/2024 0841  Last data filed at 3/25/2024 1900  Gross per 24 hour   Intake --   Output 1070 ml   Net -1070 ml           Last 3 Weights   24 1915 158 lb (71.7 kg)   24 1515 158 lb (71.7 kg)   24 1351 158 lb (71.7 kg)   24 1228 158 lb (71.7 kg)   24 1552 158 lb (71.7 kg)   24 1605 153 lb (69.4 kg)            Physical Exam:    Temp:  [97.5 °F (36.4 °C)-98.3 °F (36.8 °C)] 97.8 °F (36.6 °C)  Pulse:  [] 92  Resp:  [16-18] 16  BP: (115-129)/(78-91) 116/86  SpO2:  [97 %-100 %] 98 %    General: No apparent distress  HEENT: No focal deficits.  Neck: supple. JVP normal  Cardiac: Regular rhythm, S1, S2 normal,  rub or gallop.  No murmur.  Lungs: CTA  Abdomen: Soft, non-tender.   Extremities: 1= edema  Neurologic: no focal deficits  Skin: Warm and dry.     Telemetry: sinus    EKG:      Echo:      Cardiac Cath:      Labs:  HEM:  Recent Labs   Lab 24  1221 24  0715   WBC 13.7* 19.2* 21.9*   HGB 11.6* 10.7* 9.5*   .0 218.0 229.0       Chem:  Recent Labs   Lab 24  1221 24    139   K 4.4 4.4    110   CO2 19.0* 22.0   BUN 5* 4*   CREATSERUM 0.79 0.73   CA 9.6 9.3   MG  --  1.4*   GLU 84 166*       Recent  Labs   Lab 03/23/24  1221 03/24/24 2007   ALT 17 13   AST 23 18   ALB 2.7* 2.1*       No results for input(s): \"TROP\", \"CK\" in the last 168 hours.    No results for input(s): \"PTP\", \"INR\" in the last 168 hours.              Impression:  Sinus tachycardia - Improved post delivery.  Suspect all is well.  S/p C section on 3/24 pm, healthy mom and baby.  Edema  Low normal EF on echo 3/25 am.          Plan:  CV status is stable.  Will follow peripherally.  Echo in 4 weeks and see me in the office afterwards.        Devon Irving MD  3/26/2024  8:41 AM  L2

## 2024-03-26 NOTE — PLAN OF CARE
Problem: CARDIOVASCULAR - ADULT  Goal: Maintains optimal cardiac output and hemodynamic stability  Description: INTERVENTIONS:  - Monitor vital signs, rhythm, and trends  - Monitor for bleeding, hypotension and signs of decreased cardiac output  - Evaluate effectiveness of vasoactive medications to optimize hemodynamic stability  - Monitor arterial and/or venous puncture sites for bleeding and/or hematoma  - Assess quality of pulses, skin color and temperature  - Assess for signs of decreased coronary artery perfusion - ex. Angina  - Evaluate fluid balance, assess for edema, trend weights  Outcome: Progressing  Goal: Absence of cardiac arrhythmias or at baseline  Description: INTERVENTIONS:  - Continuous cardiac monitoring, monitor vital signs, obtain 12 lead EKG if indicated  - Evaluate effectiveness of antiarrhythmic and heart rate control medications as ordered  - Initiate emergency measures for life threatening arrhythmias  - Monitor electrolytes and administer replacement therapy as ordered  Outcome: Progressing     Problem: GASTROINTESTINAL - ADULT  Goal: Minimal or absence of nausea and vomiting  Description: INTERVENTIONS:  - Maintain adequate hydration with IV or PO as ordered and tolerated  - Nasogastric tube to low intermittent suction as ordered  - Evaluate effectiveness of ordered antiemetic medications  - Provide nonpharmacologic comfort measures as appropriate  - Advance diet as tolerated, if ordered  - Obtain nutritional consult as needed  - Evaluate fluid balance  Outcome: Progressing  Goal: Maintains or returns to baseline bowel function  Description: INTERVENTIONS:  - Assess bowel function  - Maintain adequate hydration with IV or PO as ordered and tolerated  - Evaluate effectiveness of GI medications  - Encourage mobilization and activity  - Obtain nutritional consult as needed  - Establish a toileting routine/schedule  - Consider collaborating with pharmacy to review patient's medication  profile  Outcome: Progressing  Goal: Maintains adequate nutritional intake (undernourished)  Description: INTERVENTIONS:  - Monitor percentage of each meal consumed  - Identify factors contributing to decreased intake, treat as appropriate  - Assist with meals as needed  - Monitor I&O, WT and lab values  - Obtain nutritional consult as needed  - Optimize oral hygiene and moisture  - Encourage food from home; allow for food preferences  - Enhance eating environment  Outcome: Progressing  Goal: Achieves appropriate nutritional intake (bariatric)  Description: INTERVENTIONS:  - Monitor for over-consumption  - Identify factors contributing to increased intake, treat as appropriate  - Monitor I&O, WT and lab values  - Obtain nutritional consult as needed  - Evaluate psychosocial factors contributing to over-consumption  Outcome: Progressing     Problem: CARDIOVASCULAR - ADULT  Goal: Maintains optimal cardiac output and hemodynamic stability  Description: INTERVENTIONS:  - Monitor vital signs, rhythm, and trends  - Monitor for bleeding, hypotension and signs of decreased cardiac output  - Evaluate effectiveness of vasoactive medications to optimize hemodynamic stability  - Monitor arterial and/or venous puncture sites for bleeding and/or hematoma  - Assess quality of pulses, skin color and temperature  - Assess for signs of decreased coronary artery perfusion - ex. Angina  - Evaluate fluid balance, assess for edema, trend weights  Outcome: Progressing  Goal: Absence of cardiac arrhythmias or at baseline  Description: INTERVENTIONS:  - Continuous cardiac monitoring, monitor vital signs, obtain 12 lead EKG if indicated  - Evaluate effectiveness of antiarrhythmic and heart rate control medications as ordered  - Initiate emergency measures for life threatening arrhythmias  - Monitor electrolytes and administer replacement therapy as ordered  Outcome: Progressing

## 2024-03-26 NOTE — PLAN OF CARE
Problem: CARDIOVASCULAR - ADULT  Goal: Maintains optimal cardiac output and hemodynamic stability  Description: INTERVENTIONS:  - Monitor vital signs, rhythm, and trends  - Monitor for bleeding, hypotension and signs of decreased cardiac output  - Evaluate effectiveness of vasoactive medications to optimize hemodynamic stability  - Monitor arterial and/or venous puncture sites for bleeding and/or hematoma  - Assess quality of pulses, skin color and temperature  - Assess for signs of decreased coronary artery perfusion - ex. Angina  - Evaluate fluid balance, assess for edema, trend weights  Outcome: Progressing  Goal: Absence of cardiac arrhythmias or at baseline  Description: INTERVENTIONS:  - Continuous cardiac monitoring, monitor vital signs, obtain 12 lead EKG if indicated  - Evaluate effectiveness of antiarrhythmic and heart rate control medications as ordered  - Initiate emergency measures for life threatening arrhythmias  - Monitor electrolytes and administer replacement therapy as ordered  Outcome: Progressing     Problem: GASTROINTESTINAL - ADULT  Goal: Minimal or absence of nausea and vomiting  Description: INTERVENTIONS:  - Maintain adequate hydration with IV or PO as ordered and tolerated  - Nasogastric tube to low intermittent suction as ordered  - Evaluate effectiveness of ordered antiemetic medications  - Provide nonpharmacologic comfort measures as appropriate  - Advance diet as tolerated, if ordered  - Obtain nutritional consult as needed  - Evaluate fluid balance  Outcome: Progressing  Goal: Maintains or returns to baseline bowel function  Description: INTERVENTIONS:  - Assess bowel function  - Maintain adequate hydration with IV or PO as ordered and tolerated  - Evaluate effectiveness of GI medications  - Encourage mobilization and activity  - Obtain nutritional consult as needed  - Establish a toileting routine/schedule  - Consider collaborating with pharmacy to review patient's medication  profile  Outcome: Progressing  Goal: Maintains adequate nutritional intake (undernourished)  Description: INTERVENTIONS:  - Monitor percentage of each meal consumed  - Identify factors contributing to decreased intake, treat as appropriate  - Assist with meals as needed  - Monitor I&O, WT and lab values  - Obtain nutritional consult as needed  - Optimize oral hygiene and moisture  - Encourage food from home; allow for food preferences  - Enhance eating environment  Outcome: Progressing  Goal: Achieves appropriate nutritional intake (bariatric)  Description: INTERVENTIONS:  - Monitor for over-consumption  - Identify factors contributing to increased intake, treat as appropriate  - Monitor I&O, WT and lab values  - Obtain nutritional consult as needed  - Evaluate psychosocial factors contributing to over-consumption  Outcome: Progressing     Problem: GENITOURINARY - ADULT  Goal: Absence of urinary retention  Description: INTERVENTIONS:  - Assess patient’s ability to void and empty bladder  - Monitor intake/output and perform bladder scan as needed  - Follow urinary retention protocol/standard of care  - Consider collaborating with pharmacy to review patient's medication profile  - Implement strategies to promote bladder emptying  Outcome: Progressing     Problem: POSTPARTUM  Goal: Long Term Goal:Experiences normal postpartum course  Description: INTERVENTIONS:  - Assess and monitor vital signs and lab values.  - Assess fundus and lochia.  - Provide ice/sitz baths for perineum discomfort.  - Monitor healing of incision/episiotomy/laceration, and assess for signs and symptoms of infection and hematoma.  - Assess bladder function and monitor for bladder distention.  - Provide/instruct/assist with pericare as needed.  - Provide VTE prophylaxis as needed.  - Monitor bowel function.  - Encourage ambulation and provide assistance as needed.  - Assess and monitor emotional status and provide social service/psych resources as  needed.  - Utilize standard precautions and use personal protective equipment as indicated. Ensure aseptic care of all intravenous lines and invasive tubes/drains.  - Obtain immunization and exposure to communicable diseases history.  Outcome: Progressing  Goal: Optimize infant feeding at the breast  Description: INTERVENTIONS:  - Initiate breast feeding within first hour after birth.   - Monitor effectiveness of current breast feeding efforts.  - Assess support systems available to mother/family.  - Identify cultural beliefs/practices regarding lactation, letdown techniques, maternal food preferences.  - Assess mother's knowledge and previous experience with breast feeding.  - Provide information as needed about early infant feeding cues (e.g., rooting, lip smacking, sucking fingers/hand) versus late cue of crying.  - Discuss/demonstrate breast feeding aids (e.g., infant sling, nursing footstool/pillows, and breast pumps).  - Encourage mother/other family members to express feelings/concerns, and actively listen.  - Educate father/SO about benefits of breast feeding and how to manage common lactation challenges.  - Recommend avoidance of specific medications or substances incompatible with breast feeding.  - Assess and monitor for signs of nipple pain/trauma.  - Instruct and provide assistance with proper latch.  - Review techniques for milk expression (breast pumping) and storage of breast milk. Provide pumping equipment/supplies, instructions and assistance, as needed.  - Encourage rooming-in and breast feeding on demand.  - Encourage skin-to-skin contact.  - Provide LC support as needed.  - Assess for and manage engorgement.  - Provide breast feeding education handouts and information on community breast feeding support.   Outcome: Progressing  Goal: Establishment of adequate milk supply with medication/procedure interruptions  Description: INTERVENTIONS:  - Review techniques for milk expression (breast  pumping).   - Provide pumping equipment/supplies, instructions, and assistance until it is safe to breastfeed infant.  Outcome: Progressing  Goal: Experiences normal breast weaning course  Description: INTERVENTIONS:  - Assess for and manage engorgement.  - Instruct on breast care.  - Provide comfort measures.  Outcome: Progressing  Goal: Appropriate maternal -  bonding  Description: INTERVENTIONS:  - Assess caregiver- interactions.  - Assess caregiver's emotional status and coping mechanisms.  - Encourage caregiver to participate in  daily care.  - Assess support systems available to mother/family.  - Provide /case management support as needed.  Outcome: Progressing

## 2024-03-26 NOTE — PROGRESS NOTES
[facility] daniel    Judi Garnica Patient Status:  Inpatient   Age/Gender 33 year old female MRN [id number]   Location [facility] Attending Teresa Means MD   Hosp Day # 3 PCP Lizeth Voss MD      Anesthesia Pain Progress Note    Anesthesia Technique:   Epidural Anesthesia     Pain Management Technique:  In addition to available oral supplemental and IV medications  Patient received neuraxial preservative free morphine for post procedural pain control.    Post Procedure Pain Quality:    Adequate    Pain Management Side Effects:  None     /86 (BP Location: Right arm)   Pulse 92   Temp 97.8 °F (36.6 °C) (Oral)   Resp 16   Ht 1.575 m (5' 2\")   Wt 71.7 kg (158 lb)   LMP 2023   SpO2 98%   Breastfeeding Yes   BMI 28.90 kg/m²       Injection Site: Injection site is intact on inspection     Complications from Pain Management or Anesthesia:   None    All patient questions were answered.  Follow up pain management is separate from intraoperative anesthetic needs.  Pain care is transitioned to primary service, with management by oral medications.    Thank you for asking us to participate in the care of your patient.    Callum Rios MD, 24, 9:04 AM      Callum Rios MD, 24, 9:04 AM

## 2024-03-27 NOTE — TELEPHONE ENCOUNTER
Called and spoke with pt.  She is being discharged today ..Wed.3/27/24.  Del. 3/25/24 PCS 38 weeks  Gestational hypertension/Sinus Tachycardia with cardiac consult  Made appointment. For BP Check for [place] office for     @ 11 am (had wanted [place])    Aware to keep BP log and take BP BIS and bring log to appointment.  On no BP medications   To call office with any BP's 140/100 per Dr. Peterson, or if having pre-eclampsia symptoms.(Reviewed all symptoms with her)..denies any at this time, c/o BLE.             Assessment/Plan:   Postoperative day 2, status post primary  section for non reassuring fetal heart tones.     Doing well.  [1] Routine postpartum/postoperative care.  Encouraged ambulation.  [2] Sinus tachycardia, low normal ejection fraction.  Hospitalist and cardiology notes appreciated.  Follow up cardiology and 4 week follow up cardiac echo  [3] GHtn- BP's stable without meds.  Home BP monitoring with parameters.  Follow up 3-4 days office for BP check.  Desires discharge home.

## 2024-03-27 NOTE — PROGRESS NOTES
SECTION POSTOPERATIVE DAY 2    Subjective:   Patient without complaints.  Ambulating without difficulty.  Pain well controlled.  Tolerating general diet.  No CP or SOB    Objective:   Patient Vitals for the past 24 hrs:   BP Temp Temp src Pulse Resp   24 0745 125/86 97.8 °F (36.6 °C) Oral 102 16   24 0500 125/88 -- -- 102 --   24 0300 (!) 127/95 -- -- 101 --   24 2300 127/87 -- -- 113 --   24 1945 (!) 139/98 98.3 °F (36.8 °C) Oral 107 18   24 1633 116/86 97.9 °F (36.6 °C) Oral 101 16   24 1320 119/85 -- -- 101 --     Tmax: Temp (24hrs), Av °F (36.7 °C), Min:97.8 °F (36.6 °C), Max:98.3 °F (36.8 °C)    Abdomen- Soft, non-tender, non-distended, uterus firm, appropriate size  Incision- Clean, dry and intact  Extremities- Non-tender, no Sanket's sign    Data Review:  Recent Labs   Lab 24  1221 24  0715   RBC 3.92 3.63* 3.18*   HGB 11.6* 10.7* 9.5*   HCT 36.1 33.7* 29.1*   MCV 92.1 92.8 91.5   MCH 29.6 29.5 29.9   MCHC 32.1 31.8 32.6   RDW 15.3 15.4 15.8   NEPRELIM 10.19* 15.78* 17.01*   WBC 13.7* 19.2* 21.9*   .0 218.0 229.0     Assessment/Plan:   Postoperative day 2, status post primary  section for non reassuring fetal heart tones.     Doing well.  [1] Routine postpartum/postoperative care.  Encouraged ambulation.  [2] Sinus tachycardia, low normal ejection fraction.  Hospitalist and cardiology notes appreciated.  Follow up cardiology and 4 week follow up cardiac echo  [3] GHtn- BP's stable without meds.  Home BP monitoring with parameters.  Follow up 3-4 days office for BP check.  Desires discharge home.    Lonnie Peterson MD  [facility]- Obstetrics & Gynecology

## 2024-03-27 NOTE — PLAN OF CARE
Problem: CARDIOVASCULAR - ADULT  Goal: Maintains optimal cardiac output and hemodynamic stability  Description: INTERVENTIONS:  - Monitor vital signs, rhythm, and trends  - Monitor for bleeding, hypotension and signs of decreased cardiac output  - Evaluate effectiveness of vasoactive medications to optimize hemodynamic stability  - Monitor arterial and/or venous puncture sites for bleeding and/or hematoma  - Assess quality of pulses, skin color and temperature  - Assess for signs of decreased coronary artery perfusion - ex. Angina  - Evaluate fluid balance, assess for edema, trend weights  3/27/2024 1234 by Cindi Traore RN  Outcome: Completed  3/27/2024 0957 by Cindi Traore RN  Outcome: Progressing  Goal: Absence of cardiac arrhythmias or at baseline  Description: INTERVENTIONS:  - Continuous cardiac monitoring, monitor vital signs, obtain 12 lead EKG if indicated  - Evaluate effectiveness of antiarrhythmic and heart rate control medications as ordered  - Initiate emergency measures for life threatening arrhythmias  - Monitor electrolytes and administer replacement therapy as ordered  3/27/2024 1234 by Cindi Traore RN  Outcome: Completed  3/27/2024 0957 by Cindi Traore RN  Outcome: Progressing     Problem: GASTROINTESTINAL - ADULT  Goal: Minimal or absence of nausea and vomiting  Description: INTERVENTIONS:  - Maintain adequate hydration with IV or PO as ordered and tolerated  - Nasogastric tube to low intermittent suction as ordered  - Evaluate effectiveness of ordered antiemetic medications  - Provide nonpharmacologic comfort measures as appropriate  - Advance diet as tolerated, if ordered  - Obtain nutritional consult as needed  - Evaluate fluid balance  Outcome: Completed  Goal: Maintains or returns to baseline bowel function  Description: INTERVENTIONS:  - Assess bowel function  - Maintain adequate hydration with IV or PO as ordered and tolerated  - Evaluate effectiveness of GI medications  -  Encourage mobilization and activity  - Obtain nutritional consult as needed  - Establish a toileting routine/schedule  - Consider collaborating with pharmacy to review patient's medication profile  Outcome: Completed  Goal: Maintains adequate nutritional intake (undernourished)  Description: INTERVENTIONS:  - Monitor percentage of each meal consumed  - Identify factors contributing to decreased intake, treat as appropriate  - Assist with meals as needed  - Monitor I&O, WT and lab values  - Obtain nutritional consult as needed  - Optimize oral hygiene and moisture  - Encourage food from home; allow for food preferences  - Enhance eating environment  Outcome: Completed  Goal: Achieves appropriate nutritional intake (bariatric)  Description: INTERVENTIONS:  - Monitor for over-consumption  - Identify factors contributing to increased intake, treat as appropriate  - Monitor I&O, WT and lab values  - Obtain nutritional consult as needed  - Evaluate psychosocial factors contributing to over-consumption  Outcome: Completed     Problem: GENITOURINARY - ADULT  Goal: Absence of urinary retention  Description: INTERVENTIONS:  - Assess patient’s ability to void and empty bladder  - Monitor intake/output and perform bladder scan as needed  - Follow urinary retention protocol/standard of care  - Consider collaborating with pharmacy to review patient's medication profile  - Implement strategies to promote bladder emptying  Outcome: Completed     Problem: POSTPARTUM  Goal: Long Term Goal:Experiences normal postpartum course  Description: INTERVENTIONS:  - Assess and monitor vital signs and lab values.  - Assess fundus and lochia.  - Provide ice/sitz baths for perineum discomfort.  - Monitor healing of incision/episiotomy/laceration, and assess for signs and symptoms of infection and hematoma.  - Assess bladder function and monitor for bladder distention.  - Provide/instruct/assist with pericare as needed.  - Provide VTE prophylaxis  as needed.  - Monitor bowel function.  - Encourage ambulation and provide assistance as needed.  - Assess and monitor emotional status and provide social service/psych resources as needed.  - Utilize standard precautions and use personal protective equipment as indicated. Ensure aseptic care of all intravenous lines and invasive tubes/drains.  - Obtain immunization and exposure to communicable diseases history.  3/27/2024 1234 by Cindi Traore RN  Outcome: Completed  3/27/2024 0957 by Cindi Traore RN  Outcome: Progressing  Goal: Optimize infant feeding at the breast  Description: INTERVENTIONS:  - Initiate breast feeding within first hour after birth.   - Monitor effectiveness of current breast feeding efforts.  - Assess support systems available to mother/family.  - Identify cultural beliefs/practices regarding lactation, letdown techniques, maternal food preferences.  - Assess mother's knowledge and previous experience with breast feeding.  - Provide information as needed about early infant feeding cues (e.g., rooting, lip smacking, sucking fingers/hand) versus late cue of crying.  - Discuss/demonstrate breast feeding aids (e.g., infant sling, nursing footstool/pillows, and breast pumps).  - Encourage mother/other family members to express feelings/concerns, and actively listen.  - Educate father/SO about benefits of breast feeding and how to manage common lactation challenges.  - Recommend avoidance of specific medications or substances incompatible with breast feeding.  - Assess and monitor for signs of nipple pain/trauma.  - Instruct and provide assistance with proper latch.  - Review techniques for milk expression (breast pumping) and storage of breast milk. Provide pumping equipment/supplies, instructions and assistance, as needed.  - Encourage rooming-in and breast feeding on demand.  - Encourage skin-to-skin contact.  - Provide LC support as needed.  - Assess for and manage engorgement.  - Provide  breast feeding education handouts and information on community breast feeding support.   3/27/2024 123 by Cindi Traore RN  Outcome: Completed  3/27/2024 0957 by Cindi Traore RN  Outcome: Progressing  Goal: Establishment of adequate milk supply with medication/procedure interruptions  Description: INTERVENTIONS:  - Review techniques for milk expression (breast pumping).   - Provide pumping equipment/supplies, instructions, and assistance until it is safe to breastfeed infant.  3/27/2024 1234 by Cindi Traore RN  Outcome: Completed  3/27/2024 0957 by Cindi Traore RN  Outcome: Progressing  Goal: Experiences normal breast weaning course  Description: INTERVENTIONS:  - Assess for and manage engorgement.  - Instruct on breast care.  - Provide comfort measures.  3/27/2024 1234 by Cindi Traore RN  Outcome: Completed  3/27/2024 0957 by Cindi Traore RN  Outcome: Progressing  Goal: Appropriate maternal -  bonding  Description: INTERVENTIONS:  - Assess caregiver- interactions.  - Assess caregiver's emotional status and coping mechanisms.  - Encourage caregiver to participate in  daily care.  - Assess support systems available to mother/family.  - Provide /case management support as needed.  3/27/2024 1234 by Cindi Traore RN  Outcome: Completed  3/27/2024 0957 by Cindi Traore RN  Outcome: Progressing

## 2024-03-27 NOTE — TELEPHONE ENCOUNTER
Admission Medication Reconciliation:    Information obtained from: patient's mother     Significant PMH/Disease States:   Past Medical History:   Diagnosis Date    Premature infant        Chief Complaint for this Admission:  diarrhea     Allergies:  Milk    Prior to Admission Medications:   Prior to Admission Medications   Prescriptions Last Dose Informant Patient Reported? Taking? infant formula,lf-iron-dha-ranjit [facility] ALIMENTUM) 2.75-5.54-10.2 gram/100 kcal powd   Yes Yes   lactobacillus rhamnosus gg 15 billion cell (CULTURELLE) 15 billion cell capsule 5/4/2017 at am  Yes Yes   Sig: Take 1 Cap by mouth daily. Facility-Administered Medications: None         Comments/Recommendations: History updated per patient's mother. She is very concerned that Tee be fed with Similac Alimentum only due to history of diarrhea with other formulas. Patient needs BP ck in 3 days. Please call to advise

## 2024-03-27 NOTE — DISCHARGE INSTRUCTIONS
[facility] DISCHARGE INSTRUCTIONS     CONGRATULATIONS on the birth of your baby!!  We are happy that we have been able to be of service to you during your pregnancy.  We trust that your experience in the hospital and with the birth of your child has been a pleasant one.  These instructions are for your reference concerning your care at home between now and your follow up visit.  Please call the office within the next few days to schedule your appointment.    REST  Since fatigue will probably be your biggest problem, you should try to rest in the morning and in the afternoon for at least 1½-2 hours if possible.  Getting up in the middle of the night to feed your baby interrupts your sleep cycle; two 4 hour periods of sleep do not equal one 8 hour period.  During the day while your baby is sleeping, do whatever you can to isolate yourself from the rest of the world so you can rest (i.e. turn off the phone ringer, put a sign on the front doorbell).    POSTPARTUM BLUES/DEPRESSION  Mood swings, crying spells, feeling overwhelmed and irritability are very common after childbirth.  Most women (50-80%) will experience some of these symptoms, which can last from a few days to 2 weeks.  If your symptoms last more than 2 weeks and/or include any of the following, you may have postpartum depression:  -feelings of sadness      -inability to care for yourself or your baby  -loss of interest in usual activities     -recurring thoughts of death/suicide  -difficulty sleeping or increased need for sleep   -feelings of worthlessness/hopelessness    Postpartum depression occurs in 8-12% of new mothers and is more common in mothers with a past history of depression.  If you have any of these symptoms and/or they persist for more than 2 weeks, please call our office/answering service or [facility] at [phone] or the Mom's Help Line at [phone].    BREAST FEEDING  It is important to  be in a relaxed atmosphere when you are nursing.  You will find that your breasts will engorge and become tender within the next few days.  Even though the suction your baby creates may not meet your expectations, remember that he/she is just learning.  If your breasts remain hard after nursing, you may use a breast pump to release the remaining milk.  This will stimulate your breasts to produce more milk when the need arises.  If your nipples become sore, you should use Lanisol (lanolin) cream and allow your nipples to air-dry after nursing.  It will take approximately 10-12 days for milk supply and demand to balance, so please be patient.  Breastfeeding requires ample rest, fluids (8-10 glasses of water/day) and adequate calories (approximately 2200cal/day).  Please continue your prenatal vitamins the entire time you are breastfeeding.  The lactation consultants at [facility] are available at [phone] to answer any questions or help with any problems.    BREAST CARE  We advise non-nursing mothers to continuously wear a tight-fitting bra and avoid any nipple/breast stimulation.  You should keep your bra as tight as you can tolerate, as this will help dry up your milk.  If your breasts are painful and feel engorged, you may use ice bags and Motrin for relief.  The engorgement and pain/discomfort will usually resolve within 1 week.  You may have some milky discharge from your breasts for several weeks.           EPISIOTOMY CARE  If you have an episiotomy/tear, the stitches used to close the incision will dissolve by themselves.  This process will take at least six weeks during which you should be careful with the area.  If peripads tend to chafe and irritate your skin, we recommend that you place a Tucks, a gauze filled with soothing medication, between your stitches and the peripad.  Tucks can by purchased at your local pharmacy.  Sitz baths may also aid in pain relief and healing; soak your bottom in a tub filled  with room temperature water for 20 minutes once or twice per day.  We prefer that you take showers rather than baths, and avoid swimming, for 4-6 weeks after delivery.    SEXUAL INTERCOURSE  Please avoid tampons, douching and intercourse for at least 4-6 weeks, or until you have stopped bleeding, whichever is longer.  When you do resume intercourse, realize that you can start ovulating/become pregnant as early as 2-3 weeks after delivery, even if you are breastfeeding.  Please ask one of your doctors or call the office if you have any questions or desire contraception for use before your six-week check-up.  You may notice more vaginal dryness than usual, especially if you are breastfeeding.  An over-the-counter lubricant such as Replens or Astroglide may provide some relief.  Pain/discomfort at the episiotomy site is not unusual and may last anywhere from 2 weeks to several months.  Massaging the area may help to soften the scar tissue and hasten the healing process.    MENSTRUATION  You may have a vaginal discharge/light bleeding for anywhere from 2-6 weeks after delivery.  The flow may taper off and then suddenly become heavier a few weeks later.  Your first real period may come any time from 4-12 weeks after delivery, but may not come at all if you are nursing.    EXERCISE  We recommend that you avoid strenuous exercise or housework for at least 3-6 weeks after delivery. Realize that your exercise tolerance will be reduced, and therefore you should start slowly and increase gradually.  Walking is acceptable, unless it causes too much discomfort or fatigue.  You should climb stairs the least amount possible for the first week or two after delivery.  When you do climb them, take them slowly.  Avoid making numerous trips when you can organize and make just one.  You should also avoid driving a car, if possible, for the first 1-2 weeks.  Kegels exercises are important to maintain good pelvic muscle tone and help  eliminate any urine leakage.  The exercises involve tightening the muscles around the vagina; try stopping urination in midstream or squeezing around your fingers to learn the correct muscle groups.  Do these exercises several times a day in repetitions of 10; try doing them every time you get to a stoplight or a commercial comes on the TV.    VITAMINS  We strongly encourage you to continue taking your prenatal vitamin until your six-week check-up or until you stop nursing, whichever is longer.  The iron in the vitamin will help to resolve any anemia from the blood loss of the delivery.  Adequate calcium intake is important for all women, but especially while nursing.  Your total calcium intake should be 1200mg/day.  Since the average women gets approximately 500mg in her diet, most women will require 500-700mg of supplemental calcium/day.  You can purchase a supplement such as Tums Ex, Citracal, Oscal or Viactin at your local pharmacy.    WE WISH YOU MUCH JACOB WITH YOUR NEW BABY AND MAY YOUR NIGHTS BE RESTFUL!!    SPECIAL INSTRUCTIONS FOLLOWING YOUR   As a result of the abdominal incision, recovery from a  Section is more involved than the recovery from a vaginal delivery.  Activities are more restricted, and you should not lift more than the weight of your baby for about 6 weeks unless absolutely necessary.  The most dangerous place we go is our roads.  If you cannot make a split second movement, then you should not be driving.  For some that might take 10 days, for others 4 or more weeks.  We recommend showers rather than baths for the first 2-3 weeks.  In the beginning, you will tire more easily and require additional rest.  You should continue taking prenatal vitamins until your 6 week checkup, or as long as you are nursing.    The incision requires about 6 weeks to heal completely.  You may notice a small amount of drainage for the first few days after your surgery.  Please call right away if  you notice an increasing amount of discharge, increasing redness/pain, or if there is an odor.    Vaginal bleeding should resemble a normal to light period for anywhere from 2-6 weeks.  You may experience a day or two of heavy bleeding with blood clots during this time.  If there is persistent heavy bleeding, please notify us.  Please refrain from sexual intercourse, douching or tampons for 6 weeks (even in the absence of an episiotomy) because of the risk of pelvic infection.      Please call our office in the next few days to arrange for both your 6 week postpartum visit and a 2 week postoperative visit.

## 2024-03-27 NOTE — PROGRESS NOTES
Discharge instructions reviewed with patient. All questions and concerns addressed. Patient verbalized understanding and agrees to plan of care. Patient stated ability to care for self and  at home. States intent to follow up with PEDS and OB as recommended.  securely in car seat for discharge. All belongings accounted for.

## 2024-03-27 NOTE — DISCHARGE SUMMARY
SECTION DISCHARGE SUMMARY     Admit date: 3/23/2024    Discharge date: 3/27/2024     Attending Physician: Teresa Means MD     Discharge Diagnoses: Term pregnancy, gestational hypertension, sinus tachycardia, non reassuring fetal heart tones, fetal tachycardia    Procedures: Primary low transverse  section    Complications: None    Hospital Course: Patient admitted with elevated blood pressures for induction of labor at 38 weeks of pregnancy with diagnosis of gestational hypertension.  Patient progressed to fully dilated but developed fetal tachycardia with non-reassuring fetal heart tones, so underwent primary low transverse  section.  Patient had persistent maternal sinus tachycardia and so had consultation with hospitalist and cardiology.  Echocardiogram showed low normal ejection fraction.  Tachycardia improved.  Blood pressures remained mild range and did not require treatment.  Patient discharged home post operative day 2 with instructions to follow up with cardiology for 4 week postpartum repeat echo and home blood pressure monitoring.  Routine post-operative care    Discharged Condition: Stable    Disposition: Home     Current Discharge Medication List        START taking these medications    Details   ibuprofen 600 MG Oral Tab Take 1 tablet (600 mg total) by mouth every 6 (six) hours as needed for Pain.  Qty: 30 tablet, Refills: 0           CONTINUE these medications which have NOT CHANGED    Details   prenatal vitamin with DHA 27-0.8-228 MG Oral Cap Take 1 capsule by mouth daily.           STOP taking these medications       PROMETHAZINE 25 MG Oral Tab        promethazine 25 MG Oral Tab        ondansetron (ZOFRAN) 4 mg tablet        valACYclovir 500 MG Oral Tab              Diet: General    Activity: Light activity, no straining or heavy lifting, pelvic rest,  no driving for 1-2 weeks    Follow-up:   [facility]  [address] [facility]  [address] [phone]  Call  As needed    Devon Irving MD  [address] [id number] [phone]    Call in 2 week(s)      Teresa eMans MD  [address]   [address] [address] [phone]    Follow up in 2 week(s)  for post operative visit

## 2024-03-27 NOTE — PLAN OF CARE
Problem: CARDIOVASCULAR - ADULT  Goal: Maintains optimal cardiac output and hemodynamic stability  Description: INTERVENTIONS:  - Monitor vital signs, rhythm, and trends  - Monitor for bleeding, hypotension and signs of decreased cardiac output  - Evaluate effectiveness of vasoactive medications to optimize hemodynamic stability  - Monitor arterial and/or venous puncture sites for bleeding and/or hematoma  - Assess quality of pulses, skin color and temperature  - Assess for signs of decreased coronary artery perfusion - ex. Angina  - Evaluate fluid balance, assess for edema, trend weights  Outcome: Progressing  Goal: Absence of cardiac arrhythmias or at baseline  Description: INTERVENTIONS:  - Continuous cardiac monitoring, monitor vital signs, obtain 12 lead EKG if indicated  - Evaluate effectiveness of antiarrhythmic and heart rate control medications as ordered  - Initiate emergency measures for life threatening arrhythmias  - Monitor electrolytes and administer replacement therapy as ordered  Outcome: Progressing     Problem: POSTPARTUM  Goal: Long Term Goal:Experiences normal postpartum course  Description: INTERVENTIONS:  - Assess and monitor vital signs and lab values.  - Assess fundus and lochia.  - Provide ice/sitz baths for perineum discomfort.  - Monitor healing of incision/episiotomy/laceration, and assess for signs and symptoms of infection and hematoma.  - Assess bladder function and monitor for bladder distention.  - Provide/instruct/assist with pericare as needed.  - Provide VTE prophylaxis as needed.  - Monitor bowel function.  - Encourage ambulation and provide assistance as needed.  - Assess and monitor emotional status and provide social service/psych resources as needed.  - Utilize standard precautions and use personal protective equipment as indicated. Ensure aseptic care of all intravenous lines and invasive tubes/drains.  - Obtain immunization and exposure to communicable diseases  history.  Outcome: Progressing  Goal: Optimize infant feeding at the breast  Description: INTERVENTIONS:  - Initiate breast feeding within first hour after birth.   - Monitor effectiveness of current breast feeding efforts.  - Assess support systems available to mother/family.  - Identify cultural beliefs/practices regarding lactation, letdown techniques, maternal food preferences.  - Assess mother's knowledge and previous experience with breast feeding.  - Provide information as needed about early infant feeding cues (e.g., rooting, lip smacking, sucking fingers/hand) versus late cue of crying.  - Discuss/demonstrate breast feeding aids (e.g., infant sling, nursing footstool/pillows, and breast pumps).  - Encourage mother/other family members to express feelings/concerns, and actively listen.  - Educate father/SO about benefits of breast feeding and how to manage common lactation challenges.  - Recommend avoidance of specific medications or substances incompatible with breast feeding.  - Assess and monitor for signs of nipple pain/trauma.  - Instruct and provide assistance with proper latch.  - Review techniques for milk expression (breast pumping) and storage of breast milk. Provide pumping equipment/supplies, instructions and assistance, as needed.  - Encourage rooming-in and breast feeding on demand.  - Encourage skin-to-skin contact.  - Provide LC support as needed.  - Assess for and manage engorgement.  - Provide breast feeding education handouts and information on community breast feeding support.   Outcome: Progressing  Goal: Establishment of adequate milk supply with medication/procedure interruptions  Description: INTERVENTIONS:  - Review techniques for milk expression (breast pumping).   - Provide pumping equipment/supplies, instructions, and assistance until it is safe to breastfeed infant.  Outcome: Progressing  Goal: Experiences normal breast weaning course  Description: INTERVENTIONS:  - Assess for  and manage engorgement.  - Instruct on breast care.  - Provide comfort measures.  Outcome: Progressing  Goal: Appropriate maternal -  bonding  Description: INTERVENTIONS:  - Assess caregiver- interactions.  - Assess caregiver's emotional status and coping mechanisms.  - Encourage caregiver to participate in  daily care.  - Assess support systems available to mother/family.  - Provide /case management support as needed.  Outcome: Progressing     Problem: GASTROINTESTINAL - ADULT  Goal: Minimal or absence of nausea and vomiting  Description: INTERVENTIONS:  - Maintain adequate hydration with IV or PO as ordered and tolerated  - Nasogastric tube to low intermittent suction as ordered  - Evaluate effectiveness of ordered antiemetic medications  - Provide nonpharmacologic comfort measures as appropriate  - Advance diet as tolerated, if ordered  - Obtain nutritional consult as needed  - Evaluate fluid balance  Outcome: Completed  Goal: Maintains or returns to baseline bowel function  Description: INTERVENTIONS:  - Assess bowel function  - Maintain adequate hydration with IV or PO as ordered and tolerated  - Evaluate effectiveness of GI medications  - Encourage mobilization and activity  - Obtain nutritional consult as needed  - Establish a toileting routine/schedule  - Consider collaborating with pharmacy to review patient's medication profile  Outcome: Completed  Goal: Maintains adequate nutritional intake (undernourished)  Description: INTERVENTIONS:  - Monitor percentage of each meal consumed  - Identify factors contributing to decreased intake, treat as appropriate  - Assist with meals as needed  - Monitor I&O, WT and lab values  - Obtain nutritional consult as needed  - Optimize oral hygiene and moisture  - Encourage food from home; allow for food preferences  - Enhance eating environment  Outcome: Completed  Goal: Achieves appropriate nutritional intake (bariatric)  Description:  INTERVENTIONS:  - Monitor for over-consumption  - Identify factors contributing to increased intake, treat as appropriate  - Monitor I&O, WT and lab values  - Obtain nutritional consult as needed  - Evaluate psychosocial factors contributing to over-consumption  Outcome: Completed     Problem: GENITOURINARY - ADULT  Goal: Absence of urinary retention  Description: INTERVENTIONS:  - Assess patient’s ability to void and empty bladder  - Monitor intake/output and perform bladder scan as needed  - Follow urinary retention protocol/standard of care  - Consider collaborating with pharmacy to review patient's medication profile  - Implement strategies to promote bladder emptying  Outcome: Completed

## 2024-03-28 NOTE — TELEPHONE ENCOUNTER
Spoke to patient.  Patient denies any concerns with her  incision.  Patient reports pain is semi-controlled with alternating between ibuprofen and Tylenol but her lower back is really bothering her- rated at a 6-7 on a 1-10 scale.  Patient desires prescription for gabapentin to help with comfort.    Discussed the area of raw skin seen in the photo attached to [employer] message.  Patient denies bleeding, discharge, itching or foul odor from wound.  Advised to keep the area clean and dry and to apply a barrier such as Aquaphor to protect the skin from moisture and allow healing.  Patient to call with worsening or persistent skin damage.

## 2024-03-29 NOTE — PROGRESS NOTES
Reviewed self and infant care w / mom, she verbalizes understanding of instructions reviewed. Encourage to follow up w/ MDs as directed and w/ questions/concerns. Getting anxious and depressed when baby not getting enough BM, mainly formula fdg now, ped appt later today, pumping reviewed if she wants to start that, bb and ppd reviewed w care and enc to join ct, cradle call letters sent on my chart for her review. Has bp appt already susan and sx of [facility] reviewed,

## 2024-04-01 NOTE — PATIENT INSTRUCTIONS
Please call the office if you have any blood pressures greater than 150/100, have a persistent headache that is not improved with rest/fluids/food/over the counter pain medication, vision changes, pain in your right upper side, or unusual/new swelling.

## 2024-04-01 NOTE — PROGRESS NOTES
Patient presents for BP check s/p  3/25/2024.  Dx: gestational hypertension  Medication: none  2 week incision check scheduled 2024  6 week PP appointment scheduled 2024.     Patient denies symptoms of preeclampsia.  Reports BP readings at home:  3/28  129/93 AM  134/80 PM  3/29  123/90 AM  133/92 PM  3/30  130/90 AM  139/96 PM  3/31  127/87 AM     BP in office today: 125/79    Consulted with Dr. Means regarding elevated diastolic readings.  Okay to continue to monitor and report any symptoms of preeclampsia.  Keep appointment as scheduled on 24.     Instructed patient to continue to monitor blood pressure twice daily.  Please call the office if you have any blood pressures greater than 150/100, have a persistent headache that is not improved with rest/fluids/food/over the counter pain medication, vision changes, pain in your right upper side, or unusual/new swelling.   Patient verbalized understanding.

## 2024-04-09 NOTE — PROGRESS NOTES
[facility]  Obstetrics and Gynecology   Postpartum Progress Note    Subjective:     Judi Garnica is a 33 year old  female who is s/p PCS on 3/25/2024. Her pregnancy was complicated by gestational hypertension, persistent maternal tachycardia in labor with low normal ejection fraction on echocardiogram, and non- reassuring fetal heart tones. She reports doing well. Baby is doing well and bottle feeding. The patient reports vaginal bleeding and cramps had slowed down but went walking a few days ago and had slight increased cramps the next day. Discussed activity The patient denies emotional concerns but has underlying anxiety. She sees a therapist and will plan to continue to do so.     Review of Systems:  General: denies fevers, chills, fatigue and malaise.   Respiratory: denies SOB, dyspnea, cough or wheezing  Cardiovascular: denies chest pain, palpitations, exercise intolerance   GI:denies abdominal pain, diarrhea, constipation  : denies dysuria, hematuria, increased urinary frequency. denies abnormal uterine bleeding or vaginal discharge.     Objective:     Vitals:    24 1008   BP: 122/78   Pulse: 87   Weight: 133 lb 8 oz (60.6 kg)   Height: 62\"     Body mass index is 24.42 kg/m².    General: AAO.NAD.   CV: normal peripheral perfusion   Lungs: no tachypnea, non-labored breathing   Abdominal exam: soft, nontender, nondistended, +BS  Incision:  clean, dry and intact. Well healed without signs of infection.   Pelvic exam: deferred   Ext: non-tender, no edema    Labs:       Assessment:     Judi Garnica is a 33 year old  female who presents for wound check     Patient Active Problem List   Diagnosis    Other allergy, other than to medicinal agents    History of cold sores    Anxiety    Hypertriglyceridemia    Supervision of normal first pregnancy, antepartum (HCC)    Cervical dysplasia    Nausea and vomiting during pregnancy (HCC)    Small for gestational  age (HCC)    Hyperemesis    Vomiting of pregnancy (HCC)    Gestational hypertension, third trimester (HCC)    Pregnancy (HCC)    Sinus tachycardia    S/P  section    Non-reassuring fetal heart tones complicating pregnancy, antepartum (HCC)    Fetal tachycardia affecting management of mother (HCC)    Acute blood loss anemia     Plan:     Postpartum wound exam   - s/p PCS   - doing well, no complaints   - no abnormal findings on physical exam   - continue postoperative restrictions including nothing per vagina and no heavy lifting     All of the findings and plan were discussed with the patient.  She notes understanding and agrees with the plan of care.  All questions were answered to the best of my ability at this time.    RTC in 4 weeks for postpartum visit sooner if needed     DUNIA Azul  [facility] - OBGYN             Note to patient and family   The 21st Century Cures Act makes medical notes available to patients in the interest of transparency.  However, please be advised that this is a medical document.  It is intended as [id number] communication.  It is written and medical language may contain abbreviations or verbiage that are technical and unfamiliar.  It may appear blunt or direct.  Medical documents are intended to carry relevant information, facts as evident, and the clinical opinion of the practitioner.

## 2024-04-10 NOTE — TELEPHONE ENCOUNTER
Pt requesting a letter stating she just had a  yesterday will be able to return to work sometime in fall.

## 2024-04-10 NOTE — TELEPHONE ENCOUNTER
Spoke to patient.  Informed patient that we can provide a return to work letter at her 6 week postpartum appointment on 24.  Offered a letter stating that she delivered by  on 3/25/24 and will be evaluated in office on 24 to determine her return to work date.  Patient verbalized understanding and agrees.  Letter sent.

## 2024-04-11 NOTE — TELEPHONE ENCOUNTER
From: Judi Garnica  To: Margaret Garcia  Sent: 2024 2:50 PM CDT  Subject: Return to work letter    Juan Miguel Robles,  I forgot to ask before I left today for a letter that states I had a  delivery and will be cleared to return to work in the fall. A mychart letter will work!     Thank you,  Judi

## 2024-05-04 NOTE — PROGRESS NOTES
[facility]  Obstetrics and Gynecology   Postpartum Progress Note    Subjective:     Judi Garnica is a 33 year old  female who is s/p PCS on 24. Her pregnancy was complicated by gHTN, NRFHT, fetal tachycardia. She reports doing well. Baby girl \"Alsea\" doing well and formula feeding. The patient reports vagina bleeding stopped. The patient denies emotional concerns.     Review of Systems:  General:  denies fevers, chills, fatigue and malaise.   Respiratory:  denies SOB, dyspnea, cough or wheezing  Cardiovascular:  denies chest pain, palpitations, exercise intolerance   GI: denies abdominal pain, diarrhea, constipation  : denies dysuria, hematuria, increased urinary frequency. denies abnormal uterine bleeding or vaginal discharge.       Objective:     Vitals:    24 1020   BP: 115/78   Pulse: 87   Weight: 131 lb 6.4 oz (59.6 kg)   Height: 62\"         Body mass index is 24.03 kg/m².    General: AAO.NAD.   CVS exam: normal peripheral perfusion  Chest: non-labored breathing, no tachypnea   Abdominal exam: soft, nontender, nondistended  Incision: clean, dry and intact. Well healed without signs of infection.   Pelvic exam:   VULVA: normal appearing vulva with no masses, tenderness or lesions  PERINEUM: intact, well healed, no signs of infection.   VAGINA: normal appearing vagina with normal color and discharge, no lesions  CERVIX: normal appearing cervix without discharge or lesions  UTERUS: uterus is normal size, shape, consistency and nontender  ADNEXA: normal adnexa in size, nontender and no masses  Ext: non-tender, no edema    Labs:         Assessment:     Judi Garnica is a 33 year old  female who presents for postpartum visit   Patient Active Problem List   Diagnosis    Other allergy, other than to medicinal agents    History of cold sores    Anxiety    Hypertriglyceridemia    Cervical dysplasia    Hyperemesis    Vomiting of pregnancy (HCC)    Sinus  tachycardia    S/P  section    Acute blood loss anemia         Plan:     Postpartum exam   - s/p PCS   - doing well, no complaints   - no abnormal findings on physical exam   - may return to normal activity   Contraception counseling   - discussion held with patient about family planning and contraception  - pt reports OCP, would like to restart  - new Rx w/ instructions provided   gHTN  - no meds  - follow up with PCP for BP check     All of the findings and plan were discussed with the patient.  She notes understanding and agrees with the plan of care.  All questions were answered to the best of my ability at this time.    RTC in 6 months for well woman exam or sooner if needed     Teresa Means MD   [facility] - OBGYN             Note to patient and family   The 21st Century Cures Act makes medical notes available to patients in the interest of transparency.  However, please be advised that this is a medical document.  It is intended as [id number] communication.  It is written and medical language may contain abbreviations or verbiage that are technical and unfamiliar.  It may appear blunt or direct.  Medical documents are intended to carry relevant information, facts as evident, and the clinical opinion of the practitioner.

## 2024-05-06 NOTE — PATIENT INSTRUCTIONS
Scar cream: bioCorneum plus SPF 30      Instructions for Birth Control Pill Use    The birth control pill works primarily by blocking ovulation (release of an egg).  If there is no egg to meet the sperm, pregnancy cannot occur.  The pill also works by making cervical mucous thick and unreceptive to sperm, slowing tubal function which has to move the egg down the tube to meet the sperm.    For women who follow these directions carefully, the pill is an effective reversible contraceptive currently available.    Starting birth control pills for the first time  1). Choose a backup method of birth control (such as condoms, diaphragm or foam) to use with your first pack of pills because the pill may not fully protect you from pregnancy during the first two weeks.  Keep this backup method handy and use it in case you:  Run out of pills  Forget to take your pill  Discontinue pill use  The use of condoms is ALWAYS encouraged to protect against sexually transmitted diseases, if applicable.   2). There are several ways to start taking your pills. Use one of the following approaches:  First day of period start: Start your first pack of pills on the day of your period. No back-up method is required  Sunday start: Start your first pack of pills on the first Sunday after your period begins.  This will result in your menses almost always beginning on a Tuesday or a Wednesday every 4 weeks.  You will need a backup method for 14 days.  Quick Start: Start immediately if pregnancy is excluded. You will need a back-up method for 14 days.  Quick start will cause your period to be irregular.  3). Take one pill a day until you finish the pack.   If you are using a 28-day pack, begin a new pack immediately. Skip no days between packages  If you are using a 21-day pack, stop taking pills for 1 week and then start your new pack   4). Try to associate taking your pill with something you do around the same time every day, like brushing your  teeth in the morning, eating a meal or going to bed.  Establishing a routine will make it easier for you to remember. The pill works best if you take it about the same time every day.    Continuing on the pills ~ What if……  1). If you have bleeding between periods  This is a very common side effect of birth control pills for the first 3 months.  Spotting (light bleeding between periods) can also occur if you miss a pill.  Call the doctor’s office for advice if bleeding is heavier than 1 pad an hour or the bleeding between periods last for more than 3 months  2). If you have nausea or vomiting  Nausea and vomiting may occur during the first few months of taking birth control pills.  Sometimes by changing the time of the day when you take the pill will help.  Try switching to dinner time or before bed.  If you had episodes of vomiting within 2 hours of taking your pill, please use a back-up plan for the rest of the cycle because your body may not absorbed the pill.  Contact your doctor’s office if you have persistent nausea and vomiting.  3). If you miss your period  It is not uncommon for your periods to become lighter while taking birth control pills or miss you period all together.  If you missed any pills in the pack prior to this occurring, you should take a home pregnancy test prior to starting a new pack.  4). If you forget your pills for a day or two follow the instructions below:  If you miss a pill, take the forgotten one (yesterday’s pill) as soon as you remember it and take today’s pill at the regular time.  Although you probably will not become pregnant, use a back-up method until your next period.  If you miss two pills in a row, take two pills as soon as you remember and two pills the next day.  You may have some spotting and nausea.  Please use a back-up method until your next period.  If you miss three or more pills in a row, do not take all 3 pills at once.  If you are in the third week of pills,  finish the pack and skip the inactive pills and start a new pack.  Start your backup method of birth control immediately.  You are at risk for becoming pregnant if you do not use a backup contraception.    Remember, missed pills may cause you to start spotting or bleeding for about 7 days.    5). If you experience breast soreness, mild headaches, mild edema (swelling)  These symptoms are usually mild and will improve after being on the pill for 3 or more months.  If any of these symptoms are severe or persist more than 3 months, you should contact our office.  6). If you experience mood swings or changes  Usually, after you adjust to the hormones, these symptoms will improve.  If at any time these symptoms are severe or persistent, you should contact our office.    7). If your doctor has you on continuous pills (No 7 day break after 21 days of active pills) in order to suppress your menses because of endometriosis or premenstrual syndrome, you will likely have break through bleeding.  If the spotting persists through more than 3 packs of pills, contact your doctor to confirm that you should stay on that brand of pills    8). If you need to take any other medications, including antibiotics, check with the pharmacist to see if there will be any interaction or if it will make your birth control pill less effective.      When to contact your provider  If you are having severe abdominal pain  Chest pain and shortness of breath  Severe Headaches  Blurred Vision or Vision Loss  Severe leg pain- calf or thigh    If you have additional questions or concerns, please call us at [phone]   ICU ICU ICU ICU team ICU team ICU team ICU team ICU team ICU team ICU team ICU team TEAM TEAM TEAM team team team team team team team team team team ICU team ICU team Intensivist ICU housestaff housestaff housestaff

## 2024-05-09 NOTE — TELEPHONE ENCOUNTER
From: Judi Garnica  To: Teresa Means  Sent: 5/9/2024 1:55 PM CDT  Subject: Diastasis Recti    Hi Dr. Means,  I had noticed a doming in my stomach above my belly button and did an at home test to feel for separation between my abs. Above my belly button, I have a 2-3 finger separation between them. Is there anything I can do to heal this? Is there anything I should be avoiding? Will they work on this during pelvic floor therapy?     Thank you,  Judi

## 2024-05-10 NOTE — TELEPHONE ENCOUNTER
Patient can try pelvic floor physical therapy.  However, if it is bothersome she can also like to have a consult with general surgery regarding further management.  If she elects pelvic floor physical therapy then please provide referral with instructions.  Thank you.

## 2024-05-30 NOTE — PROGRESS NOTES
Subjective:  33 year old    Chief Complaint   Patient presents with    Abdominal Pain     A lot of pulling and pinching in abdominal are since giving birth by emergency .     Patient complains of upper and lower abdominal pain stretching from one side to the other, slightly more on left side, ever since c section two months ago.  Continuous ever since surgery, not improving.  Pinching and pulling sensation in a horizontal line just below costal margin and also along incision in lower abdomen.  Has appt for PT for pelvic health.  No GI or  problems other than sometimes takes a while to start urine stream.  No complications with c section.  Infant doing well.  Also gaining weight since delivery on COCP    Review of Systems:  Pertinent items are noted in the HPI.    Objective:  /72   Pulse 76   Wt 131 lb 6 oz (59.6 kg)   LMP 2024     Physical Examination:  General appearance: Well dressed, well nourished in no apparent distress  Neurologic/Psychiatric: Alert and oriented to person, place and time, mood normal, affect appropriate  Abdomen: Soft, non-tender, non-distended, no masses, no hepatosplenomegaly, no hernias, no inguinal lymphadenopathy.  C section incision well healed with mild keloid formation.  Narrow rectus diastasis of upper abdomen above umbilicus, width 2-3 cm.      Assessment/Plan:  Abdominal pain, upper and lower, for past two months, not improving, 8 weeks postpartum.  Unclear etiology.  Unlikely but possibly related to small rectus diastasis of upper abdomen.  Benign exam.  If pain persists more than one month, recommend check CT of abdomen and pelvis.  Patient will call and OK to order without another office visit.  Patient already has appt for PT for pelvic health.  Can also discuss management of rectus diastasis at that appt.    Family planning advice- patient concerned about difficulty with weight loss/weight gain since delivery.  Reviewed contraceptive options.   Encouraged to consider IUD, condoms or sterilization.     Diagnoses and all orders for this visit:    Generalized abdominal pain    Rectus diastasis  -     OP REFERRAL TO [facility]    Family planning counseling

## 2024-06-03 NOTE — PATIENT INSTRUCTIONS
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. She subsequently completed her Doctor of Physical Therapy from [employer] in 2013. Mayela’s experience is primarily with orthopedics, but also has experience in pediatrics as well.     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 Mayela is continuing her passion for learning by pursuing training through the [facility] to effectively treat patients with pelvic floor related disorders. 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    Mayela thrives on treating patients with empathy & compassion to provide the individualized care that all patients need & deserve. Mayela aims to empower patients with the tools to feel in charge of their recovery, knowing they can advocate for themselves & achieve maximum success when they understand the treatments that work best for them.    When Mayela is not at work, she enjoys exercising with [employer] classes, scenic walks outside, baking, & traveling with her family.     Mayela is accepting new patients at the [facility], [place]. To schedule or change an appointment, call [phone]. To speak with Mayela, call [phone] or message on [employer].                      Access Code: [email or web address]  URL: [email or web address]  Date: 06/03/2024  Prepared by: Mayela Forde    Exercises  - Supine Transversus Abdominis Bracing - Hands on Stomach  - 1 x daily - 7 x weekly - 2-3 sec hold - 2-3  minutes

## 2024-06-03 NOTE — TELEPHONE ENCOUNTER
From: Judi Garnica  To: Lonnie Peterson  Sent: 5/31/2024 8:36 PM CDT  Subject: Referral     Hi Dr. Peterson,  I would like to go ahead and do the CT scan. I also wanted to let you know that I do not see the referral for the diastasis recti. Is this something that will be sent directly to the PT?     Thank you and I hope you have a great weekend.    Judi

## 2024-06-03 NOTE — PROGRESS NOTES
MUSCULOSKELETAL AND PELVIC FLOOR EVALUATION:     Diagnosis:   Postpartum examination following  delivery (HCC) (Z39.2)  Pelvic floor weakness (N81.89)      Referring Provider: Teresa Means  Date of Evaluation:    6/3/2024    Precautions:  Drug Allergy, +HPV at 2 yrs ago pap- pt reports currently cleared Next MD visit:   CT scan 2024  Date of Surgery: n/a     PATIENT SUMMARY   Judi Garnica is a 33 year old female who presents to therapy today with complaints of constant upper abdominal pain, pain & bulging above  scar & reports feeling of still looking a little pregnant, pulling & tightness to abdomen & scar, LBP, increased urinary frequency & urgency, pinching described as random/ sporadic with walking/ sitting/ voiding. Abdominal pain increased with walking/ activity.   Pt describes pain level: current 3/10, best 1/10, worst 5/10.  Pregnant Now: No; will plan to use condoms once regularly resuming intercourse  Obstetrical/Gynecological history: : 1  Para: 1 - baby girl Rj; thriving on formula 2/2 allergies  Complications in pregnancy: Pt reports difficult pregnancy with hyperemesis & weekly IV fusions as well as WALTER associated with vomiting.  Delivery method:  2024 after laboring for 36 hours & pushing for 1.5 hours  Complications in deliveries: tachycardia for pt & baby leading to emergency ; pt reports recovery has been very hard mentally & physically with a lot of pain, reports currently HR & BP normal  Menarche: 1x while on OCP - noted tampon sliding down, not currently on OCP    Occupation/Activities: ; pt reports trying some deep core workouts; low impact Peloton rides; breathing  PFDI-20: 75/300; Impairment= 25 %  PFDI 20    Scores   POPDI 6:    33.33   CRAD 8:    0   OWEN 6:    41.67   Summary:    Rod Lau describes prior level of function: Pt reports being very active pre-baby: worked out 4x/ week, did  jaimee. Pt goals include \"feel comfortable in my body again, to not feel discomfort doing everyday things, I want to be able to walk & stand up without discomfort, baby wear without discomfort.\"  Past medical history was reviewed with Judi. Significant findings include  has a past medical history of Acute blood loss anemia (03/26/2024), Acute sinusitis, unspecified, Acute tonsillitis, Acute tonsillitis (12/01/2011), Anxiety, Bloating (daily), Body piercing (ears), Chronic tonsillitis (08/23/2015), Constipation, Decorative tattoo (1), Diarrhea, unspecified, Easy bruising, Fatigue, Flatulence/gas pain/belching (daily), Food intolerance, Frequent UTI, Heartburn (2019), Heavy menses, High cholesterol (03/2021), History of cold sores (09/28/2017), History of mental disorder ([place]xitety), Hypertriglyceridemia (09/28/2017), Indigestion (2019), Irregular bowel habits, Itch of skin, Menses painful, Nausea, Otalgia, unspecified, Other allergy, other than to medicinal agents (06/29/2012), Pruritus, unspecified (05/02/2017), Scoliosis (and kyphoscoliosis), idiopathic, Seasonal allergies, Sinus tachycardia (03/24/2024), Skin blushing/flushing, Stool incontinence, Stress, Vomiting, Wears glasses, and Weight gain. Pt  has a past surgical history that includes tonsillectomy and colposcopy, cervix w/upper adjacent vagina; w/biopsy(s), cervix (Bilateral, 01/01/2020).    URINARY HABITS  Types of symptoms: rare stress incontinence, nocturia, and urinary hesitation with voiding  Events associated with the onset of urinary complaints: sneeze - 1x postpartum  Abdominal/Vaginal Pressure complaints: no  Urinary Frequency: increased, yesterday was 16x - did buy at home UTI test, did not use yet  Urine Stream: feels WNL  Leaking occurs: rare only 1x postpartum with sneeze  Nocturia: usually 1, sometimes 2  Fluid Intake: 80 oz water  Bladder irritants: 1 coffee in morning, 1-2 glasses unsweetened iced tea/ day  Empty bladder just in case:  no    BOWEL HABITS  Types of symptoms: other no concerns    Frequency of bowel movements: 1/ day  Stool consistency: [place] Stool Scale: 3-4  Do you strain with defecation: No   Laxative use: No  Needed stool softener during pregnancy    SEXUAL HEALTH STATUS  Sexual Penermon Status: active  Pain with initial and/or deep penetration: pain with first attempt; still some pain but is getting better, more with deep but also initial  Pain with pelvic exam: yes    ASSESSMENT  Judi presents to physical therapy evaluation with primary c/o constant upper abdominal pain, pain & bulging above  scar & reports feeling of still looking a little pregnant, pulling & tightness to abdomen & scar, LBP, pinching. The results of the objective tests and measures show impaired posture & breathing mechanics, decreased soft tissue/ scar mobility, impaired abdominal bracing/ muscle engagement with SARAH BETH at/ above/ below umbilicus, weakness to hip abductors; additional objective measures including internal pelvic muscle examination to be assessed at subsequent session, limited today due to time. Functional deficits include but are not limited to walking/ sitting/ voiding without pinching; walking/ physical activity without abdominal pain; increased urinary frequency; 1-2x nocturia; rare WALTER with sneeze; urinary urgency; urinary hesitancy; pain with intercourse & with pelvic exams. Signs and symptoms are consistent with diagnosis of Postpartum examination following  delivery (HCC) (Z39.2) Pelvic floor weakness (N81.89). Pt and PT discussed evaluation findings, pathology, POC and HEP. Pt voiced understanding and performs HEP correctly without reported pain. Skilled Pelvic Physical Therapy is medically necessary to address the above impairments and reach functional goals.    OBJECTIVE:   Limited 2/2 time, TBA further at subsequent session  Posture: residual pregnancy swayback/ anterior pelvis  External Palpation: mild  increased tension to T/L PSM in stance  Scars (location/surgery):  with mild hypertrophy increased to the R with mild to mod STRs varied throughout  Abdominal Wall Integrity: soft, increased bogginess through linea alba primarily at umbilicus with increased depth noted at umbilicus; good crosshand fascial glide with mild to mod restriction sup<>inf glide including of scar  Diastasis Recti: (finger width without/ with contraction)  Above Umbilicus: 2  Umbilicus: 3/ 2  Below Umbilicus: 1  +doming & mild coning with abdominal bracing  Strength (MMT): Hip Abduction: B 4-/5  Breathing Mechanics: increased chest rise with inhalation lacking sufficient 360 expansion especially posteriorly  Informed consent for internal pelvic evaluation given: Yes, deferred to subsequent visit    Today's Treatment and Response:   Patient education was provided on objective findings of external evaluation and expectations with treatment outcomes. Educated on pelvic anatomy and function with diagrams and pelvic model    Patient was instructed in and issued a HEP for: TA bracing emphasizing drawing in & zipping, standing posture correction avoiding coning & anterior pelvis  Access Code: [email or web address]  URL: [email or web address]  Date: 2024  Prepared by: Mayela Forde    Exercises  - Supine Transversus Abdominis Bracing - Hands on Stomach  - 1 x daily - 7 x weekly - 2-3 sec hold - 2-3 minutes    Charges: PT Eval Moderate Complexity, Neuro x 1      Total Timed Treatment: 15 min     Total Treatment Time: 50 min     Based on clinical rationale and outcome measures, this evaluation involved Moderate Complexity decision making due to 1-2 personal factors/comorbidities, 4+ body structures involved/activity limitations, and evolving symptoms including abdominal/ pelvic pain & LBP  PLAN OF CARE:    Goals: (to be met in 10 visits)  Patient will demonstrate sufficient 360 expansion with Diaphragmatic Breathing for properly  lengthening PFM for resolved urinary hesitancy.  Patient demonstrates increased tension at linea alba with decreased depth of palpation & reduced SARAH BETH at umbilicus from 3 to 2 finger width for improved ability to engage in ADLs.  Patient exhibits an increased in B hip Abduction strength from 4-/5 to at least 4+/5 to allow for increased tolerance to walking & exercise/ physical activity.  Patient will report reduced nocturia from 1-2x to 0-1x max for improved rest & function daytime.  Patient will report voids every 2-4 hours daytime for improved bowel/ bladder health & reduced urinary urgency.  Patient will report resolved pain with intercourse & pelvic exams.  Patient understands importance of performing HEP to prevent reoccurrence of symptoms.    Frequency / Duration: Patient will be seen for 1-2 x/week or a total of 10 visits over a 90 day period. Treatment will include: Manual Therapy, Neuromuscular Re-education, Self-Care Home Management, Therapeutic Activities, Therapeutic Exercise, Home Exercise Program instruction, and Modalities to include: Electrical stimulation (unattended) and Ultrasound     Education or treatment limitation: None  Rehab Potential:excellent    Patient/Family/Caregiver was advised of these findings, precautions, and treatment options and has agreed to actively participate in planning and for this course of care.    Thank you for your referral. Please co-sign or sign and return this letter via fax as soon as possible to [phone]. If you have any questions, please contact me at Dept: [phone]    Sincerely,  Electronically signed by therapist: Mayela Forde PT    Physician's certification required: Yes  I certify the need for these services furnished under this plan of treatment and while under my care.    X___________________________________________________ Date____________________    Certification From: 6/3/2024  To:9/1/2024

## 2024-06-14 NOTE — PROGRESS NOTES
Diagnosis:   Postpartum examination following  delivery (HCC) (Z39.2)  Pelvic floor weakness (N81.89)         Referring Provider: eTresa Means  Date of Evaluation:    6/3/2024    Precautions:  Drug Allergy, +HPV at 2 yrs ago pap- pt reports currently cleared Next MD visit:   none scheduled  Date of Surgery: n/a   Insurance Primary/Secondary: [employer] [place]PO / N/A     # Auth Visits: 40 visit limit, no auth            Subjective: Pt reports she had phone call with Dr. Peterson & stated per MD \"everything looked fine\" & \"hopefully will go away on its own, no surgery needed at this point, common after pregnancy\" in regards to the umbilical eventration. Pt reports she did end up having UTI - on meds that day /2 testing positive. Feeling much better, urinary freq still more overall (but back to postpartum baseline). Continuing with the pinching. Urgency better    Pain: discomfort vs pain above star: stinging pain      Objective: See Flowsheet for details  2024:  External Palpation: mild increased tension to T/L PSM/ fascia in stance, L>R  Scars (location/surgery):  with mild hypertrophy increased to the R with mild to mod STRs varied throughout. No keloid  Abdominal Wall Integrity: soft, increased bogginess through linea alba primarily at umbilicus with increased depth noted at umbilicus; mild to mod restriction sup<>inf glide including of scar      Assessment: Good tolerance to manual work including direct scar mobs. May add more scar mobs next visit including cross friction, skin rolling with use of emollient. Educated in potential of soreness from STM today. Educated in connection between SARAH BETH & soft tissue mobility especially thoracolumbar fascia/ paraspinals.      Goals: (to be met in 10 visits)  Patient will demonstrate sufficient 360 expansion with Diaphragmatic Breathing for properly lengthening PFM for resolved urinary hesitancy.  Patient demonstrates increased tension at linea alba with  decreased depth of palpation & reduced SARAH BETH at umbilicus from 3 to 2 finger width for improved ability to engage in ADLs.  Patient exhibits an increased in B hip Abduction strength from 4-/5 to at least 4+/5 to allow for increased tolerance to walking & exercise/ physical activity.  Patient will report reduced nocturia from 1-2x to 0-1x max for improved rest & function daytime.  Patient will report voids every 2-4 hours daytime for improved bowel/ bladder health & reduced urinary urgency.  Patient will report resolved pain with intercourse & pelvic exams.  Patient understands importance of performing HEP to prevent reoccurrence of symptoms.    Plan: Next visit: revisit option for internal pelvic muscle exam; assess response from manual treatment & tape technique, review pt's home workouts (Peloton) to ensure appropriate, review TA bracing form check. Pt was advised she can bring baby into treatment session if need be based on childcare availability.  Date: 6/14/2024  TX#: 2/10 Date:                 TX#: 3/ Date:                 TX#: 4/ Date:                 TX#: 5/ Date:   Tx#: 6/   Therex: 5'  Medical check in, education re: will review pt's home workouts at subsequent visit  Neuro Re-ed: 12'  Posture: avoiding coning  Posture with ADLs: transfers, sit to stand, lifting: breathing for proper pressure management: \"exhale with exertion\"  Log Rolling for avoiding additional strain to abdominals/ SARAH BETH  Manual Therapy: 30'  (All gloved)  Scar mobs: directly on scar, light; approximation, stretch/ glide, directly above  Standing thoracolumbar fascial release with SARAH BETH approximation  Supportive KT space correction tape: directly above scar (Pt educated in proper wear time for tape 2-3 days & no direct heat including to take off if any pain and/or irritation occurs.)       HEP: TA bracing emphasizing drawing in & zipping, standing posture correction avoiding coning & anterior pelvis  Access Code: [email or web address]  URL:  [email or web address]  Date: 06/03/2024  Prepared by: Mayela Forde    Exercises  - Supine Transversus Abdominis Bracing - Hands on Stomach  - 1 x daily - 7 x weekly - 2-3 sec hold - 2-3 minutes    6/14/2024: Log Rolling with bed mobility, breathing with ADLs \"exhale with exertion\"    Charges: Manual X 2, Neuro X 1       Total Timed Treatment: 47 min  Total Treatment Time: 47 min

## 2024-06-19 NOTE — PROGRESS NOTES
Please note for Pelvic Physical Therapy Evaluation dated 6/3/2024: date of delivery should be corrected/ listed as 3/25/2024, not 5/25/2024 as originally listed in Evaluation. Unable to Addend Evaluation as the note is cosigned by referring physician.

## 2024-06-21 NOTE — PATIENT INSTRUCTIONS
THE PELVIC BRACE    The pelvic brace is a combined pelvic floor and transverse abdominal low intensity muscle contraction. The transverse abdominal muscle is the deepest layer of the abdominal muscles and it aids in supporting the pelvic organs, spine and pelvis. Contraction of the transverse abdominal muscle creates a mild intensity tension of the lateral lower abdominal wall. Regular exercise of these muscles can build strength, awareness and retrain the muscles how to function together.     Correctly using and coordinating your pelvic floor and abdominal muscles can be the key to controlling your incontinence problem. The pelvic brace exercise creates an internal girdle to support your bladder and pelvic organs. First learn to perform the pelvic brace correctly, and then use the brace with physical activities that put excessive pressure on the pelvic organs and pelvic floor muscles.     POSTURAL MUSCLES  Many muscles in our body function all day long to keep us upright against gravity. We rarely notice that these muscles are even working as they perform at very low intensity. The transverse abdominal and pelvic floor muscles are included in this group of muscles along with muscles such as your back and neck muscles. When one of the muscles get weak it requires retraining to get it working again.     GUIDELINES TO PERFORMING THE PELVIC BRACE  The pelvic brace contraction creates a feeling of deep tension and drawing in of the lower abdomen and pelvic floor.   Your spine (low back) should be in neutral position.  Your therapist will help you find your neutral position.  Always perform the exercise as instructed by your therapist.   The correct contraction creates a hollowing of the lower belly to the bikini line.  Do not strain, bear down or bulge your abdomen as you do the exercise.    PERFORMING THE PELVIC BRACE  Place your fingers on your lower abdomen just inside your pelvic bones. You should feel the low level  contraction under your fingertips.  Contract the pelvic floor creating tension in the surface layer muscles that moves up to you abdomen and stops at the bikini line. This draws in and flattens the lower and side muscles of your abdomen.  Imagine drawing the vagina or scrotum into the body.   Tighten both the muscles as if you are trying to zip up a pair of pants to the bikini line.   Breathe while you maintain the low level contraction.  Remember, do not bulge your belly, strain or allow movement in the pelvis or back.  If you feel the quality of your exercise decline by starting to strain or bulge the abdominal muscles, stop your exercise session.  © 2004, [employer],          THE PELVIC BRACE WITH DAILY ACTIVITIES  Try the following daily activities in lying, sitting and standing positions.    The pelvic brace and cough  Breathe in, as you prepare to cough, bring your hand to your mouth and do the pelvic brace.   Hold the muscle and cough.  Now relax the brace.   If coughing causes leakage try this activity while clearing your throat.  Repeat ___ times    The pelvic brace and sit to stand  Breathe in as you prepare to stand   Do the pelvic brace.  Hold the muscles and stand up.   Be sure your therapist has shown you the proper technique.  Repeat ___ times.     The pelvic brace and lifting  Place a lightweight object of __lbs. on a table or the floor.  Place your feet shoulder width apart and keep your back straight.   Perform the pelvic brace, bend your knees to reach the object and lift.     Repeat ___ times.    Walking  Stand straight and upright with a neutral spine.  Do the pelvic brace while you walk during the day or practice walking in place for ____ minutes.    Other  activities:  ______________________________________________________________________    ______________________________________________________________________    ______________________________________________________________________      © 2004, [employer], [employer]                        COORDINATING THE PELVIC FLOOR AND BREATHING DIAPHRAGM      Learning to coordinate and contract the pelvic floor with exhalation is a practical technique for bladder control. It is useful to contract the pelvic floor during exhalation which occurs during coughing, sneezing and laughing.  For continence the pelvic floor muscles should be trained to contract when you are exhaling.      FUNCTIONAL BREATHING EXERCISE  Focus on the relationship between your breathing diaphragm and the pelvic floor/ lower abdomen muscles.  As you breathe in, let the pelvic floor relax.  As you exhale, tighten and contract the pelvic floor as if the muscles are helping the air leave your lungs.              Start by lying on your back or reclining in a chair in a relaxed position. Place one hand on your chest and the other on your belly.   Relax your jaw by placing your tongue on the roof of your mouth so that your teeth are not touching.   Take a breath in through your nose, letting the abdomen and ribs expand and rise while you keep your upper chest relaxed.  Contract the pelvic floor (closing of rectum/ vagina) and lower abdomen muscles for an audible 5 count as you exhale through your mouth.  Practice coordinating the muscles for 3-5 minutes.

## 2024-06-21 NOTE — PROGRESS NOTES
Diagnosis:   Postpartum examination following  delivery (HCC) (Z39.2)  Pelvic floor weakness (N81.89)         Referring Provider: Teresa Means  Date of Evaluation:    6/3/2024    Precautions:  Drug Allergy, +HPV at 2 yrs ago pap- pt reports currently cleared Next MD visit:   none scheduled  Date of Surgery: n/a   Insurance Primary/Secondary: [employer] [place] PPO / N/A     # Auth Visits: 40 visit limit, no auth            Subjective: Pt reports \"actually doing good\" - less tired, getting stronger; did have some soreness from the scar mobs; did like the tape but got itchy after 2 days & took it off- felt really supportive    Pain: n/a      Objective: See Flowsheet for details  2024:  Informed verbal consent for internal pelvic evaluation given: Yes. Ongoing consent confirmed throughout.    External Observation:   Voluntary contraction: present   Voluntary relaxation: present  Involuntary contraction: absent  Involuntary relaxation: absent    Mons pubis: WNL  Labia majora: WNL  Labia minora: WNL  Urethral meatus: WNL  Introitus: WNL  Perineal body: WNL    Sensory/Reflex:  Vestibule: normal bilaterally    Internal Examination  Pelvic Floor Muscle strength: (PERF= Power/Endurance/Reps/Fast) MMT: 2/5/4/NT  Accessory Muscle Use: gluteals    Tissue Laxity Test:  Anterior Wall: WNL  Posterior Wall: WNL  Apical: WNL    Internal Palpation: WNL except Bulbocavernosus L minimal restriction and pain  Ischiocavernosus L minimal restriction and pain  Pubococcygeus/Pubovaginalis L>R minimal restriction and pain  Iliococcygeus L pain  Obturator Internus L>R moderate restriction and pain  Min restricted Clitoral Prepuce mobility  Min TTP to B Ischiorectal Fossa      2024:  External Palpation: mild increased tension to T/L PSM/ fascia in stance, L>R  Scars (location/surgery):  with mild hypertrophy increased to the R with mild to mod STRs varied throughout. No keloid  Abdominal Wall Integrity: soft, increased  bogginess through linea alba primarily at umbilicus with increased depth noted at umbilicus; mild to mod restriction sup<>inf glide including of scar      Assessment: Pt provided informed consent to proceed with internal pelvic muscle exam with findings listed above. Pt challenged with performing pelvic brace: combined pelvic floor muscle contraction & TA bracing. Pt did better with performing each of these individually, though with improved form with cueing.      Goals: (to be met in 10 visits)  Patient will demonstrate sufficient 360 expansion with Diaphragmatic Breathing for properly lengthening PFM for resolved urinary hesitancy.  Patient demonstrates increased tension at linea alba with decreased depth of palpation & reduced SARAH BETH at umbilicus from 3 to 2 finger width for improved ability to engage in ADLs.  Patient exhibits an increased in B hip Abduction strength from 4-/5 to at least 4+/5 to allow for increased tolerance to walking & exercise/ physical activity.  Patient will report reduced nocturia from 1-2x to 0-1x max for improved rest & function daytime.  Patient will report voids every 2-4 hours daytime for improved bowel/ bladder health & reduced urinary urgency.  Patient will report resolved pain with intercourse & pelvic exams.  Patient understands importance of performing HEP to prevent reoccurrence of symptoms.    Plan: Next visit: assess for update following tape technique, review pelvic brace with internal palpation to ensure proper coordination, possible internal manual MFR to relevant musculature  Date: 6/14/2024  TX#: 2/10 Date: 6/21/2024  TX#: 3/10 Date:                 TX#: 4/ Date:                 TX#: 5/ Date:   Tx#: 6/   Therex: 5'  Medical check in, education re: will review pt's home workouts at subsequent visit  Neuro Re-ed: 12'  Posture: avoiding coning  Posture with ADLs: transfers, sit to stand, lifting: breathing for proper pressure management: \"exhale with exertion\"  Log Rolling for  avoiding additional strain to abdominals/ SARAH BETH  Manual Therapy: 30'  (All gloved)  Scar mobs: directly on scar, light; approximation, stretch/ glide, directly above  Standing thoracolumbar fascial release with SARAH BETH approximation  Supportive KT space correction tape: directly above scar (Pt educated in proper wear time for tape 2-3 days & no direct heat including to take off if any pain and/or irritation occurs.) Manual Therapy: 20'  Internal PFM exam: see above for details. Reviewed mechanism of exam & clinical benefit/ purpose of exam. Aftercare: potential of mild soreness.  Neuro Re-ed: 24'  TA bracing form check  Instruction in pelvic brace: with breathing coordination - HEP  -with internal cueing for manual feedback of form  KT tape \"X\" abdomen for abdominal muscle activation/ bracing with instruction on proper wear time  Coordinating PFM with breathing: HEP      HEP: TA bracing emphasizing drawing in & zipping, standing posture correction avoiding coning & anterior pelvis  Access Code: [email or web address]  URL: [email or web address]  Date: 06/03/2024  Prepared by: Mayela Forde    Exercises  - Supine Transversus Abdominis Bracing - Hands on Stomach  - 1 x daily - 7 x weekly - 2-3 sec hold - 2-3 minutes    6/14/2024: Log Rolling with bed mobility, breathing with ADLs \"exhale with exertion\"    6/21/2024: Coordinating PFM with Breathing, Pelvic Brace    Charges: Manual X 1, Neuro X 2       Total Timed Treatment: 44 min  Total Treatment Time: 44 min

## 2024-06-24 NOTE — PROGRESS NOTES
Diagnosis:   Postpartum examination following  delivery (HCC) (Z39.2)  Pelvic floor weakness (N81.89)         Referring Provider: Teresa Means  Date of Evaluation:    6/3/2024    Precautions:  Drug Allergy, +HPV at 2 yrs ago pap- pt reports currently cleared Next MD visit:   none scheduled  Date of Surgery: n/a   Insurance Primary/Secondary: [employer] [place] P[place] / N/A     # Auth Visits: 40 visit limit, no auth            Subjective: Pt reports having some body image issues. Challenge connecting pelvic floor & abs smoothly- clenching glutes- R glute discomfort. Really liked the tape- good reminder to engage my core. Hip discomfort with bringing legs up to tabletop - \"sticky\"    Pain: n/a      Objective: See Flowsheet for details  2024:  Informed verbal consent for internal pelvic evaluation given: Yes. Ongoing consent confirmed throughout.    External Observation:   Voluntary contraction: present   Voluntary relaxation: present  Involuntary contraction: absent  Involuntary relaxation: absent    Mons pubis: WNL  Labia majora: WNL  Labia minora: WNL  Urethral meatus: WNL  Introitus: WNL  Perineal body: WNL    Sensory/Reflex:  Vestibule: normal bilaterally    Internal Examination  Pelvic Floor Muscle strength: (PERF= Power/Endurance/Reps/Fast) MMT: 2/5/4/NT  Accessory Muscle Use: gluteals    Tissue Laxity Test:  Anterior Wall: WNL  Posterior Wall: WNL  Apical: WNL    Internal Palpation: WNL except Bulbocavernosus L minimal restriction and pain  Ischiocavernosus L minimal restriction and pain  Pubococcygeus/Pubovaginalis L>R minimal restriction and pain  Iliococcygeus L pain  Obturator Internus L>R moderate restriction and pain  Min restricted Clitoral Prepuce mobility  Min TTP to B Ischiorectal Fossa      2024:  External Palpation: mild increased tension to T/L PSM/ fascia in stance, L>R  Scars (location/surgery):  with mild hypertrophy increased to the R with mild to mod STRs varied throughout. No  keloid  Abdominal Wall Integrity: soft, increased bogginess through linea alba primarily at umbilicus with increased depth noted at umbilicus; mild to mod restriction sup<>inf glide including of scar      Assessment: Use of pilates ring in hooklying with presses aided with reducing pt's upper ab early activation with abdominal bracing: pt better able to initiate bracing with low abs vs starting at upper abs. Pt had better ability to coordinate abdominal bracing in sitting & then in quadruped vs in supine, & thus was instructed she may perform in these positions vs focusing in supine (hooklying). Pt reported addition of hip adductors via ball squeeze during PPT aided with reducing glute clenching compensation. Trial of hip IR & ER concurrently as well, with pt reporting feeling reduced glute compensation with hips in ER. Pt instructed in ways to know independent exercises are ideal with goal to focus on form: avoid coning or doming as seen in tabletop position not noted with supine hooklying marches.      Goals: (to be met in 10 visits)  Patient will demonstrate sufficient 360 expansion with Diaphragmatic Breathing for properly lengthening PFM for resolved urinary hesitancy.  Patient demonstrates increased tension at linea alba with decreased depth of palpation & reduced SARAH BETH at umbilicus from 3 to 2 finger width for improved ability to engage in ADLs.  Patient exhibits an increased in B hip Abduction strength from 4-/5 to at least 4+/5 to allow for increased tolerance to walking & exercise/ physical activity.  Patient will report reduced nocturia from 1-2x to 0-1x max for improved rest & function daytime.  Patient will report voids every 2-4 hours daytime for improved bowel/ bladder health & reduced urinary urgency.  Patient will report resolved pain with intercourse & pelvic exams.  Patient understands importance of performing HEP to prevent reoccurrence of symptoms.    Plan: Next visit: possible internal manual MFR  to relevant musculature; will anticipate progression of abdominal/ pelvic strengthening as indicated including off table (mat) as tolerated  Date: 6/14/2024  TX#: 2/10 Date: 6/21/2024  TX#: 3/10 Date: 6/24/2024     TX#: 4/10 Date:                 TX#: 5/ Date:   Tx#: 6/   Therex: 5'  Medical check in, education re: will review pt's home workouts at subsequent visit  Neuro Re-ed: 12'  Posture: avoiding coning  Posture with ADLs: transfers, sit to stand, lifting: breathing for proper pressure management: \"exhale with exertion\"  Log Rolling for avoiding additional strain to abdominals/ SARAH BETH  Manual Therapy: 30'  (All gloved)  Scar mobs: directly on scar, light; approximation, stretch/ glide, directly above  Standing thoracolumbar fascial release with SARAH BETH approximation  Supportive KT space correction tape: directly above scar (Pt educated in proper wear time for tape 2-3 days & no direct heat including to take off if any pain and/or irritation occurs.) Manual Therapy: 20'  Internal PFM exam: see above for details. Reviewed mechanism of exam & clinical benefit/ purpose of exam. Aftercare: potential of mild soreness.  Neuro Re-ed: 24'  TA bracing form check  Instruction in pelvic brace: with breathing coordination - HEP  -with internal cueing for manual feedback of form  KT tape \"X\" abdomen for abdominal muscle activation/ bracing with instruction on proper wear time  Coordinating PFM with breathing: HEP Neuro Re-ed: 27'  Pelvic Floor Isolation Exercises - HEP  -pt to ensure no pelvic pain or other \"negative\" symptoms noted with adding in PFM contractions  TA Bracing: Hooklying, Seated, Quadruped  Pilates ring (black) TA press hooklying with trial with SB in hooklying & in standing - HEP  Form check: tabletop (defer). Education for avoiding doming/ coning for self-exercise  Supine march with TA Bracing - HEP  For avoiding glute clenching: PPT in hooklying with low level: add hip Add tatum with yellow ball, add hip IR & ER  trial (better in ER or neutral for HEP)  Therex: 3'  Seated pelvic mobility/ tilts with SB - HEP  Manual Therapy: 15'  Sidelying crosshand fascial release R/L, OH reach with leg pull R/L  Thoracolumbar PSM/ fascial release with SARAH BETH approximation in standing     HEP: TA bracing emphasizing drawing in & zipping, standing posture correction avoiding coning & anterior pelvis  Access Code: [email or web address]  URL: [email or web address]  Date: 06/03/2024  Prepared by: Mayela Forde    Exercises  - Supine Transversus Abdominis Bracing - Hands on Stomach  - 1 x daily - 7 x weekly - 2-3 sec hold - 2-3 minutes    6/14/2024: Log Rolling with bed mobility, breathing with ADLs \"exhale with exertion\"    6/21/2024: Coordinating PFM with Breathing, Pelvic Brace    6/24/2024: Pelvic Floor Isolation Exercises  Access Code: [email or web address]  URL: [email or web address]  Date: 06/24/2024  Prepared by: Mayela Forde    Exercises  - Seated Transversus Abdominis Bracing  - 1 x daily - 7 x weekly - 3 sets - 10 reps  - Quadruped Transversus Abdominis Bracing  - 1 x daily - 7 x weekly - 3 sets - 10 reps  - Abdominal Press into Ball  - 1 x daily - 7 x weekly - 3 sets - 10 reps  - Seated Abdominal Press into Swiss Ball  - 1 x daily - 7 x weekly - 3 sets - 10 reps  - Posterior Pelvic Tilt with Adduction Using Pillow in Hooklying  - 1 x daily - 7 x weekly - 3 sets - 10 reps  - Pelvic Tilt on Swiss Ball  - 1 x daily - 7 x weekly - 3 sets - 10 reps  - Seated Lateral Pelvic Tilt on Swiss Ball  - 1 x daily - 7 x weekly - 3 sets - 10 reps  - Pelvic Circles on Swiss Ball  - 1 x daily - 7 x weekly - 3 sets - 10 reps  - Supine March with Posterior Pelvic Tilt  - 1 x daily - 7 x weekly - 3 sets - 10 reps    Charges: Manual X 1, Neuro X 2     Total Timed Treatment: 45 min  Total Treatment Time: 45 min

## 2024-06-24 NOTE — PATIENT INSTRUCTIONS
Judi, the below exercises should help guide you for progressing your home exercises. Feel free to mix up the sets & reps as these are only guidelines & you should focus more on quality vs quantity. Some of the pictures may look different than how you completed them in PT, so please refer to our session today for guidance on form if there is any discrepancy. Thank you! I will have a printed copy here for you at your next visit as a reference as well.          Access Code: [email or web address]  URL: [email or web address]  Date: 06/24/2024  Prepared by: Mayela Forde    Exercises  - Seated Transversus Abdominis Bracing  - 1 x daily - 7 x weekly - 3 sets - 10 reps  - Quadruped Transversus Abdominis Bracing  - 1 x daily - 7 x weekly - 3 sets - 10 reps  - Abdominal Press into Ball  - 1 x daily - 7 x weekly - 3 sets - 10 reps  - Seated Abdominal Press into Swiss Ball  - 1 x daily - 7 x weekly - 3 sets - 10 reps  - Posterior Pelvic Tilt with Adduction Using Pillow in Hooklying  - 1 x daily - 7 x weekly - 3 sets - 10 reps  - Pelvic Tilt on Swiss Ball  - 1 x daily - 7 x weekly - 3 sets - 10 reps  - Seated Lateral Pelvic Tilt on Swiss Ball  - 1 x daily - 7 x weekly - 3 sets - 10 reps  - Pelvic Circles on Swiss Ball  - 1 x daily - 7 x weekly - 3 sets - 10 reps  - Supine March with Posterior Pelvic Tilt  - 1 x daily - 7 x weekly - 3 sets - 10 reps            Pelvic Floor Isolation Exercises    PELVIC FLOOR EXERCISE GUIDELINES  Challenge your muscles to do more than they are used to doing.  The quality of the exercise is more important that the number you perform.  Avoid straining, holding your breath or using buttock or leg muscles while you exercise the pelvic floor muscles. Count out loud to avoid straining.  Relax your body and breathe during your exercises.  Coordinate your breathing with your pelvic floor contraction by blowing out or exhaling while you contract your pelvic floor muscles.   Concentrate on activating  both the surface and deep layers of the pelvic floor muscles with each exercise.    POSITION FOR THE EXERCISES   Start lying down with your knees bent and supported with pillows.  Once you've gained awareness and can feel the contractions you may perform the exercises either sitting or standing.    QUICK CONTRACTIONS  Repeat this exercise 20 times.  Do the exercise 2 times per day.  Rapidly contract your pelvic floor muscles and hold for 2 seconds relax for 2 seconds.  Try to do the contraction on breathing exhalation.    ENDURANCE CONTRACTIONS   Repeat this 20 times.  Do the exercise 2 times per day.  Pull your pelvic floor muscles up and in and hold for 3-4 seconds then relax for 4-6 seconds.    Count out loud while you are holding the contraction to make sure that you are breathing throughout the exercise and not straining.    © 2004, [employer]

## 2024-07-05 NOTE — PROGRESS NOTES
Diagnosis:   Postpartum examination following  delivery (HCC) (Z39.2)  Pelvic floor weakness (N81.89)         Referring Provider: Teresa Means  Date of Evaluation:    6/3/2024    Precautions:  Drug Allergy, +HPV at 2 yrs ago pap- pt reports currently cleared Next MD visit:   none scheduled  Date of Surgery: n/a   Insurance Primary/Secondary: [employer] [place] PPO / N/A     # Auth Visits: 40 visit limit, no auth            Subjective: Pt reports some scar pain \"dull ache\" & \"stinging\" & LBP. Last week hard time with PFM relaxation & glute relaxation. Hard to fall asleep. Felt really tight. Used the ice- did help, & did some stretching daily- in child's pose, stretching glutes: does not feel as tight as before but definitely still there. Hard to know if lashawn low abs first    Pain: 3/10      Objective: See Flowsheet for details  2024:  Scars (location/surgery):  with mild hypertrophy increased to the R with mild to mod STRs varied throughout with mild restriction scar fascial glide. No keloid      2024:  Informed verbal consent for internal pelvic evaluation given: Yes. Ongoing consent confirmed throughout.    External Observation:   Voluntary contraction: present   Voluntary relaxation: present  Involuntary contraction: absent  Involuntary relaxation: absent    Mons pubis: WNL  Labia majora: WNL  Labia minora: WNL  Urethral meatus: WNL  Introitus: WNL  Perineal body: WNL    Sensory/Reflex:  Vestibule: normal bilaterally    Internal Examination  Pelvic Floor Muscle strength: (PERF= Power/Endurance/Reps/Fast) MMT: 2///NT  Accessory Muscle Use: gluteals    Tissue Laxity Test:  Anterior Wall: WNL  Posterior Wall: WNL  Apical: WNL    Internal Palpation: WNL except Bulbocavernosus L minimal restriction and pain  Ischiocavernosus L minimal restriction and pain  Pubococcygeus/Pubovaginalis L>R minimal restriction and pain  Iliococcygeus L pain  Obturator Internus L>R moderate restriction and  pain  Min restricted Clitoral Prepuce mobility  Min TTP to B Ischiorectal Fossa      Assessment: Emphasis on manual scar work with instruction for pt to complete independently via HEP. Pt verbalized understanding of how to complete for home. Discussed connection between back pain, glute weakness & glute tightness. As tight muscles are often weak, & pt had weakness to hip abductors noted at IE, progressed with strengthening today including for pt to complete for home to best complement PT sessions. No pain with new strengthening provided. Cues with sidelying hip abduction to prevent compensatory mm activation. Also educated in connection between scar restriction & abdominal muscle activation. Pt reported some lingering LBP at end of visit.      Goals: (to be met in 10 visits)  Patient will demonstrate sufficient 360 expansion with Diaphragmatic Breathing for properly lengthening PFM for resolved urinary hesitancy.  Patient demonstrates increased tension at linea alba with decreased depth of palpation & reduced SARAH BETH at umbilicus from 3 to 2 finger width for improved ability to engage in ADLs.  Patient exhibits an increased in B hip Abduction strength from 4-/5 to at least 4+/5 to allow for increased tolerance to walking & exercise/ physical activity.  Patient will report reduced nocturia from 1-2x to 0-1x max for improved rest & function daytime.  Patient will report voids every 2-4 hours daytime for improved bowel/ bladder health & reduced urinary urgency.  Patient will report resolved pain with intercourse & pelvic exams.  Patient understands importance of performing HEP to prevent reoccurrence of symptoms.    Plan: Next visit: possible internal manual MFR to relevant musculature; will anticipate progression of abdominal/ pelvic strengthening as indicated including off table (mat) as tolerated. May add manual external work to lumbar spine as indicated.  Date: 6/14/2024  TX#: 2/10 Date: 6/21/2024  TX#: 3/10 Date:  6/24/2024     TX#: 4/10 Date: 7/5/2024  TX#: 5/10 Date:   Tx#: 6/   Therex: 5'  Medical check in, education re: will review pt's home workouts at subsequent visit  Neuro Re-ed: 12'  Posture: avoiding coning  Posture with ADLs: transfers, sit to stand, lifting: breathing for proper pressure management: \"exhale with exertion\"  Log Rolling for avoiding additional strain to abdominals/ SARAH BETH  Manual Therapy: 30'  (All gloved)  Scar mobs: directly on scar, light; approximation, stretch/ glide, directly above  Standing thoracolumbar fascial release with SARAH BETH approximation  Supportive KT space correction tape: directly above scar (Pt educated in proper wear time for tape 2-3 days & no direct heat including to take off if any pain and/or irritation occurs.) Manual Therapy: 20'  Internal PFM exam: see above for details. Reviewed mechanism of exam & clinical benefit/ purpose of exam. Aftercare: potential of mild soreness.  Neuro Re-ed: 24'  TA bracing form check  Instruction in pelvic brace: with breathing coordination - HEP  -with internal cueing for manual feedback of form  KT tape \"X\" abdomen for abdominal muscle activation/ bracing with instruction on proper wear time  Coordinating PFM with breathing: HEP Neuro Re-ed: 27'  Pelvic Floor Isolation Exercises - HEP  -pt to ensure no pelvic pain or other \"negative\" symptoms noted with adding in PFM contractions  TA Bracing: Hooklying, Seated, Quadruped  Pilates ring (black) TA press hooklying with trial with SB in hooklying & in standing - HEP  Form check: tabletop (defer). Education for avoiding doming/ coning for self-exercise  Supine march with TA Bracing - HEP  For avoiding glute clenching: PPT in hooklying with low level: add hip Add tatum with yellow ball, add hip IR & ER trial (better in ER or neutral for HEP)  Therex: 3'  Seated pelvic mobility/ tilts with SB - HEP  Manual Therapy: 15'  Sidelying crosshand fascial release R/L, OH reach with leg pull R/L  Thoracolumbar PSM/  fascial release with SARAH BETH approximation in standing Therex: 16'  Hip Abd Toni 10x10\" Blue TB - HEP  Clams x 10 R - HEP  Reverse clams x 10 R - HEP  S/L hip Abd x 10 R - HEP  Bridge progressions: regular bridge supine x 1; instruct in option to add hip Abd Toni with Blue TB & with hip Add Toni with ball squeeze - HEP  Education: avoiding chiro & PT concurrent to best assess for pt response to treatment  Education: connection between muscle tightness & weakness (glutes)  Neuro Re-ed: 8'  TA Bracing:   -visual cueing with [employer] video for \"zipper\"  -pt able to perform bracing in which ever positions she feels she can activate the best  Manual Therapy: 26'  (All gloved)  Scar mobs: directly on scar with emollient; including cross friction, approximation, stretch/ glide, connective tissue rolling, directly above & below scar with instruction for pt in how to properly complete for HEP  -concurrent with the above: scar healing times/ norms, goals for scar presentation  -recommend use of cocoa butter (Palmers), vitamin E oil, and/ or any lotion that is hypoallergenic/ fragrance-free    HEP: TA bracing emphasizing drawing in & zipping, standing posture correction avoiding coning & anterior pelvis  Access Code: [email or web address]  URL: [email or web address]  Date: 06/03/2024  Prepared by: Mayela Forde    Exercises  - Supine Transversus Abdominis Bracing - Hands on Stomach  - 1 x daily - 7 x weekly - 2-3 sec hold - 2-3 minutes    6/14/2024: Log Rolling with bed mobility, breathing with ADLs \"exhale with exertion\"    6/21/2024: Coordinating PFM with Breathing, Pelvic Brace    6/24/2024: Pelvic Floor Isolation Exercises  Access Code: [email or web address]  URL: [email or web address]  Date: 06/24/2024  Prepared by: Mayela Forde    Exercises  - Seated Transversus Abdominis Bracing  - 1 x daily - 7 x weekly - 3 sets - 10 reps  - Quadruped Transversus Abdominis Bracing  - 1 x daily - 7 x weekly - 3 sets - 10 reps  -  Abdominal Press into Ball  - 1 x daily - 7 x weekly - 3 sets - 10 reps  - Seated Abdominal Press into Swiss Ball  - 1 x daily - 7 x weekly - 3 sets - 10 reps  - Posterior Pelvic Tilt with Adduction Using Pillow in Hooklying  - 1 x daily - 7 x weekly - 3 sets - 10 reps  - Pelvic Tilt on Swiss Ball  - 1 x daily - 7 x weekly - 3 sets - 10 reps  - Seated Lateral Pelvic Tilt on Swiss Ball  - 1 x daily - 7 x weekly - 3 sets - 10 reps  - Pelvic Circles on Swiss Ball  - 1 x daily - 7 x weekly - 3 sets - 10 reps  - Supine March with Posterior Pelvic Tilt  - 1 x daily - 7 x weekly - 3 sets - 10 reps      Access Code: [email or web address]  URL: [email or web address]  Date: 07/05/2024  Prepared by: Mayela Forde    Exercises  - Hooklying Clamshell with Resistance  - 1 x daily - 3-4 x weekly - 1-2 sets - 10 reps - 10 sec hold  - Clamshell  - 1 x daily - 3-4 x weekly - 2-3 sets - 10 reps  - Sidelying Reverse Clamshell  - 1 x daily - 3-4 x weekly - 2-3 sets - 10 reps  - Sidelying Hip Abduction  - 1 x daily - 3-4 x weekly - 2-3 sets - 10 reps - 1-2 sec hold  - Supine Bridge  - 1 x daily - 3-4 x weekly - 2-3 sets - 10 reps - 5 sec hold  - Bridge with Hip Abduction and Resistance - Ground Touches  - 1 x daily - 3-4 x weekly - 2-3 sets - 10 reps - 5 sec hold  - Supine Bridge with Mini Swiss Ball Between Knees  - 1 x daily - 3-4 x weekly - 2-3 sets - 10 reps - 5 sec hold    Charges: Manual X 2, Neuro X 1, Therex X 1     Total Timed Treatment: 50 min  Total Treatment Time: 50 min

## 2024-07-12 NOTE — PROGRESS NOTES
Diagnosis:   Postpartum examination following  delivery (HCC) (Z39.2)  Pelvic floor weakness (N81.89)         Referring Provider: Teresa Means  Date of Evaluation:    6/3/2024    Precautions:  Drug Allergy, +HPV at 2 yrs ago pap- pt reports currently cleared Next MD visit:   none scheduled  Date of Surgery: n/a   Insurance Primary/Secondary: [employer] [place] PPO / N/A     # Auth Visits: 40 visit limit, no auth            Subjective: Pt reports hip & glute ex's \"super helpful\" - finds herself clenching sometimes but less often - \"big shift\"; little sore to the scar but fine, doing the daily 1 min massages shower shower, back pain is still there, almost worse laying down- if laying on my back - more conscious & aware of posture, has improved, but baby getting bigger so upper back soreness. Does not feel \"open\". 10 min body weight workout the other day- modified planks, push ups, squat hold. Pelvic tightness has gotten better- strengthening hips & glutes has helped pelvic floor to relax. +menses & tampon is staying in.    Pain: 3/10      Objective: See Flowsheet for details  2024:  TTP B PSIS  Hypomob lumbar & thoracic central PA joint mob      2024:  Scars (location/surgery):  with mild hypertrophy increased to the R with mild to mod STRs varied throughout with mild restriction scar fascial glide. No keloid      2024:  Informed verbal consent for internal pelvic evaluation given: Yes. Ongoing consent confirmed throughout.    External Observation:   Voluntary contraction: present   Voluntary relaxation: present  Involuntary contraction: absent  Involuntary relaxation: absent    Mons pubis: WNL  Labia majora: WNL  Labia minora: WNL  Urethral meatus: WNL  Introitus: WNL  Perineal body: WNL    Sensory/Reflex:  Vestibule: normal bilaterally    Internal Examination  Pelvic Floor Muscle strength: (PERF= Power/Endurance/Reps/Fast) MMT: 2//4/NT  Accessory Muscle Use: gluteals    Tissue Laxity  Test:  Anterior Wall: WNL  Posterior Wall: WNL  Apical: WNL    Internal Palpation: WNL except Bulbocavernosus L minimal restriction and pain  Ischiocavernosus L minimal restriction and pain  Pubococcygeus/Pubovaginalis L>R minimal restriction and pain  Iliococcygeus L pain  Obturator Internus L>R moderate restriction and pain  Min restricted Clitoral Prepuce mobility  Min TTP to B Ischiorectal Fossa      Assessment: Pt's feedback indicates positive response to treatment thus far. Pt able to maintain proper abdominal bracing with TA activation with BKFO preventing doming or coning. Pt reported some lingering LBP at end of visit which was advised is normal due to new treatment provided today.      Goals: (to be met in 10 visits)  Patient will demonstrate sufficient 360 expansion with Diaphragmatic Breathing for properly lengthening PFM for resolved urinary hesitancy.  Patient demonstrates increased tension at linea alba with decreased depth of palpation & reduced SARAH BETH at umbilicus from 3 to 2 finger width for improved ability to engage in ADLs.  Patient exhibits an increased in B hip Abduction strength from 4-/5 to at least 4+/5 to allow for increased tolerance to walking & exercise/ physical activity.  Patient will report reduced nocturia from 1-2x to 0-1x max for improved rest & function daytime.  Patient will report voids every 2-4 hours daytime for improved bowel/ bladder health & reduced urinary urgency.  Patient will report resolved pain with intercourse & pelvic exams.  Patient understands importance of performing HEP to prevent reoccurrence of symptoms.    Plan: Next visit: possible internal manual MFR to relevant musculature; will anticipate progression of abdominal/ pelvic & hip strengthening as indicated including off table (mat) as tolerated. Assess for update potentially trying barre classes (advise possible online first for shorter duration before full in-person class)  Date: 6/14/2024  TX#: 2/10 Date:  6/21/2024  TX#: 3/10 Date: 6/24/2024     TX#: 4/10 Date: 7/5/2024  TX#: 5/10 Date: 7/12/2024  TX#: 6/10   Therex: 5'  Medical check in, education re: will review pt's home workouts at subsequent visit  Neuro Re-ed: 12'  Posture: avoiding coning  Posture with ADLs: transfers, sit to stand, lifting: breathing for proper pressure management: \"exhale with exertion\"  Log Rolling for avoiding additional strain to abdominals/ SARAH BETH  Manual Therapy: 30'  (All gloved)  Scar mobs: directly on scar, light; approximation, stretch/ glide, directly above  Standing thoracolumbar fascial release with SARAH BETH approximation  Supportive KT space correction tape: directly above scar (Pt educated in proper wear time for tape 2-3 days & no direct heat including to take off if any pain and/or irritation occurs.) Manual Therapy: 20'  Internal PFM exam: see above for details. Reviewed mechanism of exam & clinical benefit/ purpose of exam. Aftercare: potential of mild soreness.  Neuro Re-ed: 24'  TA bracing form check  Instruction in pelvic brace: with breathing coordination - HEP  -with internal cueing for manual feedback of form  KT tape \"X\" abdomen for abdominal muscle activation/ bracing with instruction on proper wear time  Coordinating PFM with breathing: HEP Neuro Re-ed: 27'  Pelvic Floor Isolation Exercises - HEP  -pt to ensure no pelvic pain or other \"negative\" symptoms noted with adding in PFM contractions  TA Bracing: Hooklying, Seated, Quadruped  Pilates ring (black) TA press hooklying with trial with SB in hooklying & in standing - HEP  Form check: tabletop (defer). Education for avoiding doming/ coning for self-exercise  Supine march with TA Bracing - HEP  For avoiding glute clenching: PPT in hooklying with low level: add hip Add tatum with yellow ball, add hip IR & ER trial (better in ER or neutral for HEP)  Therex: 3'  Seated pelvic mobility/ tilts with SB - HEP  Manual Therapy: 15'  Sidelying crosshand fascial release R/L, OH reach  with leg pull R/L  Thoracolumbar PSM/ fascial release with SARAH BETH approximation in standing Therex: 16'  Hip Abd Toni 10x10\" Blue TB - HEP  Clams x 10 R - HEP  Reverse clams x 10 R - HEP  S/L hip Abd x 10 R - HEP  Bridge progressions: regular bridge supine x 1; instruct in option to add hip Abd Toni with Blue TB & with hip Add Toni with ball squeeze - HEP  Education: avoiding chiro & PT concurrent to best assess for pt response to treatment  Education: connection between muscle tightness & weakness (glutes)  Neuro Re-ed: 8'  TA Bracing:   -visual cueing with [employer] video for \"zipper\"  -pt able to perform bracing in which ever positions she feels she can activate the best  Manual Therapy: 26'  (All gloved)  Scar mobs: directly on scar with emollient; including cross friction, approximation, stretch/ glide, connective tissue rolling, directly above & below scar with instruction for pt in how to properly complete for HEP  -concurrent with the above: scar healing times/ norms, goals for scar presentation  -recommend use of cocoa butter (Palmers), vitamin E oil, and/ or any lotion that is hypoallergenic/ fragrance-free Therex: 20'  Exercise progressions/ modifications: based on pt symptoms: visual, tactile feedback to determine proper form (avoiding doming/ coning, using mirror)  Flat back stretch tableside 2 X 30\" - HEP  Hip Add stretch 1 X 30\" ea - HEP  Neuro Re-ed: 15'  SI belt options - may revisit (check [employer])  BKFO with TA X 5 ea - HEP  SB alt marches with TA bracing X 10 ea - HEP  Manual Therapy: 10'  Central PA thoracolumbar gr I-II prone over 1 pillow  STM B T/L PSM   HEP: TA bracing emphasizing drawing in & zipping, standing posture correction avoiding coning & anterior pelvis  Access Code: [email or web address]  URL: [email or web address]  Date: 06/03/2024  Prepared by: Mayela Forde    Exercises  - Supine Transversus Abdominis Bracing - Hands on Stomach  - 1 x daily - 7 x weekly - 2-3 sec hold - 2-3  minutes    6/14/2024: Log Rolling with bed mobility, breathing with ADLs \"exhale with exertion\"    6/21/2024: Coordinating PFM with Breathing, Pelvic Brace    6/24/2024: Pelvic Floor Isolation Exercises  Access Code: [email or web address]  URL: [email or web address]  Date: 06/24/2024  Prepared by: Mayela Forde    Exercises  - Seated Transversus Abdominis Bracing  - 1 x daily - 7 x weekly - 3 sets - 10 reps  - Quadruped Transversus Abdominis Bracing  - 1 x daily - 7 x weekly - 3 sets - 10 reps  - Abdominal Press into Ball  - 1 x daily - 7 x weekly - 3 sets - 10 reps  - Seated Abdominal Press into Swiss Ball  - 1 x daily - 7 x weekly - 3 sets - 10 reps  - Posterior Pelvic Tilt with Adduction Using Pillow in Hooklying  - 1 x daily - 7 x weekly - 3 sets - 10 reps  - Pelvic Tilt on Swiss Ball  - 1 x daily - 7 x weekly - 3 sets - 10 reps  - Seated Lateral Pelvic Tilt on Swiss Ball  - 1 x daily - 7 x weekly - 3 sets - 10 reps  - Pelvic Circles on Swiss Ball  - 1 x daily - 7 x weekly - 3 sets - 10 reps  - Supine March with Posterior Pelvic Tilt  - 1 x daily - 7 x weekly - 3 sets - 10 reps      Access Code: [email or web address]  URL: [email or web address]  Date: 07/05/2024  Prepared by: Mayela Forde    Exercises  - Hooklying Clamshell with Resistance  - 1 x daily - 3-4 x weekly - 1-2 sets - 10 reps - 10 sec hold  - Clamshell  - 1 x daily - 3-4 x weekly - 2-3 sets - 10 reps  - Sidelying Reverse Clamshell  - 1 x daily - 3-4 x weekly - 2-3 sets - 10 reps  - Sidelying Hip Abduction  - 1 x daily - 3-4 x weekly - 2-3 sets - 10 reps - 1-2 sec hold  - Supine Bridge  - 1 x daily - 3-4 x weekly - 2-3 sets - 10 reps - 5 sec hold  - Bridge with Hip Abduction and Resistance - Ground Touches  - 1 x daily - 3-4 x weekly - 2-3 sets - 10 reps - 5 sec hold  - Supine Bridge with Mini Swiss Ball Between Knees  - 1 x daily - 3-4 x weekly - 2-3 sets - 10 reps - 5 sec hold      Access Code: [email or web address]  URL:  [email or web address]  Date: 07/12/2024  Prepared by: Mayela Forde    Exercises  - Bent Knee Fallouts  - 1 x daily - 7 x weekly - 2 sets - 10 reps  - Swiss Ball March  - 1 x daily - 7 x weekly - 2 sets - 10 reps  - Standing 'L' Stretch at Counter  - 1 x daily - 7 x weekly - 3 sets - 30 sec hold    Charges: Manual X 1, Neuro X 1, Therex X 1     Total Timed Treatment: 45 min  Total Treatment Time: 45 min

## 2024-07-12 NOTE — PATIENT INSTRUCTIONS
Access Code: [email or web address]  URL: [email or web address]  Date: 07/12/2024  Prepared by: Mayela Forde    Exercises  - Bent Knee Fallouts  - 1 x daily - 7 x weekly - 2 sets - 10 reps  - Swiss Ball March  - 1 x daily - 7 x weekly - 2 sets - 10 reps  - Standing 'L' Stretch at Counter  - 1 x daily - 7 x weekly - 3 sets - 30 sec hold

## 2024-07-15 NOTE — TELEPHONE ENCOUNTER
Message left on patient's identified number to call back to discuss appointment on Thursday.    Patient returned call -   Had baby in March, initially had gestational hypertension and tachycardia after giving birth - states blood pressure had improved.  Starting in July - has had intermittent lightheadedness/nausea - no known triggers except sometimes it happens when changing positions.  Did clarify that it is lightheaded not \"spinning\" dizziness.    Informed patient that will keep appointment for Thursday however if symptoms worsen would need to go to ER for evaluation.  Patient verbalized understanding.

## 2024-07-18 NOTE — PROGRESS NOTES
Chief Complaint   Patient presents with    Lightheadedness     With constant nausea X July     Headache        HPI:    Patient ID: Judi Garnica is a 33 year old female.    Headache      4 month postpartum. She had emergency c section at 38wks for hypertension and tachycardia.  She did have htn latter on and monitor it and was normal. Home reading 124/86 was highest.   Sym start in July started with constant nausea and lightheaded. Mild dizzy. Anytime. Nausea is all time. She has vomiting x2 till now. No pass out. At night time no symptoms. She is not breast feeding. No pumping. No vaginal abdominal pain. No vaginal discharge or bleeding. No chest pain sob. No palpitation. She drink 32 oz a day. She 3meals a day and snack also.     Review of Systems   Neurological:  Positive for headaches.           Current Outpatient Medications   Medication Sig Dispense Refill    Multiple Vitamin (MULTI-VITAMIN) Oral Tab multivitamin tablet, [RxNorm: 0]      sertraline 50 MG Oral Tab        Allergies:  Allergies   Allergen Reactions    Amoxicillin HIVES    Other OTHER (SEE COMMENTS)    Seasonal OTHER (SEE COMMENTS)             HISTORY:  Past Medical History:    Acute blood loss anemia    Acute sinusitis, unspecified    Acute tonsillitis    Acute tonsillitis    Anxiety    Bloating    Body piercing    Chronic tonsillitis    Constipation    Decorative tattoo    Diarrhea, unspecified    Easy bruising    Fatigue    Flatulence/gas pain/belching    Food intolerance    Frequent UTI    Heartburn    Heavy menses    High cholesterol    History of cold sores    History of mental disorder    Hypertriglyceridemia    Indigestion    Irregular bowel habits    Itch of skin    Menses painful    Nausea    Otalgia, unspecified    Other allergy, other than to medicinal agents    Pruritus, unspecified    Scoliosis (and kyphoscoliosis), idiopathic    Seasonal allergies    Sinus tachycardia    Skin blushing/flushing    Stool incontinence     Stress    Vomiting    Wears glasses    Weight gain      Past Surgical History:   Procedure Laterality Date    Colposcopy, cervix w/upper adjacent vagina; w/biopsy(s), cervix Bilateral 01/01/2020    Tonsillectomy        Family History   Problem Relation Age of Onset    Arthritis Paternal Grandmother     Cancer Paternal Grandmother         lung, breast, and brain cancer    Breast Cancer Paternal Grandmother     Mental Disorder Paternal Grandmother     Diabetes Maternal Grandmother     Heart Disease Paternal Grandfather     Stroke Paternal Grandfather     Other (mental disability) Mother         suicide    Mental Disorder Mother     Other (mental disability) Maternal Grandfather     Heart Disorder Father     Diabetes Father     Heart Attack Father     Ovarian Cancer Maternal Aunt     Mental Disorder Sister       Social History:   Social History     Socioeconomic History    Marital status:    Occupational History    Occupation:    Tobacco Use    Smoking status: Never    Smokeless tobacco: Never   Vaping Use    Vaping status: Never Used   Substance and Sexual Activity    Alcohol use: Not Currently     Comment: Very rarely    Drug use: Never    Sexual activity: Yes     Partners: Male     Birth control/protection: OCP   Other Topics Concern     Service No    Blood Transfusions No    Caffeine Concern Yes     Comment: 1 cup coffee daily    Sleep Concern No    Stress Concern No    Weight Concern No    Special Diet No    Exercise Yes     Comment: 3 x a week     Seat Belt Yes    Self-Exams Yes     Social Determinants of Health     Financial Resource Strain: Low Risk  (3/23/2024)    Financial Resource Strain     Difficulty of Paying Living Expenses: Not hard at all     Med Affordability: No   Food Insecurity: No Food Insecurity (3/23/2024)    Food Insecurity     Food Insecurity: Never true   Transportation Needs: No Transportation Needs (3/23/2024)    Transportation Needs     Lack of  Transportation: No   Stress: No Stress Concern Present (3/23/2024)    Stress     Feeling of Stress : No   Housing Stability: Low Risk  (3/23/2024)    Housing Stability     Housing Instability: No        PHYSICAL EXAM:    /84   Pulse 70   Temp 97.2 °F (36.2 °C)   Resp 16   Wt 127 lb 9.6 oz (57.9 kg)   LMP 07/10/2024 (Exact Date)   SpO2 99%   BMI 23.34 kg/m²    Physical Exam  Constitutional:       General: She is not in acute distress.     Appearance: She is not ill-appearing.   HENT:      Right Ear: Tympanic membrane normal.      Left Ear: Tympanic membrane normal.      Nose: No congestion or rhinorrhea.      Mouth/Throat:      Pharynx: No oropharyngeal exudate or posterior oropharyngeal erythema.   Cardiovascular:      Rate and Rhythm: Normal rate and regular rhythm.      Pulses: Normal pulses.      Heart sounds: Normal heart sounds.   Pulmonary:      Effort: Pulmonary effort is normal.      Breath sounds: Normal breath sounds.   Abdominal:      Palpations: Abdomen is soft.      Tenderness: There is no abdominal tenderness.   Skin:     General: Skin is warm.      Capillary Refill: Capillary refill takes less than 2 seconds.   Neurological:      General: No focal deficit present.      Mental Status: She is alert.              ASSESSMENT/PLAN:   1. Nausea  4 month postpartum with nausea dizziness   BP stable at home and office too. Still recommend to check bp at home.  She was seen by cardio echo and EKG reviewed.  Labs ordered  Drink atleast 60 oz a day.   Eat 3-4 meals a day.     - CBC, Platelet, No Differential [E]; Future  - Iron And Tibc; Future  - Ferritin; Future  - Magnesium [E]; Future  - Comp Metabolic Panel (14); Future  - Vitamin B12 [E]; Future    2. Dizziness  As above.   - CBC, Platelet, No Differential [E]; Future  - Iron And Tibc; Future  - Ferritin; Future  - Magnesium [E]; Future  - Comp Metabolic Panel (14); Future  - Vitamin B12 [E]; Future             No follow-ups on file.

## 2024-07-19 NOTE — PROGRESS NOTES
Diagnosis:   Postpartum examination following  delivery (HCC) (Z39.2)  Pelvic floor weakness (N81.89)         Referring Provider: Teresa Means  Date of Evaluation:    6/3/2024    Precautions:  Drug Allergy, +HPV at 2 yrs ago pap- pt reports currently cleared Next MD visit:   none scheduled  Date of Surgery: n/a   Insurance Primary/Secondary: [employer] [place] / N/A     # Auth Visits: 40 visit limit, no auth            Subjective: Pt reports back pain is better, but having some pelvic floor pinching pain; does have another UTI. Low iron, high B12 - dizzy & nauseous. Starting iron supplement. On meds for UTI. Last one was . Meclizine for dizziness, Macrobid for UTI. Reduced pain with intercourse - improving, a lot better    Pain: 3/10      Objective: See Flowsheet for details  2024:  Diastasis Recti: (finger width without/ with contraction)  Above Umbilicus: 2 at IE, today 1-2 without bracing 0-1 with bracing  Umbilicus: 3/ 2 at IE, today 2-3 without bracing 1-2 with bracing  Below Umbilicus: 1 at IE, 1 without bracing 0-1 with bracing    Reduced depth of palpation/ increased tension at linea alba  +doming with abdominal bracing: pt still has come coordination deficit/ dyssynergy with abdominal bracing      2024:  TTP B PSIS  Hypomob lumbar & thoracic central PA joint mob      2024:  Scars (location/surgery):  with mild hypertrophy increased to the R with mild to mod STRs varied throughout with mild restriction scar fascial glide. No keloid      2024:  Informed verbal consent for internal pelvic evaluation given: Yes. Ongoing consent confirmed throughout.    External Observation:   Voluntary contraction: present   Voluntary relaxation: present  Involuntary contraction: absent  Involuntary relaxation: absent    Mons pubis: WNL  Labia majora: WNL  Labia minora: WNL  Urethral meatus: WNL  Introitus: WNL  Perineal body: WNL    Sensory/Reflex:  Vestibule: normal  bilaterally    Internal Examination  Pelvic Floor Muscle strength: (PERF= Power/Endurance/Reps/Fast) MMT: 2/5/4/NT  Accessory Muscle Use: gluteals    Tissue Laxity Test:  Anterior Wall: WNL  Posterior Wall: WNL  Apical: WNL    Internal Palpation: WNL except Bulbocavernosus L minimal restriction and pain  Ischiocavernosus L minimal restriction and pain  Pubococcygeus/Pubovaginalis L>R minimal restriction and pain  Iliococcygeus L pain  Obturator Internus L>R moderate restriction and pain  Min restricted Clitoral Prepuce mobility  Min TTP to B Ischiorectal Fossa      Assessment: Improving abdominal engagement evidenced by reduced SARAH BETH & improved tension at linea alba. Will continue to benefit from cueing & training for proper timing & coordination of abdominal muscle activation to ensure proper \"zipping\" effect with contraction initiated at low abs vs at upper abs, & without any doming. Deferred any internal work today in light of concurrent UTI but will benefit from revisiting this next visit if UTI symptoms are resolved. Relief particularly noted with open the book stretch. Mild reduced symptoms noted at end of visit.      Goals: (to be met in 10 visits)  Patient will demonstrate sufficient 360 expansion with Diaphragmatic Breathing for properly lengthening PFM for resolved urinary hesitancy.  Patient demonstrates increased tension at linea alba with decreased depth of palpation & reduced SARAH BETH at umbilicus from 3 to 2 finger width for improved ability to engage in ADLs.  Patient exhibits an increased in B hip Abduction strength from 4-/5 to at least 4+/5 to allow for increased tolerance to walking & exercise/ physical activity.  Patient will report reduced nocturia from 1-2x to 0-1x max for improved rest & function daytime.  Patient will report voids every 2-4 hours daytime for improved bowel/ bladder health & reduced urinary urgency.  Patient will report resolved pain with intercourse & pelvic exams.  Patient  understands importance of performing HEP to prevent reoccurrence of symptoms.    Plan: Next visit: possible internal manual MFR to relevant musculature (with resolved UTI symptoms); will anticipate progression of abdominal/ pelvic & hip strengthening as indicated including off table (mat) as tolerated. Assess for update potentially trying barre classes (advise possible online first for shorter duration before full in-person class)  Date: 6/14/2024  TX#: 2/10 Date: 6/21/2024  TX#: 3/10 Date: 6/24/2024     TX#: 4/10 Date: 7/5/2024  TX#: 5/10 Date: 7/12/2024  TX#: 6/10 Date: 7/19/2024  TX#: 7/10   Therex: 5'  Medical check in, education re: will review pt's home workouts at subsequent visit  Neuro Re-ed: 12'  Posture: avoiding coning  Posture with ADLs: transfers, sit to stand, lifting: breathing for proper pressure management: \"exhale with exertion\"  Log Rolling for avoiding additional strain to abdominals/ SARAH BETH  Manual Therapy: 30'  (All gloved)  Scar mobs: directly on scar, light; approximation, stretch/ glide, directly above  Standing thoracolumbar fascial release with SARAH BETH approximation  Supportive KT space correction tape: directly above scar (Pt educated in proper wear time for tape 2-3 days & no direct heat including to take off if any pain and/or irritation occurs.) Manual Therapy: 20'  Internal PFM exam: see above for details. Reviewed mechanism of exam & clinical benefit/ purpose of exam. Aftercare: potential of mild soreness.  Neuro Re-ed: 24'  TA bracing form check  Instruction in pelvic brace: with breathing coordination - HEP  -with internal cueing for manual feedback of form  KT tape \"X\" abdomen for abdominal muscle activation/ bracing with instruction on proper wear time  Coordinating PFM with breathing: HEP Neuro Re-ed: 27'  Pelvic Floor Isolation Exercises - HEP  -pt to ensure no pelvic pain or other \"negative\" symptoms noted with adding in PFM contractions  TA Bracing: Hooklying, Seated,  Quadruped  Pilates ring (black) TA press hooklying with trial with SB in hooklying & in standing - HEP  Form check: tabletop (defer). Education for avoiding doming/ coning for self-exercise  Supine march with TA Bracing - HEP  For avoiding glute clenching: PPT in hooklying with low level: add hip Add toni with yellow ball, add hip IR & ER trial (better in ER or neutral for HEP)  Therex: 3'  Seated pelvic mobility/ tilts with SB - HEP  Manual Therapy: 15'  Sidelying crosshand fascial release R/L, OH reach with leg pull R/L  Thoracolumbar PSM/ fascial release with SARAH BETH approximation in standing Therex: 16'  Hip Abd Toni 10x10\" Blue TB - HEP  Clams x 10 R - HEP  Reverse clams x 10 R - HEP  S/L hip Abd x 10 R - HEP  Bridge progressions: regular bridge supine x 1; instruct in option to add hip Abd Toni with Blue TB & with hip Add Toni with ball squeeze - HEP  Education: avoiding chiro & PT concurrent to best assess for pt response to treatment  Education: connection between muscle tightness & weakness (glutes)  Neuro Re-ed: 8'  TA Bracing:   -visual cueing with [employer] video for \"zipper\"  -pt able to perform bracing in which ever positions she feels she can activate the best  Manual Therapy: 26'  (All gloved)  Scar mobs: directly on scar with emollient; including cross friction, approximation, stretch/ glide, connective tissue rolling, directly above & below scar with instruction for pt in how to properly complete for HEP  -concurrent with the above: scar healing times/ norms, goals for scar presentation  -recommend use of cocoa butter ([place]mers), vitamin E oil, and/ or any lotion that is hypoallergenic/ fragrance-free Therex: 20'  Exercise progressions/ modifications: based on pt symptoms: visual, tactile feedback to determine proper form (avoiding doming/ coning, using mirror)  Flat back stretch tableside 2 X 30\" - HEP  Hip Add stretch 1 X 30\" ea - HEP  Neuro Re-ed: 15'  SI belt options - may revisit (check  [employer])  BKFO with TA X 5 ea - HEP  SB alt marches with TA bracing X 10 ea - HEP  Manual Therapy: 10'  Central PA thoracolumbar gr I-II prone over 1 pillow  STM B T/L PSM Self-Care: 15'  Stay hydrated, fiber to avoid constipation with iron  UTI prevention:  -vulvar hygiene  -effect of tampon, condoms, lubricant (ensure compatible with condom)  -effect of prolonged urine holding/ urge suppression (busy): goal is voids every 2-4 hours daytime  Therex: 20'  Pelvic stretches: complete if PFM activation/ irritation from UTI (can be expected)  Deep squat- with stool, without/ X 3' at wall HEP  Happy baby stretch - HEP  Child's pose - HEP  Cat-camel - HEP  Thread the needle - HEP  Open the book - HEP  ^brief trial of all of the above for form check  Neuro Re-ed: 15'  SARAH BETH check/ abdominal bracing check  Education: long-term plan with strengthening. Visual/ kinesthetic awareness. SARAH BETH: more important quality of movement: ensure proper form without any compensation   HEP: TA bracing emphasizing drawing in & zipping, standing posture correction avoiding coning & anterior pelvis  Access Code: [email or web address]  URL: [email or web address]  Date: 06/03/2024  Prepared by: Mayela Forde    Exercises  - Supine Transversus Abdominis Bracing - Hands on Stomach  - 1 x daily - 7 x weekly - 2-3 sec hold - 2-3 minutes    6/14/2024: Log Rolling with bed mobility, breathing with ADLs \"exhale with exertion\"    6/21/2024: Coordinating PFM with Breathing, Pelvic Brace    6/24/2024: Pelvic Floor Isolation Exercises  Access Code: [email or web address]  URL: [email or web address]  Date: 06/24/2024  Prepared by: Mayela Forde    Exercises  - Seated Transversus Abdominis Bracing  - 1 x daily - 7 x weekly - 3 sets - 10 reps  - Quadruped Transversus Abdominis Bracing  - 1 x daily - 7 x weekly - 3 sets - 10 reps  - Abdominal Press into Ball  - 1 x daily - 7 x weekly - 3 sets - 10 reps  - Seated Abdominal Press into Swiss Ball  - 1 x daily - 7  x weekly - 3 sets - 10 reps  - Posterior Pelvic Tilt with Adduction Using Pillow in Hooklying  - 1 x daily - 7 x weekly - 3 sets - 10 reps  - Pelvic Tilt on Swiss Ball  - 1 x daily - 7 x weekly - 3 sets - 10 reps  - Seated Lateral Pelvic Tilt on Swiss Ball  - 1 x daily - 7 x weekly - 3 sets - 10 reps  - Pelvic Circles on Swiss Ball  - 1 x daily - 7 x weekly - 3 sets - 10 reps  - Supine March with Posterior Pelvic Tilt  - 1 x daily - 7 x weekly - 3 sets - 10 reps      Access Code: [email or web address]  URL: [email or web address]  Date: 07/05/2024  Prepared by: Mayela Forde    Exercises  - Hooklying Clamshell with Resistance  - 1 x daily - 3-4 x weekly - 1-2 sets - 10 reps - 10 sec hold  - Clamshell  - 1 x daily - 3-4 x weekly - 2-3 sets - 10 reps  - Sidelying Reverse Clamshell  - 1 x daily - 3-4 x weekly - 2-3 sets - 10 reps  - Sidelying Hip Abduction  - 1 x daily - 3-4 x weekly - 2-3 sets - 10 reps - 1-2 sec hold  - Supine Bridge  - 1 x daily - 3-4 x weekly - 2-3 sets - 10 reps - 5 sec hold  - Bridge with Hip Abduction and Resistance - Ground Touches  - 1 x daily - 3-4 x weekly - 2-3 sets - 10 reps - 5 sec hold  - Supine Bridge with Mini Swiss Ball Between Knees  - 1 x daily - 3-4 x weekly - 2-3 sets - 10 reps - 5 sec hold      Access Code: [email or web address]  URL: [email or web address]  Date: 07/12/2024  Prepared by: Mayela Forde    Exercises  - Bent Knee Fallouts  - 1 x daily - 7 x weekly - 2 sets - 10 reps  - Swiss Ball March  - 1 x daily - 7 x weekly - 2 sets - 10 reps  - Standing 'L' Stretch at Counter  - 1 x daily - 7 x weekly - 3 sets - 30 sec hold      Access Code: [email or web address]  URL: [email or web address]  Date: 07/19/2024  Prepared by: Mayela Forde    Exercises  - Yoga Squat for Pelvic Floor Relaxation  - 1-2 x daily - 7 x weekly - 3 sets - 1-3 min hold  - Happy Baby with Pelvic Floor Lengthening  - 1-2 x daily - 7 x weekly  - Supine Butterfly Groin Stretch  - 1-2 x daily - 7 x  weekly  - Cat Cow  - 1-2 x daily - 7 x weekly - 2 sets - 10 reps - 5 sec hold  - Quadruped Thoracic Rotation - Reach Under  - 1-2 x daily - 7 x weekly - 5 sec hold  - Child's Pose with Thread the Needle  - 1-2 x daily - 7 x weekly - 5 sec hold  - Sidelying Open Book Thoracic Lumbar Rotation and Extension  - 1-2 x daily - 7 x weekly - 10-15 reps - 5 sec hold    Charges: Self-Care X 1, Neuro X 1, Therex X 1     Total Timed Treatment: 50 min  Total Treatment Time: 50 min

## 2024-07-19 NOTE — PATIENT INSTRUCTIONS
Access Code: [email or web address]  URL: [email or web address]  Date: 07/19/2024  Prepared by: Mayela Forde    Exercises  - Yoga Squat for Pelvic Floor Relaxation  - 1-2 x daily - 7 x weekly - 3 sets - 1-3 min hold  - Happy Baby with Pelvic Floor Lengthening  - 1-2 x daily - 7 x weekly  - Supine Butterfly Groin Stretch  - 1-2 x daily - 7 x weekly  - Cat Cow  - 1-2 x daily - 7 x weekly - 2 sets - 10 reps - 5 sec hold  - Quadruped Thoracic Rotation - Reach Under  - 1-2 x daily - 7 x weekly - 5 sec hold  - Child's Pose with Thread the Needle  - 1-2 x daily - 7 x weekly - 5 sec hold  - Sidelying Open Book Thoracic Lumbar Rotation and Extension  - 1-2 x daily - 7 x weekly - 10-15 reps - 5 sec hold

## 2024-07-25 NOTE — PATIENT INSTRUCTIONS
1. Rest. Drink plenty of fluids.  2. Bactrim DS as prescribed.  3. We will send urine tests to lab and call you with results in 2-3 days  4. Follow up with PMD in 4-5 days for reeval. Follow up sooner or go to the emergency department immediately if symptoms worsen, change, you develop fevers, flank pain, vomiting, pelvic pain, vaginal discharge/bleeding or if you have any concerns.

## 2024-07-25 NOTE — PROGRESS NOTES
CHIEF COMPLAINT:     Chief Complaint   Patient presents with    Urinary Symptoms     Start of July, Just finished antibotics for a UTI, still positive at home test, urgency frequency, pain  OTC none       HPI:   Judi Garnica is a 34 year old female who presents with symptoms of UTI. Complaining of urinary frequency, urgency, dysuria intermittently for the last 9-10 days. She did a telemedicine visit on 7/18 and was empirically treated with nitrofurantoin. (No urine culture performed). Pt notes yesterday was her last day of abx and symptoms have not improved.   Denies flank pain, fever, hematuria, nausea, or vomiting.  Denies abdominal pain, pelvic pain, vaginal discharge, bleeding, itching, or concerns for std's. Tolerates PO well at home. No n/v/d.  Denies any other aggravating or relieving factors at home. Denies any other treatment attempts prior to arrival.   Notes she did a home uti test in the past 24hrs which came back positive.    Current Outpatient Medications   Medication Sig Dispense Refill    sulfamethoxazole-trimethoprim DS (BACTRIM DS) 800-160 MG Oral Tab per tablet Take 1 tablet by mouth 2 (two) times daily for 7 days. 14 tablet 0    meclizine 25 MG Oral Tab Take 1 tablet (25 mg total) by mouth 3 (three) times daily as needed for Nausea or Dizziness. 30 tablet 0    Multiple Vitamin (MULTI-VITAMIN) Oral Tab multivitamin tablet, [RxNorm: 0]      sertraline 50 MG Oral Tab         Past Medical History:    Acute blood loss anemia    Acute sinusitis, unspecified    Acute tonsillitis    Acute tonsillitis    Anxiety    Bloating    Body piercing    Chronic tonsillitis    Constipation    Decorative tattoo    Diarrhea, unspecified    Easy bruising    Fatigue    Flatulence/gas pain/belching    Food intolerance    Frequent UTI    Heartburn    Heavy menses    High cholesterol    History of cold sores    History of mental disorder    Hypertriglyceridemia    Indigestion    Irregular bowel habits     Itch of skin    Menses painful    Nausea    Otalgia, unspecified    Other allergy, other than to medicinal agents    Pruritus, unspecified    Scoliosis (and kyphoscoliosis), idiopathic    Seasonal allergies    Sinus tachycardia    Skin blushing/flushing    Stool incontinence    Stress    Vomiting    Wears glasses    Weight gain      Social History:  Social History     Socioeconomic History    Marital status:    Occupational History    Occupation:    Tobacco Use    Smoking status: Never    Smokeless tobacco: Never   Vaping Use    Vaping status: Never Used   Substance and Sexual Activity    Alcohol use: Not Currently     Comment: Very rarely    Drug use: Never    Sexual activity: Yes     Partners: Male     Birth control/protection: OCP   Other Topics Concern     Service No    Blood Transfusions No    Caffeine Concern Yes     Comment: 1 cup coffee daily    Sleep Concern No    Stress Concern No    Weight Concern No    Special Diet No    Exercise Yes     Comment: 3 x a week     Seat Belt Yes    Self-Exams Yes     Social Determinants of Health     Financial Resource Strain: Low Risk  (3/23/2024)    Financial Resource Strain     Difficulty of Paying Living Expenses: Not hard at all     Med Affordability: No   Food Insecurity: No Food Insecurity (3/23/2024)    Food Insecurity     Food Insecurity: Never true   Transportation Needs: No Transportation Needs (3/23/2024)    Transportation Needs     Lack of Transportation: No   Stress: No Stress Concern Present (3/23/2024)    Stress     Feeling of Stress : No   Housing Stability: Low Risk  (3/23/2024)    Housing Stability     Housing Instability: No         REVIEW OF SYSTEMS:   GENERAL: Denies fever, chills, or body aches  SKIN: no rashes, no skin wounds or ulcers.  GI: Denies abdominal pain. No N/V/D.   : See HPI.  NEURO: no headaches.    EXAM:   /74   Pulse 89   Temp 98 °F (36.7 °C)   Resp 16   Ht 5' 2\" (1.575 m)   Wt 131 lb (59.4  kg)   LMP 07/10/2024 (Exact Date)   SpO2 98%   BMI 23.96 kg/m²   GENERAL: well developed, well nourished,in no apparent distress  CARDIO: RRR, no murmurs  LUNGS: clear to ausculation bilaterally, no wheezing or rhonchi  GI: No tenderness or guarding with palpation.No hepatosplenomegaly.  : No suprapubic tenderness. No bladder distention, or CVAT.    Recent Results (from the past 24 hour(s))   Urine Dip, auto without Micro    Collection Time: 07/25/24 10:44 AM   Result Value Ref Range    Glucose Urine Negative Negative mg/dL    Bilirubin Urine Negative Negative    Ketones, UA Negative Negative - Trace mg/dL    Spec Gravity 1.010 1.005 - 1.030    Blood Urine Negative Negative    PH Urine 7.0 5.0 - 8.0    Protein Urine Negative Negative - Trace mg/dL    Urobilinogen Urine 0.2 0.2 - 1.0 mg/dL    Nitrite Urine Negative Negative    Leukocyte Esterase Urine Small (A) Negative    APPEARANCE clear Clear    Color Urine yellow Yellow    Multistix Lot# [id number] Numeric    Multistix Expiration Date 1-11-25 Date   Urine Preg Test    Collection Time: 07/25/24 10:45 AM   Result Value Ref Range    Pregnancy Test, Urine negative     Control Line Present with a clear background (yes/no) yes Yes/No    Kit Lot # [id number] Numeric    Kit Expiration Date 1-11-25 Date         ASSESSMENT AND PLAN:       ICD-10-CM    1. UTI symptoms  R39.9 Urine Dip, auto without Micro     Urine Preg Test     Urine Culture, Routine     Urine Culture, Routine     sulfamethoxazole-trimethoprim DS (BACTRIM DS) 800-160 MG Oral Tab per tablet     Chlamydia/Gc Amplification [E]     Chlamydia/Gc Amplification [E]        Urine dip: + leuks  Urine culture sent to lab.  Urine hcg: negative  Urine gc/chlamydia sent to lab.    Discussed HPI and physical exam with pt. Pt has a reassuring physical exam consistent with a lower uti symptoms. Treatment options discussed with patient and explained in detail. Will start bactrim DS pending urine culture results. The risks,  benefits and potential side effects of the medications were reviewed. Alternatives were discussed. Monitoring parameters and expected course outlined. We discussed signs and symptoms that should prompt her to go to the emergency department immediately for evaluation including any fevers, flank pain, abdominal pain, vomiting, vaginal bleeding or if she has any concerns. Patient to call PCP or go to emergency department if symptoms fail to respond as outlined, or worsen in any way. The patient agreed with the plan.       Patient Instructions   1. Rest. Drink plenty of fluids.  2. Bactrim DS as prescribed.  3. We will send urine culture to lab and call you with results in 3-4 days. At that time we will discuss continuing, stopping, or changing the antibiotic based on the urine culture results.  4. Follow up with PMD in 4-5 days for reeval. Follow up sooner or go to the emergency department immediately if symptoms worsen, change, you develop fevers, flank pain, vomiting, pelvic pain, vaginal discharge/bleeding or if you have any concerns.

## 2024-07-26 NOTE — TELEPHONE ENCOUNTER
Urine culture in process  Patient seen in Walk in clinic yesterday - was started on antibiotic for potential UTI    sulfamethoxazole-trimethoprim DS (BACTRIM DS) 800-160 MG Oral Tab per tablet  Take 1 tablet by mouth 2 (two) times daily for 7 days. Dispense: 14 tablet, Refills: 0 ordered        07/25/2024     Patient woke up this morning with very bad headache  Patient took tylenol - it does help but does not resolve headache  Patient still having nausea/dizziness - patient saw Dr. Jensen for this on 7/18/24  No vomiting  No diarrhea  Patient at PT today and blood pressure was normal  Patient was having urinary frequency and pain - still having these symptoms  Patient also started iron and vitamin c supplement    Patient wanted know if this antibiotic could be causing the severe headache?  Did advise patient we will most likely wait until culture results prior to making any changes.

## 2024-07-26 NOTE — PROGRESS NOTES
Diagnosis:   Postpartum examination following  delivery (HCC) (Z39.2)  Pelvic floor weakness (N81.89)         Referring Provider: Teresa Means  Date of Evaluation:    6/3/2024    Precautions:  Drug Allergy, +HPV at 2 yrs ago pap- pt reports currently cleared Next MD visit:   none scheduled  Date of Surgery: n/a   Insurance Primary/Secondary: [employer] [place] PPO / N/A     # Auth Visits: 40 visit limit, no auth            Subjective: Pt reports UTI symptoms did not go away so went to walk-in clinic: did urine sample, waiting to have it come back- pt is on Bactrim right now & will be awaiting results to hear next steps. Pt is taking a probiotic with cranberry. +dizzy, migraine (currently has a headache, usually does not get headaches, behind L eye) - wait until get urine test back. No change in dizziness or nausea with continued taking iron. Some lightheadedness - been ongoing. No visual changes since starting med. No neck pain or stiffness since starting med. No chills, does not feel feverish. Stiffness on the L side of neck - not new, for about a week    Pain: 3/10      Objective: See Flowsheet for details  2024:  BP L brachial 108/78, HR 79; O2 99      2024:  Diastasis Recti: (finger width without/ with contraction)  Above Umbilicus: 2 at IE, today 1-2 without bracing 0-1 with bracing  Umbilicus: 3/ 2 at IE, today 2-3 without bracing 1-2 with bracing  Below Umbilicus: 1 at IE, 1 without bracing 0-1 with bracing    Reduced depth of palpation/ increased tension at linea alba  +doming with abdominal bracing: pt still has come coordination deficit/ dyssynergy with abdominal bracing      2024:  TTP B PSIS  Hypomob lumbar & thoracic central PA joint mob      2024:  Scars (location/surgery):  with mild hypertrophy increased to the R with mild to mod STRs varied throughout with mild restriction scar fascial glide. No keloid      2024:  Informed verbal consent for internal pelvic evaluation  given: Yes. Ongoing consent confirmed throughout.    External Observation:   Voluntary contraction: present   Voluntary relaxation: present  Involuntary contraction: absent  Involuntary relaxation: absent    Mons pubis: WNL  Labia majora: WNL  Labia minora: WNL  Urethral meatus: WNL  Introitus: WNL  Perineal body: WNL    Sensory/Reflex:  Vestibule: normal bilaterally    Internal Examination  Pelvic Floor Muscle strength: (PERF= Power/Endurance/Reps/Fast) MMT: 2/5/4/NT  Accessory Muscle Use: gluteals    Tissue Laxity Test:  Anterior Wall: WNL  Posterior Wall: WNL  Apical: WNL    Internal Palpation: WNL except Bulbocavernosus L minimal restriction and pain  Ischiocavernosus L minimal restriction and pain  Pubococcygeus/Pubovaginalis L>R minimal restriction and pain  Iliococcygeus L pain  Obturator Internus L>R moderate restriction and pain  Min restricted Clitoral Prepuce mobility  Min TTP to B Ischiorectal Fossa      Assessment: Continued to defer any internal work today in light of ongoing treatment for UTI. Vitals checked in visit to ensure WNL. However due to pt's subjective report pt was advised to contact PCP office to inform of symptoms & determine if any in the moment treatment is indicated. Pt spoke with nurse line in visit & by the time pt left PT session was awaiting additional call back from nurse line. Pt is aware of any red flag symptoms that necessitate emergent follow up. Discussed importance of pelvic stretches to alleviate any potential muscular irritation from UTI, as well as nervous system regulation techniques to mitigate physical symptoms, which were briefly trialed today for pt.      Goals: (to be met in 10 visits)  Patient will demonstrate sufficient 360 expansion with Diaphragmatic Breathing for properly lengthening PFM for resolved urinary hesitancy.  Patient demonstrates increased tension at linea alba with decreased depth of palpation & reduced SARAH BETH at umbilicus from 3 to 2 finger width for  improved ability to engage in ADLs.  Patient exhibits an increased in B hip Abduction strength from 4-/5 to at least 4+/5 to allow for increased tolerance to walking & exercise/ physical activity.  Patient will report reduced nocturia from 1-2x to 0-1x max for improved rest & function daytime.  Patient will report voids every 2-4 hours daytime for improved bowel/ bladder health & reduced urinary urgency.  Patient will report resolved pain with intercourse & pelvic exams.  Patient understands importance of performing HEP to prevent reoccurrence of symptoms.    Plan: Next visit: possible internal manual MFR to relevant musculature (with resolved UTI symptoms); will anticipate progression of abdominal/ pelvic & hip strengthening as indicated including off table (mat) as tolerated. Assess for update potentially trying barre classes (advise possible online first for shorter duration before full in-person class)  Date: 6/14/2024  TX#: 2/10 Date: 6/21/2024  TX#: 3/10 Date: 6/24/2024     TX#: 4/10 Date: 7/5/2024  TX#: 5/10 Date: 7/12/2024  TX#: 6/10 Date: 7/19/2024  TX#: 7/10 Date: 7/26/2024  TX#: 8/10   Therex: 5'  Medical check in, education re: will review pt's home workouts at subsequent visit  Neuro Re-ed: 12'  Posture: avoiding coning  Posture with ADLs: transfers, sit to stand, lifting: breathing for proper pressure management: \"exhale with exertion\"  Log Rolling for avoiding additional strain to abdominals/ SARAH BETH  Manual Therapy: 30'  (All gloved)  Scar mobs: directly on scar, light; approximation, stretch/ glide, directly above  Standing thoracolumbar fascial release with SARAH BETH approximation  Supportive KT space correction tape: directly above scar (Pt educated in proper wear time for tape 2-3 days & no direct heat including to take off if any pain and/or irritation occurs.) Manual Therapy: 20'  Internal PFM exam: see above for details. Reviewed mechanism of exam & clinical benefit/ purpose of exam. Aftercare:  potential of mild soreness.  Neuro Re-ed: 24'  TA bracing form check  Instruction in pelvic brace: with breathing coordination - HEP  -with internal cueing for manual feedback of form  KT tape \"X\" abdomen for abdominal muscle activation/ bracing with instruction on proper wear time  Coordinating PFM with breathing: HEP Neuro Re-ed: 27'  Pelvic Floor Isolation Exercises - HEP  -pt to ensure no pelvic pain or other \"negative\" symptoms noted with adding in PFM contractions  TA Bracing: Hooklying, Seated, Quadruped  Pilates ring (black) TA press hooklying with trial with SB in hooklying & in standing - HEP  Form check: tabletop (defer). Education for avoiding doming/ coning for self-exercise  Supine march with TA Bracing - HEP  For avoiding glute clenching: PPT in hooklying with low level: add hip Add toni with yellow ball, add hip IR & ER trial (better in ER or neutral for HEP)  Therex: 3'  Seated pelvic mobility/ tilts with SB - HEP  Manual Therapy: 15'  Sidelying crosshand fascial release R/L, OH reach with leg pull R/L  Thoracolumbar PSM/ fascial release with SARAH BETH approximation in standing Therex: 16'  Hip Abd Toni 10x10\" Blue TB - HEP  Clams x 10 R - HEP  Reverse clams x 10 R - HEP  S/L hip Abd x 10 R - HEP  Bridge progressions: regular bridge supine x 1; instruct in option to add hip Abd Toni with Blue TB & with hip Add Toni with ball squeeze - HEP  Education: avoiding chiro & PT concurrent to best assess for pt response to treatment  Education: connection between muscle tightness & weakness (glutes)  Neuro Re-ed: 8'  TA Bracing:   -visual cueing with CereScan video for \"zipper\"  -pt able to perform bracing in which ever positions she feels she can activate the best  Manual Therapy: 26'  (All gloved)  Scar mobs: directly on scar with emollient; including cross friction, approximation, stretch/ glide, connective tissue rolling, directly above & below scar with instruction for pt in how to properly complete for  HEP  -concurrent with the above: scar healing times/ norms, goals for scar presentation  -recommend use of cocoa butter (Palmers), vitamin E oil, and/ or any lotion that is hypoallergenic/ fragrance-free Therex: 20'  Exercise progressions/ modifications: based on pt symptoms: visual, tactile feedback to determine proper form (avoiding doming/ coning, using mirror)  Flat back stretch tableside 2 X 30\" - HEP  Hip Add stretch 1 X 30\" ea - HEP  Neuro Re-ed: 15'  SI belt options - may revisit (check Amazon)  BKFO with TA X 5 ea - HEP  SB alt marches with TA bracing X 10 ea - HEP  Manual Therapy: 10'  Central PA thoracolumbar gr I-II prone over 1 pillow  STM B T/L PSM Self-Care: 15'  Stay hydrated, fiber to avoid constipation with iron  UTI prevention:  -vulvar hygiene  -effect of tampon, condoms, lubricant (ensure compatible with condom)  -effect of prolonged urine holding/ urge suppression (busy): goal is voids every 2-4 hours daytime  Therex: 20'  Pelvic stretches: complete if PFM activation/ irritation from UTI (can be expected)  Deep squat- with stool, without/ X 3' at wall HEP  Happy baby stretch - HEP  Child's pose - HEP  Cat-camel - HEP  Thread the needle - HEP  Open the book - HEP  ^brief trial of all of the above for form check  Neuro Re-ed: 15'  SARAH BETH check/ abdominal bracing check  Education: long-term plan with strengthening. Visual/ kinesthetic awareness. SARAH BETH: more important quality of movement: ensure proper form without any compensation Therex: 30'  HEP: Focus on stretches, stay hydrated (connection UTI to PFD - muscle irritation)  Vitals check  Pt instructed to reach out to provider's team if pt has any s/s yeast infection before starting any otc meds  Pt instructed to call nurse line - during visit  Pt educated in red flag symptoms that require urgent examination by medical provider (not PT)  Neuro Re-ed: 10'  Meditations for Pelvic Health (Dayana Montoya): free resource on [employer] - HEP (brief trial  today)  Breathing techniques: box 4-4-4-4, 4-7-8, alternate nostril - HEP   HEP: TA bracing emphasizing drawing in & zipping, standing posture correction avoiding coning & anterior pelvis  Access Code: [email or web address]  URL: [email or web address]  Date: 06/03/2024  Prepared by: Mayela Forde    Exercises  - Supine Transversus Abdominis Bracing - Hands on Stomach  - 1 x daily - 7 x weekly - 2-3 sec hold - 2-3 minutes    6/14/2024: Log Rolling with bed mobility, breathing with ADLs \"exhale with exertion\"    6/21/2024: Coordinating PFM with Breathing, Pelvic Brace    6/24/2024: Pelvic Floor Isolation Exercises  Access Code: [email or web address]  URL: [email or web address]  Date: 06/24/2024  Prepared by: Mayela Forde    Exercises  - Seated Transversus Abdominis Bracing  - 1 x daily - 7 x weekly - 3 sets - 10 reps  - Quadruped Transversus Abdominis Bracing  - 1 x daily - 7 x weekly - 3 sets - 10 reps  - Abdominal Press into Ball  - 1 x daily - 7 x weekly - 3 sets - 10 reps  - Seated Abdominal Press into Swiss Ball  - 1 x daily - 7 x weekly - 3 sets - 10 reps  - Posterior Pelvic Tilt with Adduction Using Pillow in Hooklying  - 1 x daily - 7 x weekly - 3 sets - 10 reps  - Pelvic Tilt on Swiss Ball  - 1 x daily - 7 x weekly - 3 sets - 10 reps  - Seated Lateral Pelvic Tilt on Swiss Ball  - 1 x daily - 7 x weekly - 3 sets - 10 reps  - Pelvic Circles on Swiss Ball  - 1 x daily - 7 x weekly - 3 sets - 10 reps  - Supine March with Posterior Pelvic Tilt  - 1 x daily - 7 x weekly - 3 sets - 10 reps      Access Code: [email or web address]  URL: [email or web address]  Date: 07/05/2024  Prepared by: Mayela Forde    Exercises  - Hooklying Clamshell with Resistance  - 1 x daily - 3-4 x weekly - 1-2 sets - 10 reps - 10 sec hold  - Clamshell  - 1 x daily - 3-4 x weekly - 2-3 sets - 10 reps  - Sidelying Reverse Clamshell  - 1 x daily - 3-4 x weekly - 2-3 sets - 10 reps  - Sidelying Hip Abduction  - 1 x daily - 3-4 x  weekly - 2-3 sets - 10 reps - 1-2 sec hold  - Supine Bridge  - 1 x daily - 3-4 x weekly - 2-3 sets - 10 reps - 5 sec hold  - Bridge with Hip Abduction and Resistance - Ground Touches  - 1 x daily - 3-4 x weekly - 2-3 sets - 10 reps - 5 sec hold  - Supine Bridge with Mini Swiss Ball Between Knees  - 1 x daily - 3-4 x weekly - 2-3 sets - 10 reps - 5 sec hold      Access Code: [email or web address]  URL: [email or web address]  Date: 07/12/2024  Prepared by: Mayela Forde    Exercises  - Bent Knee Fallouts  - 1 x daily - 7 x weekly - 2 sets - 10 reps  - Swiss Ball March  - 1 x daily - 7 x weekly - 2 sets - 10 reps  - Standing 'L' Stretch at Counter  - 1 x daily - 7 x weekly - 3 sets - 30 sec hold      Access Code: [email or web address]  URL: [email or web address]  Date: 07/19/2024  Prepared by: Mayela Forde    Exercises  - Yoga Squat for Pelvic Floor Relaxation  - 1-2 x daily - 7 x weekly - 3 sets - 1-3 min hold  - Happy Baby with Pelvic Floor Lengthening  - 1-2 x daily - 7 x weekly  - Supine Butterfly Groin Stretch  - 1-2 x daily - 7 x weekly  - Cat Cow  - 1-2 x daily - 7 x weekly - 2 sets - 10 reps - 5 sec hold  - Quadruped Thoracic Rotation - Reach Under  - 1-2 x daily - 7 x weekly - 5 sec hold  - Child's Pose with Thread the Needle  - 1-2 x daily - 7 x weekly - 5 sec hold  - Sidelying Open Book Thoracic Lumbar Rotation and Extension  - 1-2 x daily - 7 x weekly - 10-15 reps - 5 sec hold    Charges:  Neuro X 1, Therex X 2     Total Timed Treatment: 40 min  Total Treatment Time: 40 min

## 2024-07-26 NOTE — TELEPHONE ENCOUNTER
Patient advised and verbalized understanding.  Dr. Vallejo  [address]  [place], [place] [address] [phone]

## 2024-07-26 NOTE — TELEPHONE ENCOUNTER
Patient advised and verbalized understanding.  Waiting for culture results    Palak Fletcher, Palak Howe NurseJust now (11:48 AM)     Unclear if symptoms related to antibiotic or not since she was having them prior to UTI  Await culture  Alternate Tylenol and Motrin for headache  If acutely worsening, recommend IC evaluation

## 2024-07-26 NOTE — TELEPHONE ENCOUNTER
Patient was seen in the walk in clinic for a uti and was given sulfamethoxazole-trimethoprim DS . Per patient she took it last night and today she woke up with the migrans. She isn't niurka if it could be and side effect or just a symptoms of whatever is going on with her . Per  patient she is still having the nausea and dizziness that she was see for by dr. Jensen on 07/18

## 2024-07-26 NOTE — TELEPHONE ENCOUNTER
Patient received urine culture results  Urine culture negative  Patient advised to stop medication by walk in clinic  Patient states when she provided the urine specimen she was on a previous antibiotic for 7 days  Patient concerned that may have altered her results  Patient still having urinary symptoms and not sure what to do.  Please advise

## 2024-08-02 NOTE — PROGRESS NOTES
Diagnosis:   Postpartum examination following  delivery (HCC) (Z39.2)  Pelvic floor weakness (N81.89)         Referring Provider: Teresa Means  Date of Evaluation:    6/3/2024    Precautions:  Drug Allergy, +HPV at 2 yrs ago pap- pt reports currently cleared Next MD visit:   none scheduled  Date of Surgery: n/a   Insurance Primary/Secondary: [employer] [place] P[place] / N/A     # Auth Visits: 40 visit limit, no auth            Subjective: Pt reports testing neg for UTI/ pt was told she does not have UTI. Pt continued taking med to see if would help, but did not so stopped taking; pt was told to stop taking. Current symptoms- pinching pain & pressure: constant, noted deep to PFM, pt has been stretching to see if will help, does not help; sidelying sleeping with BLE together increases pain. Advised to see urologist. No increase in discharge, no burning. Still has dizziness, no headache; pt reports dizziness may be from iron as per physician    Pain: 3/10      Objective: See Flowsheet for details  2024:  Informed verbal consent for internal pelvic evaluation given: Yes    External Observation:  Mons pubis: WNL  Labia majora: WNL  Labia minora: WNL  Urethral meatus: WNL  Introitus: WNL  Perineal body: WNL    Sensory/Reflex:  Vestibule: normal bilaterally    Internal Palpation: WNL except Bulbocavernosus B minimal restriction and pain  Pubococcygeus/Pubovaginalis B minimal restriction and pain  Iliococcygeus B pain  Obturator Internus B minimal restriction and pain  Pelvic Clock 6 o'clock introitus moderate restriction and pain  Min restricted Clitoral Prepuce mobility, no change in symptoms      Assessment: As pt reports testing neg for UTI, advised in benefit of proceeding with internal PFM palpation to determine if any TTP/ STRs noted that could be contributing to symptoms. Varied TTP noted as listed above. Pt reported reduced symptoms after internal PFM MFR completed. Pt was advised this is encouraging as to NITA  connection to pt's symptoms, & was advised to keep PT posted as to determine next steps moving forward. Was instructed in seated perineal towel roll stretch to complement treatment. Pt reported feeling more pull on the L>R with stretch. Pt was advised to follow up with physician re: any questions re: previous urine culture/ urinalysis results for additional education & information. Pt in agreement to plan.      Goals: (to be met in 10 visits)  Patient will demonstrate sufficient 360 expansion with Diaphragmatic Breathing for properly lengthening PFM for resolved urinary hesitancy.  Patient demonstrates increased tension at linea alba with decreased depth of palpation & reduced SARAH BETH at umbilicus from 3 to 2 finger width for improved ability to engage in ADLs.  Patient exhibits an increased in B hip Abduction strength from 4-/5 to at least 4+/5 to allow for increased tolerance to walking & exercise/ physical activity.  Patient will report reduced nocturia from 1-2x to 0-1x max for improved rest & function daytime.  Patient will report voids every 2-4 hours daytime for improved bowel/ bladder health & reduced urinary urgency.  Patient will report resolved pain with intercourse & pelvic exams.  Patient understands importance of performing HEP to prevent reoccurrence of symptoms.    Plan: Next visit: Progress Note. Pending pt's response to internal pelvic MFR, may benefit from use of pelvic wand to supplement care for HEP.  Date: 6/21/2024  TX#: 3/10 Date: 6/24/2024     TX#: 4/10 Date: 7/5/2024  TX#: 5/10 Date: 7/12/2024  TX#: 6/10 Date: 7/19/2024  TX#: 7/10 Date: 7/26/2024  TX#: 8/10 Date: 8/2/2024  TX#: 9/10   Manual Therapy: 20'  Internal PFM exam: see above for details. Reviewed mechanism of exam & clinical benefit/ purpose of exam. Aftercare: potential of mild soreness.  Neuro Re-ed: 24'  TA bracing form check  Instruction in pelvic brace: with breathing coordination - HEP  -with internal cueing for manual feedback  of form  KT tape \"X\" abdomen for abdominal muscle activation/ bracing with instruction on proper wear time  Coordinating PFM with breathing: HEP Neuro Re-ed: 27'  Pelvic Floor Isolation Exercises - HEP  -pt to ensure no pelvic pain or other \"negative\" symptoms noted with adding in PFM contractions  TA Bracing: Hooklying, Seated, Quadruped  Pilates ring (black) TA press hooklying with trial with SB in hooklying & in standing - HEP  Form check: tabletop (defer). Education for avoiding doming/ coning for self-exercise  Supine march with TA Bracing - HEP  For avoiding glute clenching: PPT in hooklying with low level: add hip Add toni with yellow ball, add hip IR & ER trial (better in ER or neutral for HEP)  Therex: 3'  Seated pelvic mobility/ tilts with SB - HEP  Manual Therapy: 15'  Sidelying crosshand fascial release R/L, OH reach with leg pull R/L  Thoracolumbar PSM/ fascial release with SARAH BETH approximation in standing Therex: 16'  Hip Abd Toni 10x10\" Blue TB - HEP  Clams x 10 R - HEP  Reverse clams x 10 R - HEP  S/L hip Abd x 10 R - HEP  Bridge progressions: regular bridge supine x 1; instruct in option to add hip Abd Toni with Blue TB & with hip Add Toni with ball squeeze - HEP  Education: avoiding chiro & PT concurrent to best assess for pt response to treatment  Education: connection between muscle tightness & weakness (glutes)  Neuro Re-ed: 8'  TA Bracing:   -visual cueing with instagram video for \"zipper\"  -pt able to perform bracing in which ever positions she feels she can activate the best  Manual Therapy: 26'  (All gloved)  Scar mobs: directly on scar with emollient; including cross friction, approximation, stretch/ glide, connective tissue rolling, directly above & below scar with instruction for pt in how to properly complete for HEP  -concurrent with the above: scar healing times/ norms, goals for scar presentation  -recommend use of cocoa butter ([place]mers), vitamin E oil, and/ or any lotion that is  hypoallergenic/ fragrance-free Therex: 20'  Exercise progressions/ modifications: based on pt symptoms: visual, tactile feedback to determine proper form (avoiding doming/ coning, using mirror)  Flat back stretch tableside 2 X 30\" - HEP  Hip Add stretch 1 X 30\" ea - HEP  Neuro Re-ed: 15'  SI belt options - may revisit (check [employer])  BKFO with TA X 5 ea - HEP  SB alt marches with TA bracing X 10 ea - HEP  Manual Therapy: 10'  Central PA thoracolumbar gr I-II prone over 1 pillow  STM B T/L PSM Self-Care: 15'  Stay hydrated, fiber to avoid constipation with iron  UTI prevention:  -vulvar hygiene  -effect of tampon, condoms, lubricant (ensure compatible with condom)  -effect of prolonged urine holding/ urge suppression (busy): goal is voids every 2-4 hours daytime  Therex: 20'  Pelvic stretches: complete if PFM activation/ irritation from UTI (can be expected)  Deep squat- with stool, without/ X 3' at wall HEP  Happy baby stretch - HEP  Child's pose - HEP  Cat-camel - HEP  Thread the needle - HEP  Open the book - HEP  ^brief trial of all of the above for form check  Neuro Re-ed: 15'  SARAH BETH check/ abdominal bracing check  Education: long-term plan with strengthening. Visual/ kinesthetic awareness. SARAH BETH: more important quality of movement: ensure proper form without any compensation Therex: 30'  HEP: Focus on stretches, stay hydrated (connection UTI to PFD - muscle irritation)  Vitals check  Pt instructed to reach out to provider's team if pt has any s/s yeast infection before starting any otc meds  Pt instructed to call nurse line - during visit  Pt educated in red flag symptoms that require urgent examination by medical provider (not PT)  Neuro Re-ed: 10'  Meditations for Pelvic Health (Dayana Montoya): free resource on [employer] - HEP (brief trial today)  Breathing techniques: box 4-4-4-4, 4-7-8, alternate nostril - HEP Therex: 16'  Education: PFD mimicking UTI symptoms  Seated perineal stretch with rolled towels: trial  today - HEP  Manual Therapy: 24'  Internal PFM MFR: B Bulbocavernosus, B Pubococcygeus, B OI, introital stretch  -education in self-MFR introital stretch with digit for HEP as an option pending pt comfort/ tolerance  Aftercare: educated in findings; potential of normal soreness, use of ice/ heat prn pending symptoms.   HEP: TA bracing emphasizing drawing in & zipping, standing posture correction avoiding coning & anterior pelvis  Access Code: [email or web address]  URL: [email or web address]  Date: 06/03/2024  Prepared by: Mayela Forde    Exercises  - Supine Transversus Abdominis Bracing - Hands on Stomach  - 1 x daily - 7 x weekly - 2-3 sec hold - 2-3 minutes    6/14/2024: Log Rolling with bed mobility, breathing with ADLs \"exhale with exertion\"    6/21/2024: Coordinating PFM with Breathing, Pelvic Brace    6/24/2024: Pelvic Floor Isolation Exercises  Access Code: [email or web address]  URL: [email or web address]  Date: 06/24/2024  Prepared by: Mayela Forde    Exercises  - Seated Transversus Abdominis Bracing  - 1 x daily - 7 x weekly - 3 sets - 10 reps  - Quadruped Transversus Abdominis Bracing  - 1 x daily - 7 x weekly - 3 sets - 10 reps  - Abdominal Press into Ball  - 1 x daily - 7 x weekly - 3 sets - 10 reps  - Seated Abdominal Press into Swiss Ball  - 1 x daily - 7 x weekly - 3 sets - 10 reps  - Posterior Pelvic Tilt with Adduction Using Pillow in Hooklying  - 1 x daily - 7 x weekly - 3 sets - 10 reps  - Pelvic Tilt on Swiss Ball  - 1 x daily - 7 x weekly - 3 sets - 10 reps  - Seated Lateral Pelvic Tilt on Swiss Ball  - 1 x daily - 7 x weekly - 3 sets - 10 reps  - Pelvic Circles on Swiss Ball  - 1 x daily - 7 x weekly - 3 sets - 10 reps  - Supine March with Posterior Pelvic Tilt  - 1 x daily - 7 x weekly - 3 sets - 10 reps      Access Code: [email or web address]  URL: [email or web address]  Date: 07/05/2024  Prepared by: Mayela Forde    Exercises  - Hooklying Clamshell with Resistance  - 1 x daily  - 3-4 x weekly - 1-2 sets - 10 reps - 10 sec hold  - Clamshell  - 1 x daily - 3-4 x weekly - 2-3 sets - 10 reps  - Sidelying Reverse Clamshell  - 1 x daily - 3-4 x weekly - 2-3 sets - 10 reps  - Sidelying Hip Abduction  - 1 x daily - 3-4 x weekly - 2-3 sets - 10 reps - 1-2 sec hold  - Supine Bridge  - 1 x daily - 3-4 x weekly - 2-3 sets - 10 reps - 5 sec hold  - Bridge with Hip Abduction and Resistance - Ground Touches  - 1 x daily - 3-4 x weekly - 2-3 sets - 10 reps - 5 sec hold  - Supine Bridge with Mini Swiss Ball Between Knees  - 1 x daily - 3-4 x weekly - 2-3 sets - 10 reps - 5 sec hold      Access Code: [email or web address]  URL: [email or web address]  Date: 07/12/2024  Prepared by: Mayela Forde    Exercises  - Bent Knee Fallouts  - 1 x daily - 7 x weekly - 2 sets - 10 reps  - Swiss Ball March  - 1 x daily - 7 x weekly - 2 sets - 10 reps  - Standing 'L' Stretch at Counter  - 1 x daily - 7 x weekly - 3 sets - 30 sec hold      Access Code: [email or web address]  URL: [email or web address]  Date: 07/19/2024  Prepared by: Mayela Forde    Exercises  - Yoga Squat for Pelvic Floor Relaxation  - 1-2 x daily - 7 x weekly - 3 sets - 1-3 min hold  - Happy Baby with Pelvic Floor Lengthening  - 1-2 x daily - 7 x weekly  - Supine Butterfly Groin Stretch  - 1-2 x daily - 7 x weekly  - Cat Cow  - 1-2 x daily - 7 x weekly - 2 sets - 10 reps - 5 sec hold  - Quadruped Thoracic Rotation - Reach Under  - 1-2 x daily - 7 x weekly - 5 sec hold  - Child's Pose with Thread the Needle  - 1-2 x daily - 7 x weekly - 5 sec hold  - Sidelying Open Book Thoracic Lumbar Rotation and Extension  - 1-2 x daily - 7 x weekly - 10-15 reps - 5 sec hold    Charges: Therex X 1, Manual X 2     Total Timed Treatment: 40 min  Total Treatment Time: 40 min

## 2024-08-09 NOTE — PROGRESS NOTES
Diagnosis:   Postpartum examination following  delivery (HCC) (Z39.2)  Pelvic floor weakness (N81.89)         Referring Provider: Teresa Means  Date of Evaluation:    6/3/2024    Precautions:  Drug Allergy, +HPV at 2 yrs ago pap- pt reports currently cleared Next MD visit:   none scheduled  Date of Surgery: n/a   Insurance Primary/Secondary: [employer] [place] P[place] / N/A     # Auth Visits: 40 visit limit, no auth             Progress Summary  Pt has attended 10 visits in Physical Therapy.     Subjective: Pt reports pinching feeling is better; after working internally felt so much better; my scar is kind of hurting a little bit, not horrible pain but annoying/ stinging; admits not doing ex's everyday though a lot going on in personal life this week. Urgency has resolved- does not feel she needs to void all the time anymore  Pt Priorities:   1) hoping close the ab separation to do functional exercise again, does not want to make things worse, \"want to feel comfortable in my body\" (abs- gotten better; not sure if she is making a ton of progress; feels almost halted progress. Can do it, can't sustain it; standing- strong engagement, sitting ok- feels a really hard time engaging her core on her back)  2) pelvic floor pinching- \"one of my main concerns\" (Worked internally- good trajectory, stretching)  3) low back pain    Pain: 3/10      Objective: See Flowsheet for details  2024:  Strength (MMT): Hip Abduction: R 5/5, L 4+/5    Diastasis Recti: (finger width without/ with contraction)  Above Umbilicus: 2 at IE, today 0-1 without bracing; 0 with bracing  Umbilicus: 3/ 2 at IE, today 2 without bracing; 1 with bracing  Below Umbilicus: 1 at IE, 0 without bracing; 0 with bracing    Reduced depth of palpation/ increased tension at linea alba  +doming with abdominal bracing: pt still has come coordination deficit/ dyssynergy with abdominal bracing. No coning      2024:  Informed verbal consent for internal pelvic  evaluation given: Yes    External Observation:  Mons pubis: WNL  Labia majora: WNL  Labia minora: WNL  Urethral meatus: WNL  Introitus: WNL  Perineal body: WNL    Sensory/Reflex:  Vestibule: normal bilaterally    Internal Palpation: WNL except Bulbocavernosus B minimal restriction and pain  Pubococcygeus/Pubovaginalis B minimal restriction and pain  Iliococcygeus B pain  Obturator Internus B minimal restriction and pain  Pelvic Clock 6 o'clock introitus moderate restriction and pain  Min restricted Clitoral Prepuce mobility, no change in symptoms      Assessment: Pt has completed 10 visits of skilled PT. Pt has made steady improvements both objectively & subjectively, including on therapy goals. Pt will continue to benefit from ongoing PT to address remaining functional limitations to facilitate continued progression & appropriate discharge to comprehensive HEP. Thank you.      Goals: (to be met in 16 visits)  Patient will demonstrate sufficient 360 expansion with Diaphragmatic Breathing for properly lengthening PFM for resolved urinary hesitancy. - progressing towards  Patient demonstrates increased tension at linea alba with decreased depth of palpation & reduced SARAH BETH at umbilicus from 3 to 2 finger width for improved ability to engage in ADLs. - met  Patient exhibits an increased in B hip Abduction strength from 4-/5 to at least 4+/5 to allow for increased tolerance to walking & exercise/ physical activity. - met  Patient will report reduced nocturia from 1-2x to 0-1x max for improved rest & function daytime. - met  Patient will report voids every 2-4 hours daytime for improved bowel/ bladder health & reduced urinary urgency. - met  Patient will report resolved pain with intercourse & pelvic exams. - progressing towards  Patient understands importance of performing HEP to prevent reoccurrence of symptoms. - progressing towards    Plan: Continue skilled Physical Therapy 1-2 x/week or a total of 6 visits over a 90  day period. Treatment will (may) include: Manual Therapy, Neuromuscular Re-education, Self-Care Home Management, Therapeutic Activities, Therapeutic Exercise, Home Exercise Program instruction, and Modalities to include: Electrical stimulation (unattended) and Ultrasound  Pt is aware PT will be out of office from 8/15 & returning 8/26 & will contact central scheduling if any issues arise during this time. Next appointment confirmed. Pt is on Wait List due to scheduling conflicts. Pt is aware of policy for Wait List. Pt to perform HEP during time away from PT & will contact PT with any questions/ concerns. Pt is advised to bring in pelvic wand to subsequent visit if pt obtains to best complement & support care.  Patient/Family/Caregiver was advised of these findings, precautions, and treatment options and has agreed to actively participate in planning and for this course of care.    Thank you for your referral. If you have any questions, please contact me at Dept: [phone].    Sincerely,  Electronically signed by therapist: Mayela Forde, PT     Physician's certification required:  Yes  Please co-sign or sign and return this letter via fax as soon as possible to [phone].   I certify the need for these services furnished under this plan of treatment and while under my care.    X___________________________________________________ Date____________________    Certification From: 8/9/2024  To:11/7/2024     Date: 7/12/2024  TX#: 6/10 Date: 7/19/2024  TX#: 7/10 Date: 7/26/2024  TX#: 8/10 Date: 8/2/2024  TX#: 9/10 Date: 8/9/2024  TX#: 10/10  Progress Note   Therex: 20'  Exercise progressions/ modifications: based on pt symptoms: visual, tactile feedback to determine proper form (avoiding doming/ coning, using mirror)  Flat back stretch tableside 2 X 30\" - HEP  Hip Add stretch 1 X 30\" ea - HEP  Neuro Re-ed: 15'  SI belt options - may revisit (check [employer])  BKFO with TA X 5 ea - HEP  SB alt marches with TA bracing X 10  ea - HEP  Manual Therapy: 10'  Central PA thoracolumbar gr I-II prone over 1 pillow  STM B T/L PSM Self-Care: 15'  Stay hydrated, fiber to avoid constipation with iron  UTI prevention:  -vulvar hygiene  -effect of tampon, condoms, lubricant (ensure compatible with condom)  -effect of prolonged urine holding/ urge suppression (busy): goal is voids every 2-4 hours daytime  Therex: 20'  Pelvic stretches: complete if PFM activation/ irritation from UTI (can be expected)  Deep squat- with stool, without/ X 3' at wall HEP  Happy baby stretch - HEP  Child's pose - HEP  Cat-camel - HEP  Thread the needle - HEP  Open the book - HEP  ^brief trial of all of the above for form check  Neuro Re-ed: 15'  SARAH BETH check/ abdominal bracing check  Education: long-term plan with strengthening. Visual/ kinesthetic awareness. SARAH BETH: more important quality of movement: ensure proper form without any compensation Therex: 30'  HEP: Focus on stretches, stay hydrated (connection UTI to PFD - muscle irritation)  Vitals check  Pt instructed to reach out to provider's team if pt has any s/s yeast infection before starting any otc meds  Pt instructed to call nurse line - during visit  Pt educated in red flag symptoms that require urgent examination by medical provider (not PT)  Neuro Re-ed: 10'  Meditations for Pelvic Health (Dayana Montoya): free resource on [employer] - HEP (brief trial today)  Breathing techniques: box 4-4-4-4, 4-7-8, alternate nostril - HEP Therex: 16'  Education: PFD mimicking UTI symptoms  Seated perineal stretch with rolled towels: trial today - HEP  Manual Therapy: 24'  Internal PFM MFR: B Bulbocavernosus, B Pubococcygeus, B OI, introital stretch  -education in self-MFR introital stretch with digit for HEP as an option pending pt comfort/ tolerance  Aftercare: educated in findings; potential of normal soreness, use of ice/ heat prn pending symptoms. Neuro Re-ed: 17'  Breathing if time for 1 exercise/ time constraints  Pelvic  Wand:  -pt to self-research. Demo in clinic for visual & tactile handling. Pt may decide any brand, material. May be of great benefit for supplementing PT visits & for HEP. Pt should not start using on her own without having PT instruct.  Connection to abdominals: PFM, muscle restriction  Therex: 23'  HEP update:  -Plank progressions  -Crabwalk/ side steps  -Hip abd/ ext with TheraBand  ^pt verbalized understanding  Re-assessment, goal status completion  Verbal instruction in self-performed introital stretch for HEP; pt verbalized understanding           HEP: TA bracing emphasizing drawing in & zipping, standing posture correction avoiding coning & anterior pelvis  Access Code: [email or web address]  URL: [email or web address]  Date: 06/03/2024  Prepared by: Mayela Forde    Exercises  - Supine Transversus Abdominis Bracing - Hands on Stomach  - 1 x daily - 7 x weekly - 2-3 sec hold - 2-3 minutes    6/14/2024: Log Rolling with bed mobility, breathing with ADLs \"exhale with exertion\"    6/21/2024: Coordinating PFM with Breathing, Pelvic Brace    6/24/2024: Pelvic Floor Isolation Exercises  Access Code: [email or web address]  URL: [email or web address]  Date: 06/24/2024  Prepared by: Mayela Forde    Exercises  - Seated Transversus Abdominis Bracing  - 1 x daily - 7 x weekly - 3 sets - 10 reps  - Quadruped Transversus Abdominis Bracing  - 1 x daily - 7 x weekly - 3 sets - 10 reps  - Abdominal Press into Ball  - 1 x daily - 7 x weekly - 3 sets - 10 reps  - Seated Abdominal Press into Swiss Ball  - 1 x daily - 7 x weekly - 3 sets - 10 reps  - Posterior Pelvic Tilt with Adduction Using Pillow in Hooklying  - 1 x daily - 7 x weekly - 3 sets - 10 reps  - Pelvic Tilt on Swiss Ball  - 1 x daily - 7 x weekly - 3 sets - 10 reps  - Seated Lateral Pelvic Tilt on Swiss Ball  - 1 x daily - 7 x weekly - 3 sets - 10 reps  - Pelvic Circles on Swiss Ball  - 1 x daily - 7 x weekly - 3 sets - 10 reps  - Supine March with Posterior  Pelvic Tilt  - 1 x daily - 7 x weekly - 3 sets - 10 reps      Access Code: [email or web address]  URL: [email or web address][employer]/  Date: 07/05/2024  Prepared by: Mayela Forde    Exercises  - Hooklying Clamshell with Resistance  - 1 x daily - 3-4 x weekly - 1-2 sets - 10 reps - 10 sec hold  - Clamshell  - 1 x daily - 3-4 x weekly - 2-3 sets - 10 reps  - Sidelying Reverse Clamshell  - 1 x daily - 3-4 x weekly - 2-3 sets - 10 reps  - Sidelying Hip Abduction  - 1 x daily - 3-4 x weekly - 2-3 sets - 10 reps - 1-2 sec hold  - Supine Bridge  - 1 x daily - 3-4 x weekly - 2-3 sets - 10 reps - 5 sec hold  - Bridge with Hip Abduction and Resistance - Ground Touches  - 1 x daily - 3-4 x weekly - 2-3 sets - 10 reps - 5 sec hold  - Supine Bridge with Mini Swiss Ball Between Knees  - 1 x daily - 3-4 x weekly - 2-3 sets - 10 reps - 5 sec hold      Access Code: [email or web address]  URL: https://"[employer]/  Date: 07/12/2024  Prepared by: Mayela Forde    Exercises  - Bent Knee Fallouts  - 1 x daily - 7 x weekly - 2 sets - 10 reps  - Swiss Ball March  - 1 x daily - 7 x weekly - 2 sets - 10 reps  - Standing 'L' Stretch at Counter  - 1 x daily - 7 x weekly - 3 sets - 30 sec hold      Access Code: [email or web address]  URL: https://"[employer]/  Date: 07/19/2024  Prepared by: Mayela Forde    Exercises  - Yoga Squat for Pelvic Floor Relaxation  - 1-2 x daily - 7 x weekly - 3 sets - 1-3 min hold  - Happy Baby with Pelvic Floor Lengthening  - 1-2 x daily - 7 x weekly  - Supine Butterfly Groin Stretch  - 1-2 x daily - 7 x weekly  - Cat Cow  - 1-2 x daily - 7 x weekly - 2 sets - 10 reps - 5 sec hold  - Quadruped Thoracic Rotation - Reach Under  - 1-2 x daily - 7 x weekly - 5 sec hold  - Child's Pose with Thread the Needle  - 1-2 x daily - 7 x weekly - 5 sec hold  - Sidelying Open Book Thoracic Lumbar Rotation and Extension  - 1-2 x daily - 7 x weekly - 10-15 reps - 5 sec hold    Charges: Neuro X 1, Therex X 2     Total Timed  Treatment: 40 min  Total Treatment Time: 40 min

## 2024-08-09 NOTE — PATIENT INSTRUCTIONS
Pelvic wand- since you liked the silicone material feel free to check [employer] (this is what you saw today), [employer]. If you find a different brand that you like this may work too. Feel free to reach out if you have any questions. Thanks!

## 2024-08-22 NOTE — PATIENT INSTRUCTIONS
Birth Control (Contraception) Options    Pregnancy rate (failure rate) is the number out of every 100 women who experience an unintended pregnancy within the first year of typical use.  Condoms should always be used to reduce the risk of sexually transmitted infections.    Hormonal    Monthly oral contraceptive (the Pill)- Take one pill every day as directed.  Possible hormonal side effects include headaches, water retention, nausea and a small amount of weight gain.  Side effects usually improve over time.  Pregnancy rate 9%    Extended-cycle regimen oral contraceptive- Same as above, but allows your cycle to come every three months instead of every month.  Pregnancy rate 9%    Patch (Ortho-Evra)- Applied to skin and changed weekly.  Same hormonal side effects as the pill.  Pregnancy rate 9%    Vaginal Ring (Nuvaring)- Inserted vaginally and left in place for 21 days, switched monthly.  Same hormonal side effects as the pill.  Pregnancy rate 9%    Injection (Depo-Provera)- Injection given every 12 weeks.  Slightly more side effects than the pill.  Pregnancy rate 6%    Hormonal IUD (MIrena, Kyleena, Karlie, Liletta)- Inserted into the uterus (womb) during an office visit.  Can remain in place for up to 3-5 years, depending on which IUD is used.  Pregnancy rate 0.2%    Implantable contraceptive (the khloe, Nexplanon)- Inserted under the skin of the upper arm as a quick office procedure.  Can remain in place for up to 3 years.  Pregnancy rate 0.05%      Non-Hormonal    Spermicide- Apply every time before sex.  Pregnancy rate 28%    Diaphragm- Inserted every time before sex and for 6 hours afterwards.  Size fitted during office visit.  Pregnancy rate 12%    Contraceptive sponge (Today)- Inserted vaginally before sex and kept in place for 6 hours afterwards, effective for 24 hours.  Available at pharmacy without prescription.  Pregnancy rate 12-24%    Cervical cap- Inserted vaginally before sex and kept in place for 6  Patient Name: Alexandria Pepper    Specialist or PCP Name: MD EUGENIA RODRÍGUEZ     Symptoms: HIVES ON LEGS, ITCH WON'T GO AWAY     Pregnant (females aged 13-60. If Yes, how long?) : NO    Call Back # : [phone]    Which State are you currently located in?: [place]     Name of Clinic Site / Acct# : [facility]             hours afterwards.  Size fitted at office.    Female condom- Inserted vaginally every time before sex.  Available at pharmacy without prescription.  Pregnancy rate 21%    Male condom- Partner must wear every time during sex. Available at pharmacy without prescription.  Pregnancy rate 18%    Nonhormonal IUD (Paragard)- Inserted into the uterus (womb) during an office visit.  Can remain in place for up to 10 years.  Pregnancy rate 0.8%    Male/Female sterilization- Outpatient surgery for the male (vasectomy) or female (tubes tied/removed).  Permanent.  Pregnancy rate 0.15-0.5%    Withdrawal (Pulling Out)- Pulling the penis out of the vagina before ejaculation.  Pregnancy rate 22%    Natural Family Planning (Fertility Awareness Based Planning)- Various apps and protocols available.  Pregnancy rate 24%

## 2024-09-16 NOTE — PROGRESS NOTES
Diagnosis:   Postpartum examination following  delivery (HCC) (Z39.2)  Pelvic floor weakness (N81.89)         Referring Provider: Teresa Means  Date of Evaluation:    6/3/2024    Precautions:  Drug Allergy, +HPV at 2 yrs ago pap- pt reports currently cleared Next MD visit:   none scheduled  Date of Surgery: n/a   Insurance Primary/Secondary: [employer] [place] / N/A     # Auth Visits: 40 visit limit, no auth            Subjective: Pt reports increased PFM pain: reports compliance with exercises/ stretches at least 5/7 days. Pt reports doing the \"rule of three\". Worse with sidelying laying (having the legs closed together gives her more pressure). Some tension at the scar. Started weights with full body workouts: felt bruised (did not see) at the abdominals with pain & discomfort afterwards. Admits physically is not making progress with the shrinking of her stomach: abdomen is protruding out. Pt reports a lot going on/ increased stress with work life as well as personal life. Still waiting to hear update from the blood work. Wants to get this pinching & pressure resolved. It is all day discomfort. If she purposely relaxes pelvic floor, pelvic tilt: feels better. Used the pelvic wand surface level- it is helping (has not gone internal)    Pain: pressure & discomfort 6-7/10 (pt points to B adductor attachment at pubic bones/ connection to PFM)      Objective: See Flowsheet for details  2024:  Scar: restriction more noted on pt's R & mid vs pt's L      2024:  Strength (MMT): Hip Abduction: R 5/5, L 4+/5    Diastasis Recti: (finger width without/ with contraction)  Above Umbilicus: 2 at IE, today 0-1 without bracing; 0 with bracing  Umbilicus: 3/ 2 at IE, today 2 without bracing; 1 with bracing  Below Umbilicus: 1 at IE, 0 without bracing; 0 with bracing    Reduced depth of palpation/ increased tension at linea alba  +doming with abdominal bracing: pt still has come coordination deficit/ dyssynergy with  abdominal bracing. No coning      8/2/2024:  Informed verbal consent for internal pelvic evaluation given: Yes    External Observation:  Mons pubis: WNL  Labia majora: WNL  Labia minora: WNL  Urethral meatus: WNL  Introitus: WNL  Perineal body: WNL    Sensory/Reflex:  Vestibule: normal bilaterally    Internal Palpation: WNL except Bulbocavernosus B minimal restriction and pain  Pubococcygeus/Pubovaginalis B minimal restriction and pain  Iliococcygeus B pain  Obturator Internus B minimal restriction and pain  Pelvic Clock 6 o'clock introitus moderate restriction and pain  Min restricted Clitoral Prepuce mobility, no change in symptoms      Assessment: Pt brought pelvic wand to session & preferred to emphasize instruction from PT how to properly complete into all 3 layers for home use. Pt reported feeling more PFM tension to layer 1 L=R & then in layer 3 L=R \"down\" - demo on pelvic model the PFM involved for pt understanding.       Goals: (to be met in 16 visits)  Patient will demonstrate sufficient 360 expansion with Diaphragmatic Breathing for properly lengthening PFM for resolved urinary hesitancy. - progressing towards  Patient demonstrates increased tension at linea alba with decreased depth of palpation & reduced SARAH BETH at umbilicus from 3 to 2 finger width for improved ability to engage in ADLs. - met  Patient exhibits an increased in B hip Abduction strength from 4-/5 to at least 4+/5 to allow for increased tolerance to walking & exercise/ physical activity. - met  Patient will report reduced nocturia from 1-2x to 0-1x max for improved rest & function daytime. - met  Patient will report voids every 2-4 hours daytime for improved bowel/ bladder health & reduced urinary urgency. - met  Patient will report resolved pain with intercourse & pelvic exams. - progressing towards  Patient understands importance of performing HEP to prevent reoccurrence of symptoms. - progressing towards    Plan: Check PERF to see if pt  should increase the intensity & frequency of PFM recruitment  Date: 7/12/2024  TX#: 6/10 Date: 7/19/2024  TX#: 7/10 Date: 7/26/2024  TX#: 8/10 Date: 8/2/2024  TX#: 9/10 Date: 8/9/2024  TX#: 10/10  Progress Note Date: 9/16/2024  TX#: 11/16   Therex: 20'  Exercise progressions/ modifications: based on pt symptoms: visual, tactile feedback to determine proper form (avoiding doming/ coning, using mirror)  Flat back stretch tableside 2 X 30\" - HEP  Hip Add stretch 1 X 30\" ea - HEP  Neuro Re-ed: 15'  SI belt options - may revisit (check Amazon)  BKFO with TA X 5 ea - HEP  SB alt marches with TA bracing X 10 ea - HEP  Manual Therapy: 10'  Central PA thoracolumbar gr I-II prone over 1 pillow  STM B T/L PSM Self-Care: 15'  Stay hydrated, fiber to avoid constipation with iron  UTI prevention:  -vulvar hygiene  -effect of tampon, condoms, lubricant (ensure compatible with condom)  -effect of prolonged urine holding/ urge suppression (busy): goal is voids every 2-4 hours daytime  Therex: 20'  Pelvic stretches: complete if PFM activation/ irritation from UTI (can be expected)  Deep squat- with stool, without/ X 3' at wall HEP  Happy baby stretch - HEP  Child's pose - HEP  Cat-camel - HEP  Thread the needle - HEP  Open the book - HEP  ^brief trial of all of the above for form check  Neuro Re-ed: 15'  SARAH BETH check/ abdominal bracing check  Education: long-term plan with strengthening. Visual/ kinesthetic awareness. SARAH BETH: more important quality of movement: ensure proper form without any compensation Therex: 30'  HEP: Focus on stretches, stay hydrated (connection UTI to PFD - muscle irritation)  Vitals check  Pt instructed to reach out to provider's team if pt has any s/s yeast infection before starting any otc meds  Pt instructed to call nurse line - during visit  Pt educated in red flag symptoms that require urgent examination by medical provider (not PT)  Neuro Re-ed: 10'  Meditations for Pelvic Health (Dayana Montoya): free resource  on YouTube - HEP (brief trial today)  Breathing techniques: box 4-4-4-4, 4-7-8, alternate nostril - HEP Therex: 16'  Education: PFD mimicking UTI symptoms  Seated perineal stretch with rolled towels: trial today - HEP  Manual Therapy: 24'  Internal PFM MFR: B Bulbocavernosus, B Pubococcygeus, B OI, introital stretch  -education in self-MFR introital stretch with digit for HEP as an option pending pt comfort/ tolerance  Aftercare: educated in findings; potential of normal soreness, use of ice/ heat prn pending symptoms. Neuro Re-ed: 17'  Breathing if time for 1 exercise/ time constraints  Pelvic Wand:  -pt to self-research. Demo in clinic for visual & tactile handling. Pt may decide any brand, material. May be of great benefit for supplementing PT visits & for HEP. Pt should not start using on her own without having PT instruct.  Connection to abdominals: PFM, muscle restriction  Therex: 23'  HEP update:  -Plank progressions  -Crabwalk/ side steps  -Hip abd/ ext with TheraBand  ^pt verbalized understanding  Re-assessment, goal status completion  Verbal instruction in self-performed introital stretch for HEP; pt verbalized understanding         Therex: 20'  Verbal re-assess 2/2 gap in care (see above)  DOMS  Glute work: connection to PFM  Neuro Re-ed: 30'  Pelvic Wand instruction: pt consented for verbal instruction with visual exam from PT  -verbal cueing only, pt completing  -avoid 12 o clock  -reviewed on pelvic model relevant findings  Education: promoting PFM relaxation: body cues for drop, melt, relax, let go. Connection to jaw clenching: relax shoulders, deep breath, physiologic \"sigh\"  Pelvic floor weakness & tightness connection  -may need to increase kegels: will re-assess at subsequent visit(s)  Manual Therapy: 10'  Scar (directly to skin, gloved): fascial glide/ stretch, approximation, mobs, skin/ scar rolling  -review self-scar mobs for HEP. Educate in potential of normal soreness from Tx   HEP: TA  bracing emphasizing drawing in & zipping, standing posture correction avoiding coning & anterior pelvis  Access Code: [email or web address]  URL: [email or web address]  Date: 06/03/2024  Prepared by: Mayela Forde    Exercises  - Supine Transversus Abdominis Bracing - Hands on Stomach  - 1 x daily - 7 x weekly - 2-3 sec hold - 2-3 minutes    6/14/2024: Log Rolling with bed mobility, breathing with ADLs \"exhale with exertion\"    6/21/2024: Coordinating PFM with Breathing, Pelvic Brace    6/24/2024: Pelvic Floor Isolation Exercises  Access Code: [email or web address]  URL: [email or web address]  Date: 06/24/2024  Prepared by: Mayela Forde    Exercises  - Seated Transversus Abdominis Bracing  - 1 x daily - 7 x weekly - 3 sets - 10 reps  - Quadruped Transversus Abdominis Bracing  - 1 x daily - 7 x weekly - 3 sets - 10 reps  - Abdominal Press into Ball  - 1 x daily - 7 x weekly - 3 sets - 10 reps  - Seated Abdominal Press into Swiss Ball  - 1 x daily - 7 x weekly - 3 sets - 10 reps  - Posterior Pelvic Tilt with Adduction Using Pillow in Hooklying  - 1 x daily - 7 x weekly - 3 sets - 10 reps  - Pelvic Tilt on Swiss Ball  - 1 x daily - 7 x weekly - 3 sets - 10 reps  - Seated Lateral Pelvic Tilt on Swiss Ball  - 1 x daily - 7 x weekly - 3 sets - 10 reps  - Pelvic Circles on Swiss Ball  - 1 x daily - 7 x weekly - 3 sets - 10 reps  - Supine March with Posterior Pelvic Tilt  - 1 x daily - 7 x weekly - 3 sets - 10 reps      Access Code: [email or web address]  URL: [email or web address]  Date: 07/05/2024  Prepared by: Mayela Forde    Exercises  - Hooklying Clamshell with Resistance  - 1 x daily - 3-4 x weekly - 1-2 sets - 10 reps - 10 sec hold  - Clamshell  - 1 x daily - 3-4 x weekly - 2-3 sets - 10 reps  - Sidelying Reverse Clamshell  - 1 x daily - 3-4 x weekly - 2-3 sets - 10 reps  - Sidelying Hip Abduction  - 1 x daily - 3-4 x weekly - 2-3 sets - 10 reps - 1-2 sec hold  - Supine Bridge  - 1 x daily - 3-4 x weekly -  2-3 sets - 10 reps - 5 sec hold  - Bridge with Hip Abduction and Resistance - Ground Touches  - 1 x daily - 3-4 x weekly - 2-3 sets - 10 reps - 5 sec hold  - Supine Bridge with Mini Swiss Ball Between Knees  - 1 x daily - 3-4 x weekly - 2-3 sets - 10 reps - 5 sec hold      Access Code: [email or web address]  URL: [email or web address]  Date: 07/12/2024  Prepared by: Mayela Forde    Exercises  - Bent Knee Fallouts  - 1 x daily - 7 x weekly - 2 sets - 10 reps  - Swiss Ball March  - 1 x daily - 7 x weekly - 2 sets - 10 reps  - Standing 'L' Stretch at Counter  - 1 x daily - 7 x weekly - 3 sets - 30 sec hold      Access Code: [email or web address]  URL: [email or web address]  Date: 07/19/2024  Prepared by: Mayela Forde    Exercises  - Yoga Squat for Pelvic Floor Relaxation  - 1-2 x daily - 7 x weekly - 3 sets - 1-3 min hold  - Happy Baby with Pelvic Floor Lengthening  - 1-2 x daily - 7 x weekly  - Supine Butterfly Groin Stretch  - 1-2 x daily - 7 x weekly  - Cat Cow  - 1-2 x daily - 7 x weekly - 2 sets - 10 reps - 5 sec hold  - Quadruped Thoracic Rotation - Reach Under  - 1-2 x daily - 7 x weekly - 5 sec hold  - Child's Pose with Thread the Needle  - 1-2 x daily - 7 x weekly - 5 sec hold  - Sidelying Open Book Thoracic Lumbar Rotation and Extension  - 1-2 x daily - 7 x weekly - 10-15 reps - 5 sec hold    Charges: Neuro X 2, Therex X 1, Manual X 1     Total Timed Treatment: 60 min  Total Treatment Time: 60 min

## 2024-09-16 NOTE — PROGRESS NOTES
HPI:   34 year old female presets for follow up on low iron and high b12. Back in July patient was told that she had high b12 and low iron and she was told to stop taking the multi and start taking iron supplements. Patient was light headed at that time and reports that still is dizzy but not nearly as bad as she was in the beginning. Patient would like to have the blood work repeated today to check that these issues are trending in the correct direction.     Patient also had a black mold exposure at home and mold is a top allergen for her so she wants to make sure that there are no underlying issues from this exposure. Patient has had allergy symptoms for the last 3 weeks but is not sure if it is from that or from the season change. Reports that they are staying with her dad at this time and that symptoms have improved       Current Outpatient Medications   Medication Sig Dispense Refill    valACYclovir 500 MG Oral Tab Take 1 tablet (500 mg total) by mouth 2 (two) times daily.      sertraline 50 MG Oral Tab         Past Medical History:    Acute blood loss anemia    Acute sinusitis, unspecified    Acute tonsillitis    Acute tonsillitis    Anxiety    Bloating    Body piercing    Chronic tonsillitis    Constipation    Decorative tattoo    Diarrhea, unspecified    Easy bruising    Fatigue    Flatulence/gas pain/belching    Food intolerance    Frequent UTI    Heartburn    Heavy menses    High cholesterol    History of cold sores    History of mental disorder    Hypertriglyceridemia    Indigestion    Irregular bowel habits    Itch of skin    Menses painful    Nausea    Otalgia, unspecified    Other allergy, other than to medicinal agents    Pruritus, unspecified    Scoliosis (and kyphoscoliosis), idiopathic    Seasonal allergies    Sinus tachycardia    Skin blushing/flushing    Stool incontinence    Stress    Vomiting    Wears glasses    Weight gain      Past Surgical History:   Procedure Laterality Date    Colposcopy,  cervix w/upper adjacent vagina; w/biopsy(s), cervix Bilateral 01/01/2020    Tonsillectomy        Family History   Problem Relation Age of Onset    Arthritis Paternal Grandmother     Cancer Paternal Grandmother         lung, breast, and brain cancer    Breast Cancer Paternal Grandmother     Mental Disorder Paternal Grandmother     Diabetes Maternal Grandmother     Heart Disease Paternal Grandfather     Stroke Paternal Grandfather     Other (mental disability) Mother         suicide    Mental Disorder Mother     Other (mental disability) Maternal Grandfather     Heart Disorder Father     Diabetes Father     Heart Attack Father     Ovarian Cancer Maternal Aunt     Mental Disorder Sister       Social History     Socioeconomic History    Marital status:    Occupational History    Occupation:    Tobacco Use    Smoking status: Never    Smokeless tobacco: Never   Vaping Use    Vaping status: Never Used   Substance and Sexual Activity    Alcohol use: Not Currently     Comment: Very rarely    Drug use: Never    Sexual activity: Yes     Partners: Male     Birth control/protection: OCP   Other Topics Concern     Service No    Blood Transfusions No    Caffeine Concern Yes     Comment: 1 cup coffee daily    Sleep Concern No    Stress Concern No    Weight Concern No    Special Diet No    Exercise Yes     Comment: 3 x a week     Seat Belt Yes    Self-Exams Yes     Social Determinants of Health     Financial Resource Strain: Low Risk  (3/23/2024)    Financial Resource Strain     Difficulty of Paying Living Expenses: Not hard at all     Med Affordability: No   Food Insecurity: No Food Insecurity (3/23/2024)    Food Insecurity     Food Insecurity: Never true   Transportation Needs: No Transportation Needs (3/23/2024)    Transportation Needs     Lack of Transportation: No   Stress: No Stress Concern Present (3/23/2024)    Stress     Feeling of Stress : No   Housing Stability: Low Risk  (3/23/2024)     Housing Stability     Housing Instability: No         REVIEW OF SYSTEMS:   Review of Systems   Constitutional:  Negative for fatigue and fever.   HENT:  Positive for rhinorrhea and sinus pressure.    Eyes:  Positive for itching.   Respiratory:  Negative for cough, chest tightness, shortness of breath and wheezing.    Cardiovascular:  Negative for chest pain and palpitations.   Skin:  Negative for rash.   Neurological:  Positive for dizziness and light-headedness. Negative for headaches.       EXAM:   /79 (BP Location: Left arm, Patient Position: Sitting, Cuff Size: adult)   Pulse 90   Temp 97.6 °F (36.4 °C)   Resp 18   Ht 5' 2\" (1.575 m)   Wt 124 lb (56.2 kg)   LMP 08/16/2024 (Exact Date)   SpO2 99%   BMI 22.68 kg/m²   Physical Exam  Constitutional:       Appearance: Normal appearance. She is normal weight.   HENT:      Right Ear: Tympanic membrane, ear canal and external ear normal.      Left Ear: Tympanic membrane, ear canal and external ear normal.      Nose: Rhinorrhea present. No mucosal edema. Rhinorrhea is clear.      Right Turbinates: Not swollen.      Left Turbinates: Not swollen.      Mouth/Throat:      Mouth: Mucous membranes are moist.      Pharynx: Oropharynx is clear. No oropharyngeal exudate or posterior oropharyngeal erythema.   Eyes:      Conjunctiva/sclera: Conjunctivae normal.   Cardiovascular:      Rate and Rhythm: Normal rate and regular rhythm.      Heart sounds: Normal heart sounds.   Pulmonary:      Effort: Pulmonary effort is normal.      Breath sounds: Normal breath sounds. No rhonchi.   Neurological:      General: No focal deficit present.      Mental Status: She is alert and oriented to person, place, and time. Mental status is at baseline.      Motor: No weakness.   Psychiatric:         Mood and Affect: Mood normal.         Behavior: Behavior normal.         ASSESSMENT AND PLAN:   Diagnoses and all orders for this visit:    Dizziness  -     CBC W Differential W Platelet  [E]; Future  -     Ferritin [E]; Future  -     Iron And Tibc [E]; Future  -     Vitamin B12 [E]; Future  -     Comp Metabolic Panel (14) [E]; Future    Elevated vitamin B12 level  -     Vitamin B12 [E]; Future    Mold exposure  -     ENT Referral - In Network    Non-seasonal allergic rhinitis due to other allergic trigger  -     ENT Referral - In Network    Recheck lab work today  Recommend daily antihistamine and Flonase  Consider ENT evaluation    This patient was initially evaluated by Ami PALOMINO    Patient discussed with student. Independent exam performed. Agree with assessment and plan.

## 2024-09-16 NOTE — TELEPHONE ENCOUNTER
From: Judi Garnica  To: Najma Jensen  Sent: 9/15/2024 10:13 AM CDT  Subject: Black Mold Exposure    Good morning,  We recently discovered black mold in all of our vents in our home. I am worried about the potential health risks this poses and if this may have had any potential implications in my b12 and iron levels in my past blood work.     Thank you,  Judi

## 2024-09-17 NOTE — TELEPHONE ENCOUNTER
9/17/2024  1:54 PM Y Yvonne Monroe RN Patient Medical Advice Request  Lab results and recommendations     Patient viewed [employer] message

## 2024-09-17 NOTE — TELEPHONE ENCOUNTER
From: Judi Garnica  To: Palak Fletcher  Sent: 9/16/2024 8:08 PM CDT  Subject: Blood Work     Hi Dr. Fletcher,  I wanted to follow up regarding two of my blood work results. On my end, it looks like my ferritin levels are abnormally low, which is surprising to me being on the iron supplement for the past two months now. I also am wondering why my calcium levels were on the higher end.    Could you please give me more insight on this?    Thank you, Judi

## 2024-09-17 NOTE — TELEPHONE ENCOUNTER
----- Message from Palak Fletcher sent at 9/17/2024  7:21 AM CDT -----  Results reviewed.   Ferritin remains mildly low despite over the counter iron supplement. Needs to continue supplement. Recheck CBC, Ferritin in 3 months for monitoring   Not anemic  Vitamin b12 normal now  Calcium mildly elevated, does she take vitamin with calcium?

## 2024-09-17 NOTE — TELEPHONE ENCOUNTER
New consult for Low ferritin level. Patient is being referred by Dr. Jensen/Palak DUQUE to see Dr. Bardales. Patient is calling to schedule new consult appointment. Patient is reachable at [phone]. Called 9/17/24 [place]

## 2024-09-18 NOTE — TELEPHONE ENCOUNTER
Recheck calcium level in 2 weeks  If still elevated, would recommend seeing endocrinologist     Reminder placed for 2 weeks

## 2024-09-20 NOTE — TELEPHONE ENCOUNTER
Needs to  reschedule her consult on 9/27/24 @ 10:30 with Dr. Bardales her class is going on a field trip this day.

## 2024-10-01 NOTE — PROGRESS NOTES
Hematology/Oncology Initial Consultation Note    Patient Name: Judi Garnica  Medical Record Number: [id number]    YOB: 1990   Date of Consultation: 10/1/2024   PCP: Najma Jensen MD    Reason for Consultation:  Judi Garnica was seen today for the diagnosis of iron deficiency    History of Present Illness:      35 y/o F PMH iron deficiency anemia who presents to [facility] care.    - had  6 months ago, noted EBL of 600cc  - hgb nadired to 9.5  - ferritin 33.9 2024, iron sat of 15%. She started iron supplementation which is giving her GI upset; ferritin decreased to 28.1 24  - remains persistently exhausted  - denies blood in urine or stool  - has heavy periods for the first few days    Past Medical History:  Past Medical History:    Acute blood loss anemia    Acute sinusitis, unspecified    Acute tonsillitis    Acute tonsillitis    Anxiety    Bloating    Body piercing    Chronic tonsillitis    Constipation    Decorative tattoo    Diarrhea, unspecified    Easy bruising    Fatigue    Flatulence/gas pain/belching    Food intolerance    Frequent UTI    Heartburn    Heavy menses    High cholesterol    History of cold sores    History of mental disorder    Hypertriglyceridemia    Indigestion    Irregular bowel habits    Itch of skin    Menses painful    Nausea    Otalgia, unspecified    Other allergy, other than to medicinal agents    Pruritus, unspecified    Scoliosis (and kyphoscoliosis), idiopathic    Seasonal allergies    Sinus tachycardia    Skin blushing/flushing    Stool incontinence    Stress    Vomiting    Wears glasses    Weight gain       Past Surgical History:   Procedure Laterality Date    Colposcopy, cervix w/upper adjacent vagina; w/biopsy(s), cervix Bilateral 2020    Tonsillectomy         Home Medications:   Iron-Vitamin C  MG Oral Tab       sertraline 50 MG Oral Tab        -------  Current Outpatient Medications on File Prior to Visit    Medication Sig Dispense Refill    Iron-Vitamin C  MG Oral Tab       sertraline 50 MG Oral Tab       valACYclovir 500 MG Oral Tab Take 1 tablet (500 mg total) by mouth 2 (two) times daily.       No current facility-administered medications on file prior to visit.       Allergies:   Allergies   Allergen Reactions    Amoxicillin HIVES    Other OTHER (SEE COMMENTS)    Seasonal OTHER (SEE COMMENTS)             Psychosocial History:  Social History     Social History Narrative    Not on file     Social History     Socioeconomic History    Marital status:    Occupational History    Occupation:    Tobacco Use    Smoking status: Never    Smokeless tobacco: Never   Vaping Use    Vaping status: Never Used   Substance and Sexual Activity    Alcohol use: Not Currently     Comment: Very rarely    Drug use: Never    Sexual activity: Yes     Partners: Male     Birth control/protection: OCP   Other Topics Concern     Service No    Blood Transfusions No    Caffeine Concern Yes     Comment: 1 cup coffee daily    Sleep Concern No    Stress Concern No    Weight Concern No    Special Diet No    Exercise Yes     Comment: 3 x a week     Seat Belt Yes    Self-Exams Yes     Social Determinants of Health     Financial Resource Strain: Low Risk  (3/23/2024)    Financial Resource Strain     Difficulty of Paying Living Expenses: Not hard at all     Med Affordability: No   Food Insecurity: No Food Insecurity (3/23/2024)    Food Insecurity     Food Insecurity: Never true   Transportation Needs: No Transportation Needs (3/23/2024)    Transportation Needs     Lack of Transportation: No   Stress: No Stress Concern Present (3/23/2024)    Stress     Feeling of Stress : No   Housing Stability: Low Risk  (3/23/2024)    Housing Stability     Housing Instability: No       Family Medical History:  Family History   Problem Relation Age of Onset    Arthritis Paternal Grandmother     Cancer Paternal Grandmother          lung, breast, and brain cancer    Breast Cancer Paternal Grandmother     Mental Disorder Paternal Grandmother     Diabetes Maternal Grandmother     Heart Disease Paternal Grandfather     Stroke Paternal Grandfather     Other (mental disability) Mother         suicide    Mental Disorder Mother     Other (mental disability) Maternal Grandfather     Heart Disorder Father     Diabetes Father     Heart Attack Father     Ovarian Cancer Maternal Aunt     Mental Disorder Sister        Review of Systems:  A 10-point ROS was done with pertinent positives and negative per the HPI    Vital Signs:  Height: 157.5 cm (5' 2.01\") (10/01 1319)  Weight: 56.4 kg (124 lb 6.4 oz) (10/01 1319)  BSA (Calculated - sq m): 1.56 sq meters (10/01 1319)  Pulse: 88 (10/01 1319)  BP: 126/88 (10/01 1319)  Temp: 97.7 °F (36.5 °C) (10/01 1319)  Do Not Use - Resp Rate: --  SpO2: 99 % (10/01 1319)    Wt Readings from Last 6 Encounters:   10/01/24 56.4 kg (124 lb 6.4 oz)   09/16/24 56.2 kg (124 lb)   07/25/24 59.4 kg (131 lb)   07/18/24 57.9 kg (127 lb 9.6 oz)   05/30/24 59.6 kg (131 lb 6 oz)   05/06/24 59.6 kg (131 lb 6.4 oz)         Physical Examination:  General: Patient is alert and oriented, not in acute distress  Psych: Mood and affect are appropriate  Eyes: EOMI, PERRL  ENT: Oropharynx is clear, no adenopathy  CV: no LE edema  Respiratory: Non-labored respirations  GI/Abd: Soft, non-tender, no appreciable hepatosplenomegaly  Neurological: Grossly intact   Lymphatics: No palpable cervical, supraclavicular, axillary, or inguinal lymphadenopathy    Laboratory:  Lab Results   Component Value Date    WBC 10.1 09/16/2024    WBC 6.2 07/18/2024    WBC 21.9 (H) 03/26/2024    HGB 14.5 09/16/2024    HGB 14.4 07/18/2024    HGB 9.5 (L) 03/26/2024    HCT 44.3 09/16/2024    MCV 93.3 09/16/2024    MCH 30.5 09/16/2024    MCHC 32.7 09/16/2024    RDW 13.2 09/16/2024    .0 09/16/2024    .0 07/18/2024    .0 03/26/2024     Lab Results   Component  Value Date    GLU 89 2024    BUN 10 2024    BUNCREA 13.8 2021    CREATSERUM 0.73 2024    CREATSERUM 0.85 2024    CREATSERUM 0.73 2024    ANIONGAP 9 2024    GFR 97 2017    GFRNAA 96 2022    GFRAA 111 2022    CA 10.7 (H) 2024    OSMOCALC 285 2024    ALKPHO 78 2024    AST 19 2024    ALT 17 2024    BILT 0.3 2024    TP 7.9 2024    ALB 4.8 2024    GLOBULIN 3.1 2024     2024    K 4.3 2024     2024    CO2 24.0 2024     No results found for: \"PTT\", \"PT\", \"INR\"    Impression & Plan:     Iron deficiency  - iron studies consistent with iron deficiency from prior blood loss from  and menstrual bleeding  - stop oral iron supplementation given not tolerating  - with symptomatic iron deficiency, schedule 750mg IV injectafer x1, close monitoring in infusion for reaction  - discussed potential SE of hypophosphatemia, to take 1 small cup of cashews once daily for 1 week following infusion to limit SE  - repeat CBC, iron, tibc, ferritin in 3 months to reassess iron stores    Elevated calcium  - likely benign; repeat level with next blood draw    Follow up: return for IV iron if iron deficient in future    Cosmo Bardales MD  Hematology/Medical Oncology  [facility]

## 2024-10-01 NOTE — PROGRESS NOTES
Education Record    Learner:  Patient    Disease / Diagnosis:    Barriers / Limitations:  None   Comments:    Method:  Discussion   Comments:    General Topics:  Plan of care reviewed   Comments:    Outcome:  Shows understanding   Comments:    Patient in treatment center for MD mery, and iron infusion. Patient tolerated infusion without issue. Plan for patient to do follow up labs in [place] and left treatment center in stable condition.

## 2024-10-01 NOTE — PROGRESS NOTES
Education Record    Learner:  Patient    Disease / Diagnosis: Iron deficiency anemia    Barriers / Limitations:  None   Comments:    Method:  Discussion   Comments:    General Topics:  Plan of care reviewed   Comments:    Outcome:  Shows understanding   Comments:    Here for new consult- DENILSON. She is feeling exhausted. She had severe dizziness in June which improved a little bit with the iron supplement. She is having some bloating with this. Otherwise tolerating it well.

## 2024-10-02 NOTE — TELEPHONE ENCOUNTER
Repeat labs due  Mychart message sent  Future Appointments   Date Time Provider Department Center   10/7/2024  5:15 PM Mayela Forde, PT [facility] [place]   10/14/2024  6:45 PM Mayela Forde, PT [facility] [place]   10/21/2024  6:45 PM Mayela Forde, PT [facility] [place]   10/28/2024  6:45 PM Mayela Forde, PT [facility] [place]   11/4/2024  4:30 PM Mayela oFrde, PT [facility] [place]   11/11/2024  4:30 PM Mayela Forde, PT [facility] [place]

## 2024-10-06 NOTE — PROGRESS NOTES
CHIEF COMPLAINT:     Chief Complaint   Patient presents with    Ear Problem     Ear ache x 2 days  ST and congestion x 1.5 weeks       HPI:   Judi Garnica is a 34 year old female who presents to clinic today with complaints of left ear pain. Has had for 2  days. Pain is described as stabbing and throbbing.  Patient denies history of ear infections.  nothing makes ear pain worse. Home treatment includes ibuprofen and tylenol.     Associated symptoms:  Patient denies decreased hearing. Patient denies hearing loss. Patient denies drainage. Patient denies use of Q-tips to clean the ears. Patient reports nasal congestion.     Current Outpatient Medications   Medication Sig Dispense Refill    cefdinir 300 MG Oral Cap Take 1 capsule (300 mg total) by mouth 2 (two) times daily for 10 days. 20 capsule 0    Iron-Vitamin C  MG Oral Tab       valACYclovir 500 MG Oral Tab Take 1 tablet (500 mg total) by mouth 2 (two) times daily.      sertraline 50 MG Oral Tab         Past Medical History:    Acute blood loss anemia    Acute sinusitis, unspecified    Acute tonsillitis    Acute tonsillitis    Anxiety    Bloating    Body piercing    Chronic tonsillitis    Constipation    Decorative tattoo    Diarrhea, unspecified    Easy bruising    Fatigue    Flatulence/gas pain/belching    Food intolerance    Frequent UTI    Heartburn    Heavy menses    High cholesterol    History of cold sores    History of mental disorder    Hypertriglyceridemia    Indigestion    Irregular bowel habits    Itch of skin    Menses painful    Nausea    Otalgia, unspecified    Other allergy, other than to medicinal agents    Pruritus, unspecified    Scoliosis (and kyphoscoliosis), idiopathic    Seasonal allergies    Sinus tachycardia    Skin blushing/flushing    Stool incontinence    Stress    Vomiting    Wears glasses    Weight gain      Social History:  Social History     Socioeconomic History    Marital status:    Occupational  History    Occupation:    Tobacco Use    Smoking status: Never    Smokeless tobacco: Never   Vaping Use    Vaping status: Never Used   Substance and Sexual Activity    Alcohol use: Not Currently     Comment: Very rarely    Drug use: Never    Sexual activity: Yes     Partners: Male     Birth control/protection: OCP   Other Topics Concern     Service No    Blood Transfusions No    Caffeine Concern Yes     Comment: 1 cup coffee daily    Sleep Concern No    Stress Concern No    Weight Concern No    Special Diet No    Exercise Yes     Comment: 3 x a week     Seat Belt Yes    Self-Exams Yes     Social Determinants of Health     Financial Resource Strain: Low Risk  (3/23/2024)    Financial Resource Strain     Difficulty of Paying Living Expenses: Not hard at all     Med Affordability: No   Food Insecurity: No Food Insecurity (3/23/2024)    Food Insecurity     Food Insecurity: Never true   Transportation Needs: No Transportation Needs (3/23/2024)    Transportation Needs     Lack of Transportation: No   Stress: No Stress Concern Present (3/23/2024)    Stress     Feeling of Stress : No   Housing Stability: Low Risk  (3/23/2024)    Housing Stability     Housing Instability: No        REVIEW OF SYSTEMS:   GENERAL: Feeling well otherwise.    SKIN: no unusual skin lesions or rashes  HEENT: See HPI  LUNGS: No cough, shortness of breath, or wheezing.  CARDIOVASCULAR: No chest pain, palpitations  GI: No N/V/C/D.  NEURO: denies headaches or dizziness    EXAM:   /60   Pulse 83   Temp 97.9 °F (36.6 °C)   Resp 16   Ht 5' 2\" (1.575 m)   Wt 124 lb (56.2 kg)   LMP 09/22/2024 (Exact Date)   SpO2 99%   Breastfeeding No   BMI 22.68 kg/m²   GENERAL: well developed, well nourished,in no apparent distress  SKIN: no rashes,no suspicious lesions  HEAD: atraumatic, normocephalic  EYES: conjunctiva clear, EOM intact  EARS: wnl tragus no tender with manipulation.  External auditory canals wnl. Right TM:  pearly, pos bulging, no retraction,clear effusion, bony landmarks visible.  Left TM: injected, pos bulging, no retraction,cloudy effusion, bony landmarks not visible.  NOSE: nostrils patent, clear nasal mucus, nasal mucosa red and inflamed  THROAT: oral mucosa pink, moist. Posterior pharynx is not erythematous or injected. No exudates.  NECK: supple, non-tender  LUNGS: clear to auscultation bilaterally, no wheezes or rhonchi. Breathing is non labored.  CARDIO: RRR without murmur  EXTREMITIES: no cyanosis, clubbing or edema  LYMPH: no lymphadenopathy.      ASSESSMENT AND PLAN:   Judi Garnica is a 34 year old female who presents with:    ASSESSMENT:  Encounter Diagnoses   Name Primary?    Non-recurrent acute suppurative otitis media of left ear without spontaneous rupture of tympanic membrane Yes    Upper respiratory tract infection, unspecified type        PLAN: Meds as listed below.  Comfort measures as described in Patient Instructions  Educated on viral vs bacterial  Educated on abx for OM  Educated on supportive measures: ibuprofen/tylenol, hydration, zyrtec and flonase  Educated on s/s of worsening sx and when to seek higher level of care  Follow up with pcp if not improving    Meds & Refills for this Visit:  Requested Prescriptions     Signed Prescriptions Disp Refills    cefdinir 300 MG Oral Cap 20 capsule 0     Sig: Take 1 capsule (300 mg total) by mouth 2 (two) times daily for 10 days.

## 2024-10-07 NOTE — PROGRESS NOTES
Diagnosis:   Postpartum examination following  delivery (HCC) (Z39.2)  Pelvic floor weakness (N81.89)         Referring Provider: Teresa Means  Date of Evaluation:    6/3/2024    Precautions:  Drug Allergy, +HPV at 2 yrs ago pap- pt reports currently cleared Next MD visit:   none scheduled  Date of Surgery: n/a   Insurance Primary/Secondary: [employer] [place] PPO / N/A     # Auth Visits: 40 visit limit, no auth            Subjective: Pt reports mold situation is remediated. Has iron deficiency anemia & received first iron infusion. Will re-assess with blood work in 3 mo. Feels she has started glute clenching again. Doing the wand 1x/ week, does seem to help. About 5 min. More resistance on the R>L above the scar. Did a pilates class 1:1. Hoping to get back to barre classes.    Pain: 4-5/10 pinching pain to PFM, uncomfortable      Objective: See Flowsheet for details  2024:  Scar: restriction more noted on pt's R & mid vs pt's L      2024:  Strength (MMT): Hip Abduction: R 5/5, L 4+/5    Diastasis Recti: (finger width without/ with contraction)  Above Umbilicus: 2 at IE, today 0-1 without bracing; 0 with bracing  Umbilicus: 3/ 2 at IE, today 2 without bracing; 1 with bracing  Below Umbilicus: 1 at IE, 0 without bracing; 0 with bracing    Reduced depth of palpation/ increased tension at linea alba  +doming with abdominal bracing: pt still has come coordination deficit/ dyssynergy with abdominal bracing. No coning      2024:  Informed verbal consent for internal pelvic evaluation given: Yes    External Observation:  Mons pubis: WNL  Labia majora: WNL  Labia minora: WNL  Urethral meatus: WNL  Introitus: WNL  Perineal body: WNL    Sensory/Reflex:  Vestibule: normal bilaterally    Internal Palpation: WNL except Bulbocavernosus B minimal restriction and pain  Pubococcygeus/Pubovaginalis B minimal restriction and pain  Iliococcygeus B pain  Obturator Internus B minimal restriction and pain  Pelvic Clock 6  o'clock introitus moderate restriction and pain  Min restricted Clitoral Prepuce mobility, no change in symptoms      Assessment: Emphasized progression of dynamic strengthening to decrease glute clenching. Pt was appropriately challenged. Pain levels controlled through visit- no increased pain present by end of session. With sport cord shoulder extension, pt reported feeling decreased PFM activation, though did feel adequate abdominal activation.      Goals: (to be met in 16 visits)  Patient will demonstrate sufficient 360 expansion with Diaphragmatic Breathing for properly lengthening PFM for resolved urinary hesitancy. - progressing towards  Patient demonstrates increased tension at linea alba with decreased depth of palpation & reduced SARAH BETH at umbilicus from 3 to 2 finger width for improved ability to engage in ADLs. - met  Patient exhibits an increased in B hip Abduction strength from 4-/5 to at least 4+/5 to allow for increased tolerance to walking & exercise/ physical activity. - met  Patient will report reduced nocturia from 1-2x to 0-1x max for improved rest & function daytime. - met  Patient will report voids every 2-4 hours daytime for improved bowel/ bladder health & reduced urinary urgency. - met  Patient will report resolved pain with intercourse & pelvic exams. - progressing towards  Patient understands importance of performing HEP to prevent reoccurrence of symptoms. - progressing towards    Plan: HEP updated. Cont with dynamic strengthening progressions pending pt feedback next visit. May check PERF to see if pt should increase the intensity & frequency of PFM recruitment (more isolated)  Date: 7/26/2024  TX#: 8/10 Date: 8/2/2024  TX#: 9/10 Date: 8/9/2024  TX#: 10/10  Progress Note Date: 9/16/2024  TX#: 11/16 Date: 10/7/2024  TX#: 12/16   Therex: 30'  HEP: Focus on stretches, stay hydrated (connection UTI to PFD - muscle irritation)  Vitals check  Pt instructed to reach out to provider's team if pt  has any s/s yeast infection before starting any otc meds  Pt instructed to call nurse line - during visit  Pt educated in red flag symptoms that require urgent examination by medical provider (not PT)  Neuro Re-ed: 10'  Meditations for Pelvic Health (Dayana Montoya): free resource on [employer] - HEP (brief trial today)  Breathing techniques: box 4-4-4-4, 4-7-8, alternate nostril - HEP Therex: 16'  Education: PFD mimicking UTI symptoms  Seated perineal stretch with rolled towels: trial today - HEP  Manual Therapy: 24'  Internal PFM MFR: B Bulbocavernosus, B Pubococcygeus, B OI, introital stretch  -education in self-MFR introital stretch with digit for HEP as an option pending pt comfort/ tolerance  Aftercare: educated in findings; potential of normal soreness, use of ice/ heat prn pending symptoms. Neuro Re-ed: 17'  Breathing if time for 1 exercise/ time constraints  Pelvic Wand:  -pt to self-research. Demo in clinic for visual & tactile handling. Pt may decide any brand, material. May be of great benefit for supplementing PT visits & for HEP. Pt should not start using on her own without having PT instruct.  Connection to abdominals: PFM, muscle restriction  Therex: 23'  HEP update:  -Plank progressions  -Crabwalk/ side steps  -Hip abd/ ext with TheraBand  ^pt verbalized understanding  Re-assessment, goal status completion  Verbal instruction in self-performed introital stretch for HEP; pt verbalized understanding         Therex: 20'  Verbal re-assess 2/2 gap in care (see above)  DOMS  Glute work: connection to PFM  Neuro Re-ed: 30'  Pelvic Wand instruction: pt consented for verbal instruction with visual exam from PT  -verbal cueing only, pt completing  -avoid 12 o clock  -reviewed on pelvic model relevant findings  Education: promoting PFM relaxation: body cues for drop, melt, relax, let go. Connection to jaw clenching: relax shoulders, deep breath, physiologic \"sigh\"  Pelvic floor weakness & tightness connection  -may  need to increase kegels: will re-assess at subsequent visit(s)  Manual Therapy: 10'  Scar (directly to skin, gloved): fascial glide/ stretch, approximation, mobs, skin/ scar rolling  -review self-scar mobs for HEP. Educate in potential of normal soreness from Tx Therex: 28'  Verbal re-assess 2/2 gap in care (see above)  TM: CW 0.5 mph X 4'  Doorway QL stretch 2 X 30\" B  Doorway pec stretch 1 X 30\" B  Standing hip 3-way Blue TB loop X 10 ea - HEP  Sport cord paloff press X 10 ea (double green)   Unable to provide HEP handout - [facility]  Neuro Re-ed: 18'  Squat taps to chair with pelvic brace breathing coordination with hip Abd tatum (Blue TB above knee) X 10  SL RDL tap to 8 inch box with pelvic brace breathing coordination X 5 ea  Seated SB (in front of mirror): pelvic brace breathing coordination with alt LE marches X 1'  Seated SB (in front of mirror): pelvic brace breathing coordination with alt knee ext X 1'  Quadruped: alt LE raises, alt UE & LE raises with tactile cueing with cane on spine  Sport cord shld ext with pelvic brace breathing coordination X 5 (blue)   HEP: TA bracing emphasizing drawing in & zipping, standing posture correction avoiding coning & anterior pelvis  Access Code: [email or web address]  URL: [email or web address]  Date: 06/03/2024  Prepared by: Mayela Forde    Exercises  - Supine Transversus Abdominis Bracing - Hands on Stomach  - 1 x daily - 7 x weekly - 2-3 sec hold - 2-3 minutes    6/14/2024: Log Rolling with bed mobility, breathing with ADLs \"exhale with exertion\"    6/21/2024: Coordinating PFM with Breathing, Pelvic Brace    6/24/2024: Pelvic Floor Isolation Exercises  Access Code: [email or web address]  URL: [email or web address]  Date: 06/24/2024  Prepared by: Mayela Forde    Exercises  - Seated Transversus Abdominis Bracing  - 1 x daily - 7 x weekly - 3 sets - 10 reps  - Quadruped Transversus Abdominis Bracing  - 1 x daily - 7 x weekly - 3 sets - 10 reps  - Abdominal  Press into Ball  - 1 x daily - 7 x weekly - 3 sets - 10 reps  - Seated Abdominal Press into Swiss Ball  - 1 x daily - 7 x weekly - 3 sets - 10 reps  - Posterior Pelvic Tilt with Adduction Using Pillow in Hooklying  - 1 x daily - 7 x weekly - 3 sets - 10 reps  - Pelvic Tilt on Swiss Ball  - 1 x daily - 7 x weekly - 3 sets - 10 reps  - Seated Lateral Pelvic Tilt on Swiss Ball  - 1 x daily - 7 x weekly - 3 sets - 10 reps  - Pelvic Circles on Swiss Ball  - 1 x daily - 7 x weekly - 3 sets - 10 reps  - Supine March with Posterior Pelvic Tilt  - 1 x daily - 7 x weekly - 3 sets - 10 reps      Access Code: [email or web address]  URL: [email or web address]  Date: 07/05/2024  Prepared by: Mayela Forde    Exercises  - Hooklying Clamshell with Resistance  - 1 x daily - 3-4 x weekly - 1-2 sets - 10 reps - 10 sec hold  - Clamshell  - 1 x daily - 3-4 x weekly - 2-3 sets - 10 reps  - Sidelying Reverse Clamshell  - 1 x daily - 3-4 x weekly - 2-3 sets - 10 reps  - Sidelying Hip Abduction  - 1 x daily - 3-4 x weekly - 2-3 sets - 10 reps - 1-2 sec hold  - Supine Bridge  - 1 x daily - 3-4 x weekly - 2-3 sets - 10 reps - 5 sec hold  - Bridge with Hip Abduction and Resistance - Ground Touches  - 1 x daily - 3-4 x weekly - 2-3 sets - 10 reps - 5 sec hold  - Supine Bridge with Mini Swiss Ball Between Knees  - 1 x daily - 3-4 x weekly - 2-3 sets - 10 reps - 5 sec hold      Access Code: [email or web address]  URL: [email or web address]  Date: 07/12/2024  Prepared by: Mayela Forde    Exercises  - Bent Knee Fallouts  - 1 x daily - 7 x weekly - 2 sets - 10 reps  - Swiss Ball March  - 1 x daily - 7 x weekly - 2 sets - 10 reps  - Standing 'L' Stretch at Counter  - 1 x daily - 7 x weekly - 3 sets - 30 sec hold      Access Code: [email or web address]  URL: [email or web address]  Date: 07/19/2024  Prepared by: Mayela Forde    Exercises  - Yoga Squat for Pelvic Floor Relaxation  - 1-2 x daily - 7 x weekly - 3 sets - 1-3 min hold  - Happy  Baby with Pelvic Floor Lengthening  - 1-2 x daily - 7 x weekly  - Supine Butterfly Groin Stretch  - 1-2 x daily - 7 x weekly  - Cat Cow  - 1-2 x daily - 7 x weekly - 2 sets - 10 reps - 5 sec hold  - Quadruped Thoracic Rotation - Reach Under  - 1-2 x daily - 7 x weekly - 5 sec hold  - Child's Pose with Thread the Needle  - 1-2 x daily - 7 x weekly - 5 sec hold  - Sidelying Open Book Thoracic Lumbar Rotation and Extension  - 1-2 x daily - 7 x weekly - 10-15 reps - 5 sec hold    Charges: Neuro X 1, Therex X 2     Total Timed Treatment: 46 min  Total Treatment Time: 46 min

## 2024-10-14 NOTE — TELEPHONE ENCOUNTER
10/14/2024  1:09 PM LANDON Monroe RN Patient Medical Advice Request  Lab results     Patient viewed [employer] message

## 2024-10-14 NOTE — TELEPHONE ENCOUNTER
----- Message from Palak Fletcher sent at 10/14/2024 12:54 PM CDT -----  Results reviewed.   Normal Vitamin B12 level  Calcium level has normalized

## 2024-10-21 NOTE — PROGRESS NOTES
Diagnosis:   Postpartum examination following  delivery (HCC) (Z39.2)  Pelvic floor weakness (N81.89)         Referring Provider: Teresa Means  Date of Evaluation:    6/3/2024    Precautions:  Drug Allergy, +HPV at 2 yrs ago pap- pt reports currently cleared Next MD visit:   none scheduled  Date of Surgery: n/a   Insurance Primary/Secondary: [employer] [place] PPO / N/A     # Auth Visits: 40 visit limit, no auth            Subjective: Pt reports completed first barre3 class (in 15 mo) with modifications: no issues. Soreness but no pain, no leakage. Lightheadedness came back last week: had felt normal after iron infusion; reports symptoms are not due to iron deficiency as per physician. Some brain fog, though room not spinning. No recent leakage or urgency. Scar is ok- feels some pulling, resistance in the abdomen- scar tissue? Some initial pain with initial penetration with intercourse but then ok    Pain: 0/10; PFM discomfort      Objective: See Flowsheet for details  10/21/2024:  Diastasis Recti: (finger width without/ with contraction)  Above Umbilicus: 2 at IE, today 0-1 without bracing; 0 with bracing  Umbilicus: 3/ 2 at IE, today 1-2 without bracing; 1 with bracing  Below Umbilicus: 1 at IE, 0 without bracing; 0 with bracing    Reduced depth of palpation/ increased tension at linea alba  +doming with abdominal bracing: pt still has come coordination deficit/ dyssynergy with abdominal bracing. No coning    Scar: restriction more noted on pt's R & mid vs pt's L, restricted fascial glide      Assessment: Pt has functionally improved & is progressing towards discharge readiness. Pt has improved form with pelvic brace when completing in smaller intensity; with larger intensity tends to initiate movement from upper abs.      Goals: (to be met in 16 visits)  Patient will demonstrate sufficient 360 expansion with Diaphragmatic Breathing for properly lengthening PFM for resolved urinary hesitancy. - progressing  towards  Patient demonstrates increased tension at linea alba with decreased depth of palpation & reduced SARAH BETH at umbilicus from 3 to 2 finger width for improved ability to engage in ADLs. - met  Patient exhibits an increased in B hip Abduction strength from 4-/5 to at least 4+/5 to allow for increased tolerance to walking & exercise/ physical activity. - met  Patient will report reduced nocturia from 1-2x to 0-1x max for improved rest & function daytime. - met  Patient will report voids every 2-4 hours daytime for improved bowel/ bladder health & reduced urinary urgency. - met  Patient will report resolved pain with intercourse & pelvic exams. - progressing towards  Patient understands importance of performing HEP to prevent reoccurrence of symptoms. - progressing towards    Plan: Approaching discharge.  Date: 7/26/2024  TX#: 8/10 Date: 8/2/2024  TX#: 9/10 Date: 8/9/2024  TX#: 10/10  Progress Note Date: 9/16/2024  TX#: 11/16 Date: 10/7/2024  TX#: 12/16 Date: 10/21/2024  TX#: 13/16   Therex: 30'  HEP: Focus on stretches, stay hydrated (connection UTI to PFD - muscle irritation)  Vitals check  Pt instructed to reach out to provider's team if pt has any s/s yeast infection before starting any otc meds  Pt instructed to call nurse line - during visit  Pt educated in red flag symptoms that require urgent examination by medical provider (not PT)  Neuro Re-ed: 10'  Meditations for Pelvic Health (Dayana Montoya): free resource on YouTube - HEP (brief trial today)  Breathing techniques: box 4-4-4-4, 4-7-8, alternate nostril - HEP Therex: 16'  Education: PFD mimicking UTI symptoms  Seated perineal stretch with rolled towels: trial today - HEP  Manual Therapy: 24'  Internal PFM MFR: B Bulbocavernosus, B Pubococcygeus, B OI, introital stretch  -education in self-MFR introital stretch with digit for HEP as an option pending pt comfort/ tolerance  Aftercare: educated in findings; potential of normal soreness, use of ice/ heat prn  pending symptoms. Neuro Re-ed: 17'  Breathing if time for 1 exercise/ time constraints  Pelvic Wand:  -pt to self-research. Demo in clinic for visual & tactile handling. Pt may decide any brand, material. May be of great benefit for supplementing PT visits & for HEP. Pt should not start using on her own without having PT instruct.  Connection to abdominals: PFM, muscle restriction  Therex: 23'  HEP update:  -Plank progressions  -Crabwalk/ side steps  -Hip abd/ ext with TheraBand  ^pt verbalized understanding  Re-assessment, goal status completion  Verbal instruction in self-performed introital stretch for HEP; pt verbalized understanding         Therex: 20'  Verbal re-assess 2/2 gap in care (see above)  DOMS  Glute work: connection to PFM  Neuro Re-ed: 30'  Pelvic Wand instruction: pt consented for verbal instruction with visual exam from PT  -verbal cueing only, pt completing  -avoid 12 o clock  -reviewed on pelvic model relevant findings  Education: promoting PFM relaxation: body cues for drop, melt, relax, let go. Connection to jaw clenching: relax shoulders, deep breath, physiologic \"sigh\"  Pelvic floor weakness & tightness connection  -may need to increase kegels: will re-assess at subsequent visit(s)  Manual Therapy: 10'  Scar (directly to skin, gloved): fascial glide/ stretch, approximation, mobs, skin/ scar rolling  -review self-scar mobs for HEP. Educate in potential of normal soreness from Tx Therex: 28'  Verbal re-assess 2/2 gap in care (see above)  TM: CW 0.5 mph X 4'  Doorway QL stretch 2 X 30\" B  Doorway pec stretch 1 X 30\" B  Standing hip 3-way Blue TB loop X 10 ea - HEP  Sport cord paloff press X 10 ea (double green)   Unable to provide HEP handout - [employer]  Neuro Re-ed: 18'  Squat taps to chair with pelvic brace breathing coordination with hip Abd tatum (Blue TB above knee) X 10  SL RDL tap to 8 inch box with pelvic brace breathing coordination X 5 ea  Seated SB (in front of mirror): pelvic  brace breathing coordination with alt LE marches X 1'  Seated SB (in front of mirror): pelvic brace breathing coordination with alt knee ext X 1'  Quadruped: alt LE raises, alt UE & LE raises with tactile cueing with cane on spine  Sport cord shld ext with pelvic brace breathing coordination X 5 (blue) Therex: 12'  Verbal re-assess  HEP with independent strengthening: goal to incorporate PT exercises into self-exercise for long-term adherence  Manual Therapy: 15'  Scar mob including skin rolling, fascial stretch (use of emollient)  Neuro Re-ed: 18'  SARAH BETH check  Cueing for proper pelvic brace: TA activation: zipper  Pelvic wand- clock stretches- decrease tension (before intercourse)  TA bracing with SB in hooklying: HEP   HEP: TA bracing emphasizing drawing in & zipping, standing posture correction avoiding coning & anterior pelvis  Access Code: [email or web address]  URL: [email or web address]  Date: 06/03/2024  Prepared by: Mayela Forde    Exercises  - Supine Transversus Abdominis Bracing - Hands on Stomach  - 1 x daily - 7 x weekly - 2-3 sec hold - 2-3 minutes    6/14/2024: Log Rolling with bed mobility, breathing with ADLs \"exhale with exertion\"    6/21/2024: Coordinating PFM with Breathing, Pelvic Brace    6/24/2024: Pelvic Floor Isolation Exercises  Access Code: [email or web address]  URL: [email or web address]  Date: 06/24/2024  Prepared by: Mayela Forde    Exercises  - Seated Transversus Abdominis Bracing  - 1 x daily - 7 x weekly - 3 sets - 10 reps  - Quadruped Transversus Abdominis Bracing  - 1 x daily - 7 x weekly - 3 sets - 10 reps  - Abdominal Press into Ball  - 1 x daily - 7 x weekly - 3 sets - 10 reps  - Seated Abdominal Press into Swiss Ball  - 1 x daily - 7 x weekly - 3 sets - 10 reps  - Posterior Pelvic Tilt with Adduction Using Pillow in Hooklying  - 1 x daily - 7 x weekly - 3 sets - 10 reps  - Pelvic Tilt on Swiss Ball  - 1 x daily - 7 x weekly - 3 sets - 10 reps  - Seated Lateral Pelvic  Tilt on Swiss Ball  - 1 x daily - 7 x weekly - 3 sets - 10 reps  - Pelvic Circles on Swiss Ball  - 1 x daily - 7 x weekly - 3 sets - 10 reps  - Supine March with Posterior Pelvic Tilt  - 1 x daily - 7 x weekly - 3 sets - 10 reps      Access Code: [email or web address]  URL: [email or web address]  Date: 07/05/2024  Prepared by: Mayela Forde    Exercises  - Hooklying Clamshell with Resistance  - 1 x daily - 3-4 x weekly - 1-2 sets - 10 reps - 10 sec hold  - Clamshell  - 1 x daily - 3-4 x weekly - 2-3 sets - 10 reps  - Sidelying Reverse Clamshell  - 1 x daily - 3-4 x weekly - 2-3 sets - 10 reps  - Sidelying Hip Abduction  - 1 x daily - 3-4 x weekly - 2-3 sets - 10 reps - 1-2 sec hold  - Supine Bridge  - 1 x daily - 3-4 x weekly - 2-3 sets - 10 reps - 5 sec hold  - Bridge with Hip Abduction and Resistance - Ground Touches  - 1 x daily - 3-4 x weekly - 2-3 sets - 10 reps - 5 sec hold  - Supine Bridge with Mini Swiss Ball Between Knees  - 1 x daily - 3-4 x weekly - 2-3 sets - 10 reps - 5 sec hold      Access Code: [email or web address]  URL: [email or web address]  Date: 07/12/2024  Prepared by: Mayela Forde    Exercises  - Bent Knee Fallouts  - 1 x daily - 7 x weekly - 2 sets - 10 reps  - Swiss Ball March  - 1 x daily - 7 x weekly - 2 sets - 10 reps  - Standing 'L' Stretch at Counter  - 1 x daily - 7 x weekly - 3 sets - 30 sec hold      Access Code: [email or web address]  URL: [email or web address]  Date: 07/19/2024  Prepared by: Mayela Forde    Exercises  - Yoga Squat for Pelvic Floor Relaxation  - 1-2 x daily - 7 x weekly - 3 sets - 1-3 min hold  - Happy Baby with Pelvic Floor Lengthening  - 1-2 x daily - 7 x weekly  - Supine Butterfly Groin Stretch  - 1-2 x daily - 7 x weekly  - Cat Cow  - 1-2 x daily - 7 x weekly - 2 sets - 10 reps - 5 sec hold  - Quadruped Thoracic Rotation - Reach Under  - 1-2 x daily - 7 x weekly - 5 sec hold  - Child's Pose with Thread the Needle  - 1-2 x daily - 7 x weekly - 5 sec  hold  - Sidelying Open Book Thoracic Lumbar Rotation and Extension  - 1-2 x daily - 7 x weekly - 10-15 reps - 5 sec hold    Charges: Neuro X 1, Therex X 1, Manual X 1     Total Timed Treatment: 45 min  Total Treatment Time: 45 min

## 2024-11-04 NOTE — PROGRESS NOTES
Diagnosis:   Postpartum examination following  delivery (HCC) (Z39.2)  Pelvic floor weakness (N81.89)         Referring Provider: Teresa Means  Date of Evaluation:    6/3/2024    Precautions:  Drug Allergy, +HPV at 2 yrs ago pap- pt reports currently cleared Next MD visit:   none scheduled  Date of Surgery: n/a   Insurance Primary/Secondary: [employer] [place] / N/A     # Auth Visits: 40 visit limit, no auth             Discharge Summary  Pt has attended 14 visits in Physical Therapy.     Subjective: Pt reports getting better with lower ab activation: practicing in different positions. Cont with LBP - stretching is helping, will sometimes get a shooting nerve pain about a month or so duration with lifting (points to upper thoracic/ lower cervical). First time yesterday last night did not have heaviness to PFM. Cont to have some scar restriction still. Going to [facility]re tomorrow  Pt reports at least 80% improvement    Pain: 0/10      Objective: See Flowsheet for details  2024:  PFDI-20: 8.33/300; Impairment= 2.78 %  (Two results: had to modify answers with pt to ensure accuracy per pt misunderstanding on scoring with first attempt with QNR)  PFDI 20    Scores   POPDI 6:   8.33 29.17   CRAD 8:   0 25   OWEN 6:   0 25   Summary:   8.33 79.17        PFDI-20: 75/300; Impairment= 25 %  PFDI 20    Scores   POPDI 6:    33.33   CRAD 8:    0   OWEN 6:    41.67   Summary:    75      Diastasis Recti: (finger width without/ with contraction)  Umbilicus: 3/ 2 at IE, today 1-2 without bracing; 1 with bracing    Reduced depth of palpation/ increased tension at linea alba with no coning    Scar: restriction more noted on pt's R & mid vs pt's L, mild restricted fascial glide        10/21/2024:  Diastasis Recti: (finger width without/ with contraction)  Above Umbilicus: 2 at IE, today 0-1 without bracing; 0 with bracing  Umbilicus: 3/ 2 at IE, today 1-2 without bracing; 1 with bracing  Below Umbilicus: 1 at IE, 0 without bracing; 0  with bracing    Reduced depth of palpation/ increased tension at linea alba  +doming with abdominal bracing: pt still has come coordination deficit/ dyssynergy with abdominal bracing. No coning    Scar: restriction more noted on pt's R & mid vs pt's L, restricted fascial glide      Assessment: Pt has met all therapy goals. Pt reports readiness for discharge & PT is in agreement. Pt has met all therapy goals. Pt verbalizes understanding w/ HEP & ability to self-alter as appropriate. Pt advised to contact PT if questions/ concerns arise while performing HEP. Pt advised to follow up with referring provider and/ or PCP if symptoms increase despite adherence to HEP. Pt is aware that if symptoms recur or increase, pt may be appropriate to return to skilled PT under new POC w/ updated RX if deemed medically necessary. Pt is in agreement with discharge plans. Thank you.      Goals: (to be met in 16 visits)  Patient will demonstrate sufficient 360 expansion with Diaphragmatic Breathing for properly lengthening PFM for resolved urinary hesitancy. - met  Patient demonstrates increased tension at linea alba with decreased depth of palpation & reduced SARAH BETH at umbilicus from 3 to 2 finger width for improved ability to engage in ADLs. - met  Patient exhibits an increased in B hip Abduction strength from 4-/5 to at least 4+/5 to allow for increased tolerance to walking & exercise/ physical activity. - met  Patient will report reduced nocturia from 1-2x to 0-1x max for improved rest & function daytime. - met  Patient will report voids every 2-4 hours daytime for improved bowel/ bladder health & reduced urinary urgency. - met  Patient will report resolved pain with intercourse & pelvic exams. - met  Patient understands importance of performing HEP to prevent reoccurrence of symptoms. - met    Plan: Discharge to HEP.  Patient/Family/Caregiver was advised of these findings, precautions, and treatment options and has agreed to actively  participate in planning and for this course of care.    Thank you for your referral. If you have any questions, please contact me at Dept: [phone].    Sincerely,  Electronically signed by therapist: Mayela Forde, PT     Physician's certification required:  No  Please co-sign or sign and return this letter via fax as soon as possible to [phone].   I certify the need for these services furnished under this plan of treatment and while under my care.    X___________________________________________________ Date____________________    Certification From: 11/4/2024  To:2/2/2025     Date: 7/26/2024  TX#: 8/10 Date: 8/2/2024  TX#: 9/10 Date: 8/9/2024  TX#: 10/10  Progress Note Date: 9/16/2024  TX#: 11/16 Date: 10/7/2024  TX#: 12/16 Date: 10/21/2024  TX#: 13/16 Date: 11/4/2024  TX#: 14/16  Discharge   Therex: 30'  HEP: Focus on stretches, stay hydrated (connection UTI to PFD - muscle irritation)  Vitals check  Pt instructed to reach out to provider's team if pt has any s/s yeast infection before starting any otc meds  Pt instructed to call nurse line - during visit  Pt educated in red flag symptoms that require urgent examination by medical provider (not PT)  Neuro Re-ed: 10'  Meditations for Pelvic Health (Dayana Jan): free resource on YouTube - HEP (brief trial today)  Breathing techniques: box 4-4-4-4, 4-7-8, alternate nostril - HEP Therex: 16'  Education: PFD mimicking UTI symptoms  Seated perineal stretch with rolled towels: trial today - HEP  Manual Therapy: 24'  Internal PFM MFR: B Bulbocavernosus, B Pubococcygeus, B OI, introital stretch  -education in self-MFR introital stretch with digit for HEP as an option pending pt comfort/ tolerance  Aftercare: educated in findings; potential of normal soreness, use of ice/ heat prn pending symptoms. Neuro Re-ed: 17'  Breathing if time for 1 exercise/ time constraints  Pelvic Wand:  -pt to self-research. Demo in clinic for visual & tactile handling. Pt may decide any  brand, material. May be of great benefit for supplementing PT visits & for HEP. Pt should not start using on her own without having PT instruct.  Connection to abdominals: PFM, muscle restriction  Therex: 23'  HEP update:  -Plank progressions  -Crabwalk/ side steps  -Hip abd/ ext with TheraBand  ^pt verbalized understanding  Re-assessment, goal status completion  Verbal instruction in self-performed introital stretch for HEP; pt verbalized understanding         Therex: 20'  Verbal re-assess 2/2 gap in care (see above)  DOMS  Glute work: connection to PFM  Neuro Re-ed: 30'  Pelvic Wand instruction: pt consented for verbal instruction with visual exam from PT  -verbal cueing only, pt completing  -avoid 12 o clock  -reviewed on pelvic model relevant findings  Education: promoting PFM relaxation: body cues for drop, melt, relax, let go. Connection to jaw clenching: relax shoulders, deep breath, physiologic \"sigh\"  Pelvic floor weakness & tightness connection  -may need to increase kegels: will re-assess at subsequent visit(s)  Manual Therapy: 10'  Scar (directly to skin, gloved): fascial glide/ stretch, approximation, mobs, skin/ scar rolling  -review self-scar mobs for HEP. Educate in potential of normal soreness from Tx Therex: 28'  Verbal re-assess 2/2 gap in care (see above)  TM: CW 0.5 mph X 4'  Doorway QL stretch 2 X 30\" B  Doorway pec stretch 1 X 30\" B  Standing hip 3-way Blue TB loop X 10 ea - HEP  Sport cord paloff press X 10 ea (double green)   Unable to provide HEP handout - [employer] down  Neuro Re-ed: 18'  Squat taps to chair with pelvic brace breathing coordination with hip Abd tatum (Blue TB above knee) X 10  SL RDL tap to 8 inch box with pelvic brace breathing coordination X 5 ea  Seated SB (in front of mirror): pelvic brace breathing coordination with alt LE marches X 1'  Seated SB (in front of mirror): pelvic brace breathing coordination with alt knee ext X 1'  Quadruped: alt LE raises, alt UE & LE  raises with tactile cueing with cane on spine  Sport cord shld ext with pelvic brace breathing coordination X 5 (blue) Therex: 12'  Verbal re-assess  HEP with independent strengthening: goal to incorporate PT exercises into self-exercise for long-term adherence  Manual Therapy: 15'  Scar mob including skin rolling, fascial stretch (use of emollient)  Neuro Re-ed: 18'  SARAH BETH check  Cueing for proper pelvic brace: TA activation: zipper  Pelvic wand- clock stretches- decrease tension (before intercourse)  TA bracing with SB in hooklying: HEP Therex: 45'  Discharge planning/ discharge instructions  Re-assessment including PFDI-20 survey with education in results  Posture connection to PFD: direct vs indirect  HEP review/ update:  -scar mobs: ok to trial gentle scar fascial stretch (avoid direct work to abdomen)  -posture work: trial of rows & row with rotation with gray sport cord (including option for SLB)  -prone trial: progress to YESSICA & prone press up (for abdominal/ fascial stretch)  -standing lumbar ext if unable to do prone work  Neuro Re-ed: 20'  SARAH BETH check  Cueing for proper pelvic brace: TA activation: zipper  Education in goals, exercise progressions with SARAH BETH. Healing times, norms. Depth vs width  Standing posture: avoiding glute clenching, knee hyperext, relying on hip ligaments   HEP: TA bracing emphasizing drawing in & zipping, standing posture correction avoiding coning & anterior pelvis  Access Code: [email or web address]  URL: [email or web address]  Date: 06/03/2024  Prepared by: Mayela Forde    Exercises  - Supine Transversus Abdominis Bracing - Hands on Stomach  - 1 x daily - 7 x weekly - 2-3 sec hold - 2-3 minutes    6/14/2024: Log Rolling with bed mobility, breathing with ADLs \"exhale with exertion\"    6/21/2024: Coordinating PFM with Breathing, Pelvic Brace    6/24/2024: Pelvic Floor Isolation Exercises  Access Code: [email or web address]  URL: [email or web address]  Date:  06/24/2024  Prepared by: Mayela Forde    Exercises  - Seated Transversus Abdominis Bracing  - 1 x daily - 7 x weekly - 3 sets - 10 reps  - Quadruped Transversus Abdominis Bracing  - 1 x daily - 7 x weekly - 3 sets - 10 reps  - Abdominal Press into Ball  - 1 x daily - 7 x weekly - 3 sets - 10 reps  - Seated Abdominal Press into Swiss Ball  - 1 x daily - 7 x weekly - 3 sets - 10 reps  - Posterior Pelvic Tilt with Adduction Using Pillow in Hooklying  - 1 x daily - 7 x weekly - 3 sets - 10 reps  - Pelvic Tilt on Swiss Ball  - 1 x daily - 7 x weekly - 3 sets - 10 reps  - Seated Lateral Pelvic Tilt on Swiss Ball  - 1 x daily - 7 x weekly - 3 sets - 10 reps  - Pelvic Circles on Swiss Ball  - 1 x daily - 7 x weekly - 3 sets - 10 reps  - Supine March with Posterior Pelvic Tilt  - 1 x daily - 7 x weekly - 3 sets - 10 reps      Access Code: [email or web address]  URL: [email or web address]  Date: 07/05/2024  Prepared by: Mayela Forde    Exercises  - Hooklying Clamshell with Resistance  - 1 x daily - 3-4 x weekly - 1-2 sets - 10 reps - 10 sec hold  - Clamshell  - 1 x daily - 3-4 x weekly - 2-3 sets - 10 reps  - Sidelying Reverse Clamshell  - 1 x daily - 3-4 x weekly - 2-3 sets - 10 reps  - Sidelying Hip Abduction  - 1 x daily - 3-4 x weekly - 2-3 sets - 10 reps - 1-2 sec hold  - Supine Bridge  - 1 x daily - 3-4 x weekly - 2-3 sets - 10 reps - 5 sec hold  - Bridge with Hip Abduction and Resistance - Ground Touches  - 1 x daily - 3-4 x weekly - 2-3 sets - 10 reps - 5 sec hold  - Supine Bridge with Mini Swiss Ball Between Knees  - 1 x daily - 3-4 x weekly - 2-3 sets - 10 reps - 5 sec hold      Access Code: [email or web address]  URL: [email or web address]  Date: 07/12/2024  Prepared by: Mayela Forde    Exercises  - Bent Knee Fallouts  - 1 x daily - 7 x weekly - 2 sets - 10 reps  - Swiss Ball March  - 1 x daily - 7 x weekly - 2 sets - 10 reps  - Standing 'L' Stretch at Counter  - 1 x daily - 7 x weekly - 3 sets - 30 sec  hold      Access Code: [email or web address]  URL: [email or web address]  Date: 07/19/2024  Prepared by: Mayela Forde    Exercises  - Yoga Squat for Pelvic Floor Relaxation  - 1-2 x daily - 7 x weekly - 3 sets - 1-3 min hold  - Happy Baby with Pelvic Floor Lengthening  - 1-2 x daily - 7 x weekly  - Supine Butterfly Groin Stretch  - 1-2 x daily - 7 x weekly  - Cat Cow  - 1-2 x daily - 7 x weekly - 2 sets - 10 reps - 5 sec hold  - Quadruped Thoracic Rotation - Reach Under  - 1-2 x daily - 7 x weekly - 5 sec hold  - Child's Pose with Thread the Needle  - 1-2 x daily - 7 x weekly - 5 sec hold  - Sidelying Open Book Thoracic Lumbar Rotation and Extension  - 1-2 x daily - 7 x weekly - 10-15 reps - 5 sec hold    Charges: Neuro X 1, Therex X 3     Total Timed Treatment: 65 min  Total Treatment Time: 65 min

## 2024-11-30 NOTE — PROGRESS NOTES
CHIEF COMPLAINT:     Chief Complaint   Patient presents with    Eye Problem     Red itchy eyes for over a week, crusty in the morning, eye drops not working. - Entered by patient       HPI:   Judi Garnica is a 34 year old female who presents with chief complaint of  red itchy eyes now with crusting in the lashes this morning.   Symptoms began  1  weeks ago. Symptoms have been worse since onset.   Patient reports + eye redness, + itching, + eyelid crusting.  This morning she needed to use a washcloth to separate her eyelashes.  Denies photophobia, maxine eye pain, or change in visual acuity.   Denies fever or current cold symptoms, or contact with irritant.  no contact use or other ocular history.  Treatments tried: she has been using antihistamine eyedrops without relief.          Current Outpatient Medications   Medication Sig Dispense Refill    tobramycin 0.3 % Ophthalmic Solution 1-2 gtts in affected eye(s) q 4 hr for 5 days 5 mL 0    sertraline 50 MG Oral Tab       valACYclovir 500 MG Oral Tab Take 1 tablet (500 mg total) by mouth 2 (two) times daily. (Patient not taking: Reported on 11/30/2024)        Past Medical History:    Acute blood loss anemia    Acute sinusitis, unspecified    Acute tonsillitis    Acute tonsillitis    Anxiety    Bloating    Body piercing    Chronic tonsillitis    Constipation    Decorative tattoo    Diarrhea, unspecified    Easy bruising    Fatigue    Flatulence/gas pain/belching    Food intolerance    Frequent UTI    Heartburn    Heavy menses    High cholesterol    History of cold sores    History of mental disorder    Hypertriglyceridemia    Indigestion    Irregular bowel habits    Itch of skin    Menses painful    Nausea    Otalgia, unspecified    Other allergy, other than to medicinal agents    Pruritus, unspecified    Scoliosis (and kyphoscoliosis), idiopathic    Seasonal allergies    Sinus tachycardia    Skin blushing/flushing    Stool incontinence    Stress     Vomiting    Wears glasses    Weight gain      Past Surgical History:   Procedure Laterality Date    Colposcopy, cervix w/upper adjacent vagina; w/biopsy(s), cervix Bilateral 01/01/2020    Tonsillectomy        Family History   Problem Relation Age of Onset    Arthritis Paternal Grandmother     Cancer Paternal Grandmother         lung, breast, and brain cancer    Breast Cancer Paternal Grandmother     Mental Disorder Paternal Grandmother     Diabetes Maternal Grandmother     Heart Disease Paternal Grandfather     Stroke Paternal Grandfather     Other (mental disability) Mother         suicide    Mental Disorder Mother     Other (mental disability) Maternal Grandfather     Heart Disorder Father     Diabetes Father     Heart Attack Father     Ovarian Cancer Maternal Aunt     Mental Disorder Sister       Social History     Socioeconomic History    Marital status:    Occupational History    Occupation:    Tobacco Use    Smoking status: Never    Smokeless tobacco: Never   Vaping Use    Vaping status: Never Used   Substance and Sexual Activity    Alcohol use: Not Currently     Comment: Very rarely    Drug use: Never    Sexual activity: Yes     Partners: Male     Birth control/protection: OCP   Other Topics Concern     Service No    Blood Transfusions No    Caffeine Concern Yes     Comment: 1 cup coffee daily    Sleep Concern No    Stress Concern No    Weight Concern No    Special Diet No    Exercise Yes     Comment: 3 x a week     Seat Belt Yes    Self-Exams Yes     Social Drivers of Health     Financial Resource Strain: Low Risk  (3/23/2024)    Financial Resource Strain     Difficulty of Paying Living Expenses: Not hard at all     Med Affordability: No   Food Insecurity: No Food Insecurity (3/23/2024)    Food Insecurity     Food Insecurity: Never true   Transportation Needs: No Transportation Needs (3/23/2024)    Transportation Needs     Lack of Transportation: No   Stress: No Stress  Concern Present (3/23/2024)    Stress     Feeling of Stress : No   Housing Stability: Low Risk  (3/23/2024)    Housing Stability     Housing Instability: No         REVIEW OF SYSTEMS:   GENERAL: feels well otherwise  SKIN: no rashes  EYES: See HPI  HENT: denies ear pain, congestion, sore throat  LUNGS: denies shortness of breath or cough  CARDIOVASCULAR: denies chest pain or palpitations   GI: denies N/V/C or abdominal pain  NEURO: denies headaches     EXAM:   /82   Pulse 86   Temp 97.5 °F (36.4 °C)   Resp 18   Ht 5' 2\" (1.575 m)   Wt 123 lb (55.8 kg)   LMP 2024 (Exact Date)   SpO2 98%   BMI 22.50 kg/m²   GENERAL: well developed, well nourished,in no apparent distress  SKIN: no rashes,no suspicious lesions  EYES: PERRLA, EOMI, mild conjunctival erythema and  injection medially in both eyes.  No active discharge.  No foca lid lesion or swelling.  intact vision to  chart. snellen  HENT: atraumatic, normocephalic,ears and throat are clear  NECK: supple, non tender  LUNGS: clear to auscultation bilaterally.   CARDIO: RRR without murmur  LYMPH: no periauricular lymphadenopathy. No cervical lymphadenopathy      ASSESSMENT AND PLAN:   Judi Garnica is a 34 year old female who presents with:    ASSESSMENT:   Encounter Diagnosis   Name Primary?    Conjunctivitis of both eyes, unspecified conjunctivitis type Yes       PLAN:  since she failed antihistamin eye drops will try antibiotic drops and see how she does.  If not improving try lubricating drop for potential dry eye follow up with eye doctor. Hygiene and comfort care as listen in patient instructions.  Medication as listed below     Requested Prescriptions     Signed Prescriptions Disp Refills    tobramycin 0.3 % Ophthalmic Solution 5 mL 0     Si-2 gtts in affected eye(s) q 4 hr for 5 days       Risks, benefits, complications and side effects of meds discussed.    Contagion measures reviewed.    Warm compresses to affected eye  prn.    Call with developing eye pain, photophobia, VA changes.    Can return to work/school after on medication for 24 hours.    Call or return if not improved in 2-3 days.    The patient is asked to follow up with their PCP if not improving over the next 2-3 days.

## 2024-12-02 NOTE — PROGRESS NOTES
Here for Routine Annual Exam    Notes she has done a lot of work with pelvic floor PT for her diastasis rectus muscle separation. Her mid muscles are still . She still takes medication for her PTSD and post partum depression.     She is anxious about the thought of pregnancy but they still contemplate having another just not soon. She worries about hyperemesis again as well as her birth experience.    Menses- regular.  Contraception- condoms.    ROS: No Cardiac, Respiratory, GI,  or Neurological symptoms.    PE:  GENERAL: well developed, well nourished, in no apparent distress  SKIN: no rashes, no suspicious lesions  HEENT: normal  NECK: supple; no thyroidmegaly, no adenopathy  LUNGS: clear to auscultation  CARDIOVASCULAR: normal S1, S2, RRR  BREASTS: firm, nontendder, no palpable masses or nodes, no nipple discharge, no skin changes, no axillary adenopathy,    ABDOMEN: Soft, non distended; non tender, no masses  GYNE/: External Genitalia: Normal without lesions or erythema                      Vagina: normal without lesions, small old blood                      Uterus: mid, mobile, non tender, normal size                     Cervix: no lesions or CMT                     Adnexa: non tender, no masses, normal size  EXTREMITIES:  non tender without edema    A/P:   1. Well woman exam with routine gynecological exam  Regular self breast exams recommended    2. Cervical cancer screening  - ThinPrep PAP with HPV Reflex Request; Future  - ThinPrep PAP with HPV Reflex Request    Support offered regarding care and future pregnancy     Return to clinic 1 year for routine exam, or as needed with any concerns or question

## 2024-12-11 NOTE — TELEPHONE ENCOUNTER
Palak Fletcher, JESUSITA  P [facility] Clinical Staff  Results reviewed.  Vitamin D mildly low. Recommend 2000 units over the counter daily

## 2024-12-11 NOTE — TELEPHONE ENCOUNTER
Hi Dr. Fletcher,  I did blood work for Dr. Bardales today to recheck iron levels because I am experiencing extreme fatigue again. He suggested reaching out to you to see if there is any additional bloodwork you recommend to see what else, if anything, could be contributing to this. I am staying hydrated, getting enough sleep, and exercising. Do you have any recommendations? I have not been taking a multivitamin since Dr. Jensen had advised to stop in July.

## 2024-12-11 NOTE — TELEPHONE ENCOUNTER
Juan Miguel Russell!   The results came back and it is on the lower end. Could this also be why my transferrin levels are low? Is there anything I should take to help this? Do you still recommend doing the thyroid blood work?

## 2024-12-11 NOTE — TELEPHONE ENCOUNTER
I can't answer that, I would reach out to hematologist  Yes, would recommend getting thyroid checked

## 2024-12-12 NOTE — TELEPHONE ENCOUNTER
Patient received HPV results in her mychart and wanted to discuss next steps. Please advise, thank you

## 2024-12-12 NOTE — TELEPHONE ENCOUNTER
Called and spoke with pt. And told her we are still waiting on sub-typing to come back and it can still take a few of days ..told pt. Once this is back it will go into Millie's results box and she will results it and come up with a POC.  Pt. verbalizes understanding and agrees to plan.

## 2024-12-12 NOTE — TELEPHONE ENCOUNTER
----- Message from Palak Fletcher sent at 12/12/2024  7:42 AM CST -----  Results reviewed.   TSH is low, I would recheck in 3 weeks with other lab work  No anemia

## 2024-12-13 NOTE — TELEPHONE ENCOUNTER
----- Message from Margaret Garcia sent at 12/12/2024  4:51 PM CST -----  Patient will need a colposcopy

## 2024-12-13 NOTE — TELEPHONE ENCOUNTER
Patient scheduled for colposcopy first MD available  Future Appointments   Date Time Provider Department Center   12/23/2024  7:30 AM REF [facility] [place]   1/2/2025  7:30 AM REF [facility] [place]   1/22/2025  8:00 AM Erica Barnes MD [facility] [place]

## 2024-12-13 NOTE — TELEPHONE ENCOUNTER
I spoke with pt about results & Colposcopy orders. PSR will reach out to her to schedule her appointment.  Pt at work so I offered to send Colposcopy info to her in [employer]. Pt agrees to plan. She has had Colposcopy 2 years ago. Instructions & education sent to pt in [employer].

## 2024-12-13 NOTE — TELEPHONE ENCOUNTER
PSR: Please contact pt to schedule Colposcopy with an MD. 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 Thank you.

## 2024-12-23 NOTE — TELEPHONE ENCOUNTER
12/23/2024  2:02 PM LANDON Valdivia RN Patient Medical Advice Request  test results     Patient viewed [employer] message

## 2024-12-23 NOTE — TELEPHONE ENCOUNTER
----- Message from Palak Fletcher sent at 12/23/2024  2:01 PM CST -----  Results reviewed.   TSH remains low. I would recommend scheduling with endocrinology  Refer to Dr. Torres

## 2025-01-04 NOTE — PROGRESS NOTES
Judi Garnica is a 34 year old female.    S:  Patient presents today with the following concerns:  Chief Complaint   Patient presents with    Cold     7 days, congestion, sore throat, fatigue, cough, body aches  OTC cold and flu daytime, cold and flu nighttime   Ears have been hurting on and off.    Patient notes that symptoms were improving and then they  worsened.  Much more pressure in face and nasal congestion.  No fevers, N/V/D.    9 month old daughter is ill as well.  She is not breastfeeding.    Current Outpatient Medications   Medication Sig Dispense Refill    cefuroxime 250 MG Oral Tab Take 1 tablet (250 mg total) by mouth 2 (two) times daily for 7 days. 14 tablet 0    tobramycin 0.3 % Ophthalmic Solution 1-2 gtts in affected eye(s) q 4 hr for 5 days 5 mL 0    valACYclovir 500 MG Oral Tab Take 1 tablet (500 mg total) by mouth 2 (two) times daily.      sertraline 50 MG Oral Tab        Patient Active Problem List   Diagnosis    Other allergy, other than to medicinal agents    History of cold sores    Anxiety    Hypertriglyceridemia    Cervical dysplasia    Hyperemesis    Vomiting of pregnancy (HCC)    Sinus tachycardia    S/P  section    Acute blood loss anemia    Iron deficiency     Family History   Problem Relation Age of Onset    Arthritis Paternal Grandmother     Cancer Paternal Grandmother         lung, breast, and brain cancer    Breast Cancer Paternal Grandmother     Mental Disorder Paternal Grandmother     Diabetes Maternal Grandmother     Heart Disease Paternal Grandfather     Stroke Paternal Grandfather     Other (mental disability) Mother         suicide    Mental Disorder Mother     Other (mental disability) Maternal Grandfather     Heart Disorder Father     Diabetes Father     Heart Attack Father     Ovarian Cancer Maternal Aunt     Mental Disorder Sister        REVIEW OF SYSTEMS:  GENERAL: feels unwell  SKIN: denies any unusual skin lesions  EYES:denies vision  change  LUNGS: denies shortness of breath with exertion  CARDIOVASCULAR: denies chest pain on exertion  GI: denies abdominal pain.  No N/V/D/C  : denies dysuria  MUSCULOSKELETAL: denies back pain  NEURO: sinus headaches    EXAM:  /66   Pulse 88   Temp 98.7 °F (37.1 °C)   Resp 16   Ht 5' 2\" (1.575 m)   Wt 126 lb (57.2 kg)   LMP 01/03/2025 (Exact Date)   SpO2 96%   Breastfeeding No   BMI 23.05 kg/m²   Physical Exam  Constitutional:       General: She is not in acute distress.     Appearance: Normal appearance. She is not ill-appearing, toxic-appearing or diaphoretic.   HENT:      Head: Normocephalic and atraumatic.      Right Ear: Tympanic membrane, ear canal and external ear normal.      Left Ear: Ear canal and external ear normal.      Ears:      Comments: Left TM with mild bulging throughout and erythema at superior aspect.     Nose: Congestion present.      Mouth/Throat:      Mouth: Mucous membranes are moist.      Pharynx: Oropharynx is clear.   Eyes:      Extraocular Movements: Extraocular movements intact.      Conjunctiva/sclera: Conjunctivae normal.      Pupils: Pupils are equal, round, and reactive to light.   Cardiovascular:      Rate and Rhythm: Normal rate and regular rhythm.      Heart sounds: Normal heart sounds.   Pulmonary:      Effort: Pulmonary effort is normal.      Breath sounds: Normal breath sounds.   Musculoskeletal:      Cervical back: Neck supple. No rigidity or tenderness.   Lymphadenopathy:      Cervical: No cervical adenopathy.   Skin:     General: Skin is warm and dry.   Neurological:      General: No focal deficit present.      Mental Status: She is alert and oriented to person, place, and time.   Psychiatric:         Mood and Affect: Mood normal.         Behavior: Behavior normal.        ASSESSMENT AND PLAN:  Judi Garnica is a 34 year old female.  Encounter Diagnosis   Name Primary?    Left acute suppurative otitis media Yes       No results found.     No  orders of the defined types were placed in this encounter.    Meds & Refills for this Visit:  Requested Prescriptions     Signed Prescriptions Disp Refills    cefuroxime 250 MG Oral Tab 14 tablet 0     Sig: Take 1 tablet (250 mg total) by mouth 2 (two) times daily for 7 days.     Imaging & Consults:  None  Ceftin as above.    Tylenol or ibuprofen prn pain.  Steroid nasal spray.  Fluids, steam, rest.      Follow up if symptoms change, worsen, do not improve.    Patient verbalizes understanding of plan.  No follow-ups on file.

## 2025-01-04 NOTE — PATIENT INSTRUCTIONS
Middle Ear Infection (Otitis Media) in Adults  What is a middle ear infection?  A middle ear infection occurs behind the eardrum. It's most often caused by a virus or bacteria. Most kids have at least one middle ear infection by the time they are 3 years old, but adults can also get them.   What causes middle ear infections?  Inflammation in the middle ear most often starts after you’ve had a sore throat, cold, or other upper respiratory problem. The infection spreads to the middle ear and causes fluid buildup behind the eardrum.    What are the symptoms of a middle ear infection?  These are the most common symptoms of middle ear infections in adults:   Ear pain  Feeling of fullness or pressure in the ear  Fluid draining from the ear (if the eardrum has ruptured or burst)  Fever  Hearing loss  These symptoms may look like other conditions or health problems. Always talk with your healthcare provider for a diagnosis.   How is a middle ear infection diagnosed?  Your healthcare provider will review your health history and do a physical exam. They will check the outer ear and the eardrum using an otoscope. This is a lighted tool that lets the healthcare provider see inside the ear. A pneumatic otoscope blows a puff of air into the ear to test eardrum movement. When there is fluid or infection in the middle ear, movement is decreased.   Your provider may also do a tympanometry. This is a test that directs air and sound to the middle ear.   If you have ear infections often, your healthcare provider may suggest having a hearing test.   How is a middle ear infection treated?  Treatment will depend on your symptoms, age, and general health. It will also depend on how severe the condition is.   Treatment may include:  Taking medicines such as antibiotics and pain relievers  Having a procedure to place small tubes in the eardrum for chronic ear infections   What are possible complications of a middle ear infection?    Untreated ear infections can lead to:  Infection in other parts of the head  Lasting (permanent) hearing loss  Speech and language problems  Can middle ear infections be prevented?  Cold and allergy medicines don't seem to prevent ear infections. And currently there is no vaccine that can prevent the disease. But check with your healthcare provider and make sure your vaccines are up-to-date. Living in a home where cigarettes are smoked can increase the chances of ear infections. So can living in a home where vaping devices, such as e-cigarettes and electronic nicotine, are used. Wash your hands frequently to prevent the spread of germs that cause illness.   Key points about middle ear infections  Middle ear infections can affect both children and adults.  Pain and fever can be the most common symptoms.  Without treatment, permanent hearing loss may happen.  Take antibiotics as prescribed and finish all of the prescription. This can help prevent antibiotic-resistant infections or incomplete treatment with the infection returning.  Keeping your home smoke-free or free of vaping devices can decrease the chances of ear infections.    Next steps  Tips to help you get the most from a visit to your healthcare provider:  Know the reason for your visit and what you want to happen.  Before your visit, write down questions you want answered.  Bring someone with you to help you ask questions and remember what your provider tells you.  At the visit, write down the name of a new diagnosis, and any new medicines, treatments, or tests. Also write down any new directions your provider gives you.  Know why a new medicine or treatment is prescribed, and how it will help you. Also know what the side effects are.  Ask if your condition can be treated in other ways.  Know why a test or procedure is recommended and what the results could mean.  Know what to expect if you do not take the medicine or have the test or procedure.  If you  have a follow-up appointment, write down the date, time, and purpose for that visit.  Know how you can contact your healthcare provider if you have questions.  [employer] last reviewed this educational content on 5/1/2023 © 2000-2023 [employer]. All rights reserved. This information is not intended as a substitute for professional medical care. Always follow your healthcare professional's instructions.

## 2025-01-16 NOTE — PROGRESS NOTES
HPI:     Patient is here for several concerns.    Fatigue  Iron issues  Anxiety    Current Outpatient Medications   Medication Sig Dispense Refill    valACYclovir 500 MG Oral Tab Take 1 tablet (500 mg total) by mouth 2 (two) times daily.      sertraline 50 MG Oral Tab         Past Medical History:    Acute blood loss anemia    Acute sinusitis, unspecified    Acute tonsillitis    Acute tonsillitis    Anemia    Anxiety    Bloating    Body piercing    Chronic tonsillitis    Constipation    Decorative tattoo    Diarrhea, unspecified    Easy bruising    Fatigue    Flatulence/gas pain/belching    Food intolerance    Frequent UTI    Heartburn    Heavy menses    High cholesterol    History of cold sores    History of mental disorder    Hyperemesis    Hypertriglyceridemia    Indigestion    Irregular bowel habits    Itch of skin    Menses painful    Nausea    Otalgia, unspecified    Other allergy, other than to medicinal agents    Pruritus, unspecified    Scoliosis (and kyphoscoliosis), idiopathic    Seasonal allergies    Sinus tachycardia    Skin blushing/flushing    Stool incontinence    Stress    Vomiting    Wears glasses    Weight gain      Past Surgical History:   Procedure Laterality Date    Colposcopy, cervix w/upper adjacent vagina; w/biopsy(s), cervix Bilateral 01/01/2020    Tonsillectomy        Family History   Problem Relation Age of Onset    Arthritis Paternal Grandmother     Cancer Paternal Grandmother         lung, breast, and brain cancer    Breast Cancer Paternal Grandmother     Mental Disorder Paternal Grandmother     Diabetes Maternal Grandmother     Heart Disease Paternal Grandfather     Stroke Paternal Grandfather     Other (mental disability) Mother         suicide    Mental Disorder Mother     Other (mental disability) Maternal Grandfather     Heart Disorder Father     Diabetes Father     Heart Attack Father     Ovarian Cancer Maternal Aunt     Mental Disorder Sister       Social History      Socioeconomic History    Marital status:    Occupational History    Occupation:    Tobacco Use    Smoking status: Never    Smokeless tobacco: Never   Vaping Use    Vaping status: Never Used   Substance and Sexual Activity    Alcohol use: Not Currently     Comment: Very rarely    Drug use: Never    Sexual activity: Yes     Partners: Male     Birth control/protection: Condom   Other Topics Concern     Service No    Blood Transfusions No    Caffeine Concern Yes     Comment: 1 cup coffee daily    Sleep Concern No    Stress Concern No    Weight Concern No    Special Diet No    Exercise Yes     Comment: 3 x a week     Seat Belt Yes    Self-Exams Yes     Social Drivers of Health     Financial Resource Strain: Low Risk  (3/23/2024)    Financial Resource Strain     Difficulty of Paying Living Expenses: Not hard at all     Med Affordability: No   Food Insecurity: No Food Insecurity (3/23/2024)    Food Insecurity     Food Insecurity: Never true   Transportation Needs: No Transportation Needs (3/23/2024)    Transportation Needs     Lack of Transportation: No   Stress: No Stress Concern Present (3/23/2024)    Stress     Feeling of Stress : No   Housing Stability: Low Risk  (3/23/2024)    Housing Stability     Housing Instability: No         REVIEW OF SYSTEMS:   Review of Systems    EXAM:   /60 (BP Location: Left arm, Patient Position: Sitting, Cuff Size: adult)   Pulse 86   Temp 97.7 °F (36.5 °C) (Temporal)   Ht 5' 2\" (1.575 m)   Wt 126 lb (57.2 kg)   LMP 01/03/2025 (Exact Date)   SpO2 99%   BMI 23.05 kg/m²   Physical Exam    ASSESSMENT AND PLAN:   Diagnoses and all orders for this visit:    Other fatigue    Low TSH level

## 2025-01-17 NOTE — TELEPHONE ENCOUNTER
----- Message from Palak Feltcher sent at 1/17/2025  7:21 AM CST -----  Results reviewed.   TSH remains low. Can start Tapazole. Recheck TSH in 4 weeks  Call or message with update on anxiety symptoms next week. If still struggling, will consider increasing Sertraline to 75 mg   Ferritin levels stable in normal range  No anemia

## 2025-01-20 NOTE — PROGRESS NOTES
No chief complaint on file.   Allergic reaction to methimazole  This visit is conducted using Telemedicine with live, interactive video and audio.    Patient has been referred to the [facility] website at [email or web address] to review the yearly Consent to Treat document.    Patient understands and accepts financial responsibility for any deductible, co-insurance and/or co-pays associated with this service.        HPI:    Patient ID: Judi Garnica is a 34 year old female.    HPI Judi is here to discuss allergic reaction to methimazole. She started it on Friday morning and end of day she notice rash over her face upper chest and abdomen. She also has sore throat and throat itching. No sob wheezing. No tongue or lip swelling. She is taking benadryl with relief of symptoms. She feels extreme tired and fatigue. She has hair fall.      Review of Systems  Pos fatigue rash sore throat and itching      Current Outpatient Medications   Medication Sig Dispense Refill    valACYclovir 500 MG Oral Tab Take 1 tablet (500 mg total) by mouth 2 (two) times daily. 10 tablet 0    methIMAzole 5 MG Oral Tab Take 1 tablet (5 mg total) by mouth daily. 30 tablet 0    sertraline 50 MG Oral Tab        Allergies:Allergies[1]    HISTORY:  Past Medical History:    Acute blood loss anemia    Acute sinusitis, unspecified    Acute tonsillitis    Acute tonsillitis    Anemia    Anxiety    Bloating    Body piercing    Chronic tonsillitis    Constipation    Decorative tattoo    Diarrhea, unspecified    Easy bruising    Fatigue    Flatulence/gas pain/belching    Food intolerance    Frequent UTI    Heartburn    Heavy menses    High cholesterol    History of cold sores    History of mental disorder    Hyperemesis    Hypertriglyceridemia    Indigestion    Irregular bowel habits    Itch of skin    Menses painful    Nausea    Otalgia, unspecified    Other allergy, other than to medicinal agents    Pruritus, unspecified    Scoliosis (and  kyphoscoliosis), idiopathic    Seasonal allergies    Sinus tachycardia    Skin blushing/flushing    Stool incontinence    Stress    Vomiting    Wears glasses    Weight gain      Past Surgical History:   Procedure Laterality Date    Colposcopy, cervix w/upper adjacent vagina; w/biopsy(s), cervix Bilateral 01/01/2020    Tonsillectomy        Family History   Problem Relation Age of Onset    Arthritis Paternal Grandmother     Cancer Paternal Grandmother         lung, breast, and brain cancer    Breast Cancer Paternal Grandmother     Mental Disorder Paternal Grandmother     Diabetes Maternal Grandmother     Heart Disease Paternal Grandfather     Stroke Paternal Grandfather     Other (mental disability) Mother         suicide    Mental Disorder Mother     Other (mental disability) Maternal Grandfather     Heart Disorder Father     Diabetes Father     Heart Attack Father     Ovarian Cancer Maternal Aunt     Mental Disorder Sister       Social History:   Social History     Socioeconomic History    Marital status:    Occupational History    Occupation:    Tobacco Use    Smoking status: Never    Smokeless tobacco: Never   Vaping Use    Vaping status: Never Used   Substance and Sexual Activity    Alcohol use: Not Currently     Comment: Very rarely    Drug use: Never    Sexual activity: Yes     Partners: Male     Birth control/protection: Condom   Other Topics Concern     Service No    Blood Transfusions No    Caffeine Concern Yes     Comment: 1 cup coffee daily    Sleep Concern No    Stress Concern No    Weight Concern No    Special Diet No    Exercise Yes     Comment: 3 x a week     Seat Belt Yes    Self-Exams Yes     Social Drivers of Health     Financial Resource Strain: Low Risk  (3/23/2024)    Financial Resource Strain     Difficulty of Paying Living Expenses: Not hard at all     Med Affordability: No   Food Insecurity: No Food Insecurity (3/23/2024)    Food Insecurity     Food Insecurity:  Never true   Transportation Needs: No Transportation Needs (3/23/2024)    Transportation Needs     Lack of Transportation: No   Stress: No Stress Concern Present (3/23/2024)    Stress     Feeling of Stress : No   Housing Stability: Low Risk  (3/23/2024)    Housing Stability     Housing Instability: No        PHYSICAL EXAM:    LMP 01/03/2025 (Exact Date)    Physical Exam  Constitutional:       General: She is not in acute distress.     Appearance: She is not ill-appearing.   Skin:     Comments: Rash noted upper chest and chin.  No tongue lip swelling.    Neurological:      General: No focal deficit present.      Mental Status: She is alert.              ASSESSMENT/PLAN:   1. Allergic contact dermatitis due to systemic medicament  Allergic reaction due to methimazole. Stop methimazole.  Can take benadryl for few days  If sym no get better in few days call office and consider prednisone.   Recommedn to see endocrine.             No follow-ups on file.        [1]   Allergies  Allergen Reactions    Amoxicillin HIVES    Methimazole [Thiamazole] HIVES    Other OTHER (SEE COMMENTS)    Seasonal OTHER (SEE COMMENTS)

## 2025-01-20 NOTE — TELEPHONE ENCOUNTER
Call from patient  States started methimazole 5mg on Friday  Got full body rash Friday night that is itchy  Throat feels itchy  Took benadryl and that helps  No difficulty breathing or wheezing  No difficulty swallowing  No lip or tongue swelling  Advised to push fluids and not take another dose of the medication  Routing to covering provider   Allergy list updated  Please advise

## 2025-01-20 NOTE — TELEPHONE ENCOUNTER
Future Appointments   Date Time Provider Department Center   1/20/2025 10:40 AM Najma Wood MD [facility]   1/22/2025  8:00 AM Erica Barnes MD [facility]   3/20/2025  9:30 AM Annetta Torres DO [id number]     Patient scheduled virtual visit today with dr wood

## 2025-01-22 NOTE — PROCEDURES
Colposcopy Procedure Note    Date of Procedure: 01/22/25    Indications: 34 year old y/o female with high risk HPV    Pre-procedure diagnosis:   HPV+    Post-procedure diagnosis:  HPV+    12/2024 - NILM HPV +   9/2023 - NILM HPV neg     Procedure:  Colposcopy   Cervical Biopsy   ECC     Procedure Details:   A discussion was held with patient including diagnosis, prognosis and management. Recommendation made for colposcopy with possible biopsies. Risks, benefits and alteratives were discussed with the patient. The patient was agreed to proceed with procedure. She provided written and verbal consent. The patient was positioned supine and in stir-ups.    The sterile speculum was placed in the vagina and the cervix was visualized. The vagina appeared normal with no lesions or discolorations noted. The cervix was erythematous. Green light filter was negative.      After application of acetic acid, white feathery acetowhite changes were noted at 7:00 and 1:00.  No mocaism, no punctations seen on gross examination. This findings were consistent after the application of Lugol's solution.     A biopsy was then collected at each indicated lesion site.     The transformation zone was incompletely visualized. No lesions noted. Satisfactory Colposcopy. Therefore, ECC was collected.     Biopsy sites were examined. Monsel's solution was applied to the cervix. Good hemostasis noted. All instruments were removed from the vagina.     All tissue were placed into the designated specimen containers and collected to be sent to pathology.     Impression: ENZO I pending final pathology     Disposition: Stable. RTC in 1 year for well woman exam or sooner if needed.        Erica Barnes MD   [facility]      Note to patient and family   The 21st Century Cures Act makes medical notes available to patients in the interest of transparency.  However, please be advised that this is a medical document.  It is intended as [id number] communication.   It is written and medical language may contain abbreviations or verbiage that are technical and unfamiliar.  It may appear blunt or direct.  Medical documents are intended to carry relevant information, facts as evident, and the clinical opinion of the practitioner.

## 2025-02-07 NOTE — PROGRESS NOTES
CHIEF COMPLAINT:     Chief Complaint   Patient presents with    Sinus Problem     1/26 COVID positive    Continued sinus congestion and ear pain - Entered by patient          HPI:   Judi Garnica is a 34 year old female who presents with ear pain, sinus pain, fatigue, dry cough.  She reports she was covid positive on 1/26/25 on a home covid test and at that time she had 2 days of symptoms.  Overall she has improved with less fatigue and now has primarily sinus and ear symptoms.      Current Associated symptoms:    Fever/Chills  No  Sore throat  yes  Cough  Yes   Congestion Yes  Bodyache  Yes  Headache  Yes  Chest pain No  SOB/Dyspnea No  Loss of taste No  Loss of smell No  Diarrhea No  Vomiting No    Covid vaccination:  primary series plus 1 booster.     No chronic sinus issues.       Current Outpatient Medications   Medication Sig Dispense Refill    Doxycycline Hyclate 100 MG Oral Tab Take 1 tablet (100 mg total) by mouth 2 (two) times daily. 20 tablet 0    sertraline 50 MG Oral Tab       valACYclovir 500 MG Oral Tab Take 1 tablet (500 mg total) by mouth 2 (two) times daily. (Patient not taking: Reported on 2/7/2025) 10 tablet 0      Past Medical History:    Acute blood loss anemia    Acute sinusitis, unspecified    Acute tonsillitis    Acute tonsillitis    Anemia    Anxiety    Bloating    Body piercing    Chronic tonsillitis    Constipation    Decorative tattoo    Diarrhea, unspecified    Easy bruising    Fatigue    Flatulence/gas pain/belching    Food intolerance    Frequent UTI    Heartburn    Heavy menses    High cholesterol    History of cold sores    History of mental disorder    Hyperemesis    Hypertriglyceridemia    Indigestion    Irregular bowel habits    Itch of skin    Menses painful    Nausea    Otalgia, unspecified    Other allergy, other than to medicinal agents    Pruritus, unspecified    Scoliosis (and kyphoscoliosis), idiopathic    Seasonal allergies    Sinus tachycardia    Skin  blushing/flushing    Stool incontinence    Stress    Vomiting    Wears glasses    Weight gain      Social History:  Social History     Socioeconomic History    Marital status:    Occupational History    Occupation:    Tobacco Use    Smoking status: Never    Smokeless tobacco: Never   Vaping Use    Vaping status: Never Used   Substance and Sexual Activity    Alcohol use: Not Currently     Comment: Very rarely    Drug use: Never    Sexual activity: Yes     Partners: Male     Birth control/protection: Condom   Other Topics Concern     Service No    Blood Transfusions No    Caffeine Concern Yes     Comment: 1 cup coffee daily    Sleep Concern No    Stress Concern No    Weight Concern No    Special Diet No    Exercise Yes     Comment: 3 x a week     Seat Belt Yes    Self-Exams Yes     Social Drivers of Health     Food Insecurity: No Food Insecurity (3/23/2024)    Food Insecurity     Food Insecurity: Never true   Transportation Needs: No Transportation Needs (3/23/2024)    Transportation Needs     Lack of Transportation: No   Stress: No Stress Concern Present (3/23/2024)    Stress     Feeling of Stress : No   Housing Stability: Low Risk  (3/23/2024)    Housing Stability     Housing Instability: No        Review of Systems:    Positive for stated complaint: sinus/ear fullness.   Pertinent positives and negatives noted in the the HPI.    EXAM:   /78   Pulse 71   Temp 97 °F (36.1 °C)   Resp 18   Ht 5' 2\" (1.575 m)   Wt 125 lb (56.7 kg)   LMP 02/03/2025 (Exact Date)   SpO2 99%   BMI 22.86 kg/m²   GENERAL: well developed, well nourished,in no apparent distress  SKIN: no rashes,no suspicious lesions  HEAD: atraumatic, normocephalic  EYES: conjunctiva clear, sclera white,  PERRLA  EARS: TM's non erythematous  NOSE: nares patent, mucosa mild congestion.  Mild tenderness over maxillary sinuses  THROAT: Posterior pharynx is non erythematous  NECK: supple, non-tender  LUNGS: clear to  auscultation bilaterally without rale, ronchi, wheeze.  CARDIO: S1/S2 without murmur  EXTREMITIES: no cyanosis, clubbing or edema  LYMPH:  no gross lymphadenopathy.      No results found for this or any previous visit (from the past 24 hours).      ASSESSMENT AND PLAN:   Judi Garnica is a 34 year old female who presents with Sinus Problem (1/26 COVID positive//Continued sinus congestion and ear pain - Entered by patient/). Symptoms are consistent with:      ASSESSMENT:  Encounter Diagnosis   Name Primary?    Acute non-recurrent maxillary sinusitis Yes       PLAN:     Symptomatic care:   1. Rest. Drink plenty of fluids.  2. Tylenol or ibuprofen for discomfort or fever.   3. OTC decongestant (phenylephrine) expectorants (guaifenesin), nasal steroid sprays  (fluticasone) may be helpful        for congestion.  4. OTC cough suppressant for cough  (dextromethorphan)  5. Chloraseptic spray/throat lozenges for sore throat   6 doxy as written     Go to the ED for evaluation with progressive symptoms of difficulty swallowing, breathing, shortness of breath, chest pain, extreme weakness, or confusion.         Meds & Refills for this Visit:  Requested Prescriptions     Signed Prescriptions Disp Refills    Doxycycline Hyclate 100 MG Oral Tab 20 tablet 0     Sig: Take 1 tablet (100 mg total) by mouth 2 (two) times daily.       Risks, benefits, side effects of medication addressed and explained.    There are no Patient Instructions on file for this visit.    The patient indicates understanding of these issues and agrees to the plan.  The patient is asked to follow up PCP

## 2025-02-20 NOTE — PATIENT INSTRUCTIONS
Let's start with the nuclear scan to confirm this is Graves' disease.   Let's start low dose metoprolol 12.5mg (half tablet) morning and evening to see if that helps with any of your symptoms. If it does help, you can try 25mg tablets as well.   Depending on the result of the scan (whether it shows Graves' or thyroiditis) we can touch base about treatment plan.     General follow up information:  Please let us know if you require any refills at least 1 week prior to your medication running out. If you do run out of medication, please call our office ASAP to request refills (do not wait until your follow up).  Please call us if you experience any problems with insurance coverage of medication, lab work, or imaging.   Lab results and imaging will typically be reviewed at follow up appointments, or within 3-5 business days of ALL results being in if you do not have an appointment scheduled in the near future. Our office will contact you for any abnormal results requiring more urgent follow up or action.   The on-call pager is for urgent matters only. If you are a type 1 diabetic and run out of insulin after business hours 8AM-4PM, you may call the on-call pager for a refill to a 24 hour pharmacy. If you have adrenal insufficiency and run out of steroids, you may call the on-call pager for a refill to a 24 hour pharmacy. All other refill requests should be requested during business hours.    Return Visit   [] Dr. Torres in 4-6 weeks or earliest available  [] Central scheduling [facility]

## 2025-02-20 NOTE — H&P
Endocrinology Clinic Note    Name: Judi Garnica    Date: 2025       HISTORY OF PRESENT ILLNESS   Judi Garnica is a 34 year old female with PMHx significant for anxiety, hyperlipidemia, recent complicated pregnancy/delivery in  (complicated by gestational hypertension and complicated labor requiring unintended  with postop blood transfusion), iron deficiency anemia who presents for initial endocrine consultation for abnormal thyroid function.  The patient reports she developed severe fatigue several months after delivering her child in 2024, initial workup showed notable iron deficiency for which she was treated with iron infusions.  Fatigue persisted despite normalization of iron levels and further workup revealed low TSH first noted in 2024.  The patient also reports a constellation of symptoms including worsening anxiety, brain fog, insomnia, severe fatigue.  She has not been breast-feeding during this pregnancy but was initially able to produce breastmilk.  Reports she was treated with propranolol shortly after her delivery by her psychiatrist for anxiety.  No recent iodinated contrast. She does have occasional right-sided neck pain but denies dysphagia, dysphonia.  Low TSH has persisted on repeat labs in December and January.  She was seen by an endocrinologist in the [facility] system earlier this week who ordered additional labs which were done 2025 and showed persistently low TSH, low normal free T4 at 0.9, normal total T3 at 1.12, and mildly positive TSI and TRAb antibodies.  She reports that her primary care doctor did put her on methimazole after her first low TSH result, but she developed an itchy rash after 2 doses and subsequently discontinued the medication.  She is not currently on any kind of beta-blockade.  Further data as below.      PAST MEDICAL HISTORY:   Past Medical History:    Acute blood loss anemia    Acute sinusitis,  unspecified    Acute tonsillitis    Acute tonsillitis    Anemia    Anxiety    Bloating    Body piercing    Chronic tonsillitis    Constipation    Decorative tattoo    Diarrhea, unspecified    Easy bruising    Fatigue    Flatulence/gas pain/belching    Food intolerance    Frequent UTI    Heartburn    Heavy menses    High cholesterol    History of cold sores    History of mental disorder    Hyperemesis    Hypertriglyceridemia    Indigestion    Irregular bowel habits    Itch of skin    Menses painful    Nausea    Otalgia, unspecified    Other allergy, other than to medicinal agents    Pruritus, unspecified    Scoliosis (and kyphoscoliosis), idiopathic    Seasonal allergies    Sinus tachycardia    Skin blushing/flushing    Stool incontinence    Stress    Vomiting    Wears glasses    Weight gain       PAST SURGICAL HISTORY:   Past Surgical History:   Procedure Laterality Date    Colposcopy, cervix w/upper adjacent vagina; w/biopsy(s), cervix Bilateral 01/01/2020    Tonsillectomy         CURRENT MEDICATIONS:    Current Outpatient Medications   Medication Sig Dispense Refill    valACYclovir 500 MG Oral Tab Take 1 tablet (500 mg total) by mouth 2 (two) times daily. 10 tablet 0    sertraline 50 MG Oral Tab            ALLERGIES:  Allergies[1]    SOCIAL HISTORY:    Social History     Socioeconomic History    Marital status:    Occupational History    Occupation:    Tobacco Use    Smoking status: Never    Smokeless tobacco: Never   Vaping Use    Vaping status: Never Used   Substance and Sexual Activity    Alcohol use: Not Currently     Comment: Very rarely    Drug use: Never    Sexual activity: Yes     Partners: Male     Birth control/protection: Condom   Other Topics Concern     Service No    Blood Transfusions No    Caffeine Concern Yes     Comment: 1 cup coffee daily    Sleep Concern No    Stress Concern No    Weight Concern No    Special Diet No    Exercise Yes     Comment: 3 x a week      Seat Belt Yes    Self-Exams Yes       FAMILY HISTORY:   Family History   Problem Relation Age of Onset    Arthritis Paternal Grandmother     Cancer Paternal Grandmother         lung, breast, and brain cancer    Breast Cancer Paternal Grandmother     Mental Disorder Paternal Grandmother     Diabetes Maternal Grandmother     Heart Disease Paternal Grandfather     Stroke Paternal Grandfather     Other (mental disability) Mother         suicide    Mental Disorder Mother     Other (mental disability) Maternal Grandfather     Heart Disorder Father     Diabetes Father     Heart Attack Father     Ovarian Cancer Maternal Aunt     Mental Disorder Sister          REVIEW OF SYSTEMS:  Ten point review of systems has been performed and is otherwise negative and/or non-contributory, except as described above.      PHYSICAL EXAM:   Vitals:    02/20/25 1057   BP: 112/64   Pulse: 70   Resp: 18   SpO2: 98%   Weight: 124 lb (56.2 kg)   Height: 5' 2\" (1.575 m)     BMI: Body mass index is 22.68 kg/m².     CONSTITUTIONAL:  awake, alert, cooperative, no apparent distress, and appears stated age  PSYCH: normal affect  EYES:  No proptosis,  conjunctiva normal  ENT:  Normocephalic, atraumatic  NECK:  Supple, symmetrical, thyroid not enlarged and no tenderness  LUNGS: breathing comfortably  CARDIOVASCULAR:  regular rate   ABDOMEN:  soft  SKIN:  no rashes and no lesions  EXTREMITIES: no edema      DATA:     Pertinent data reviewed 2/20/2025.      ASSESSMENT AND PLAN:    #Subclinical hyperthyroidism  #History of drug reaction to thionamides  -Extensively reviewed course to date in detail with the patient including thyroid physiology and pathophysiology  -Reviewed differential diagnosis of low TSH including Graves' disease (most likely the diagnosis given that TSI/TRAb are both positive), postpartum thyroiditis, central hypothyroidism in the context of peripartum bleeding (would not expect this so long after delivery)  -Patient did have a drug  reaction to trial of methimazole, reviewed that PTU could be considered but that it it is also possible that she could have reaction to PTU as well due to cross-reactivity  -Recent free T4 was unexpectedly borderline low, though there is natural variation in thyroid levels and Graves' remains the most likely diagnosis, given the patient's previous reaction to a thionamide discussed option to confirm Graves' diagnosis with thyroid uptake/scan prior to initiation of PTU or consideration of alternate definitive therapies.  The patient prefers to confirm the diagnosis with imaging, advised her to discuss any isolation precautions with nuclear medicine though it would be unlikely that she would need isolation etc. given very small doses of radioiodine used for diagnostic purposes.  She is not currently breast-feeding.  -She does hope to have another child in the next 2 years  -Reviewed alternate definitive therapies including radioactive iodine and thyroidectomy, though would defer any decisions about definitive therapy until diagnosis is fully confirmed  -Reviewed that if there is evidence of postpartum thyroiditis, this is managed conservatively and she would need to be observed for development of post thyroiditis hypothyroidism  -For symptomatic management, we will offer trial of beta-blockade to see if this helps with the patient's anxiety, insomnia, etc.  Start metoprolol 12.5 mg twice daily and uptitrate as tolerated.  -Next steps pending uptake/scan results.  If Graves' disease and confirmed, will discuss empiric trial of PTU versus definitive therapy pending patient's preference.  -All questions answered in detail    The above plan was discussed in detail with the patient who verbalized understanding and agreement.      A total of 60 minutes was spent today on obtaining history, reviewing outside records, reviewing pertinent labs/imaging, reviewing relevant pathophysiology with patient, evaluating patient,  providing multiple treatment options, communicating with patient's other providers as appropriate, and completing documentation and orders.      Annetta Torres DO  [facility]  2/20/2025     Note to patient: The 21 Century Cures Act makes medical notes like these available to patients in the interest of transparency. However, be advised this is a medical document. It is intended as peer to peer communication. It is written in medical language and may contain abbreviations or verbiage that are unfamiliar. It may appear blunt or direct. Medical documents are intended to carry relevant information, facts as evident, and the clinical opinion of the practitioner.      In reviewing this note, please be advised that Dragon Voice Recognition software used to dictate the note may have made errors in recognizing some of the words or phrases.           [1]   Allergies  Allergen Reactions    Amoxicillin HIVES    Methimazole [Thiamazole] HIVES    Other OTHER (SEE COMMENTS)    Seasonal OTHER (SEE COMMENTS)

## 2025-02-21 NOTE — TELEPHONE ENCOUNTER
This is a good idea, we can start compiling one.     There are no specific ophthos I recommend for NITESH, I think anyone should be able to do a basic eval. She has a PPO so she shouldn't need a referral, she can just check who is within her insurance network and make an appointment with anyone convenient for her!

## 2025-02-21 NOTE — TELEPHONE ENCOUNTER
She is free to consult with an eye doctor at any time regarding risk of NITESH given that she has positive Graves' antibodies in the blood, though if her nuclear scan does not show \"active\" Graves, NITESH is very unlikely. Though it can present with dryness/redness, clinical NITESH usually appears with inflammation of the globe and ultimately protrusion of the eyes. An ophthalmologist would be able to let her know of any testing/concern for NITESH from their end and may provide peace of mind.

## 2025-02-24 NOTE — TELEPHONE ENCOUNTER
To my knowledge, liver enzyme elevation would be more likely to cause jaundice rather than to cause a rash as she is seeing (particularly if she's not having RUQ pain or any other symptoms of liver damage). It would also be unusual for her to develop liver damage from hyperthyroidism without being on a thionamide (which is the more typical reason for liver problems in Graves' patients, rather than the Graves' itself). If she is wanting a liver evaluation I would suggest touching base with her PCP to see if they suggest any appropriate lab work.

## 2025-02-24 NOTE — TELEPHONE ENCOUNTER
Sorry to hear that - would have her stop the medication. With now multiple recent apparent drug rashes I agree she should see allergy/immunology, since she has a PPO I would call around and see where she can get in the earliest. I'm not sure what the cause is but since it's happened twice after two different medications, it might be a reaction to an inactive ingredient or something else they might be able to test for. I'll touch base with her uptake/scan results next week.

## 2025-02-24 NOTE — TELEPHONE ENCOUNTER
Phoned patient and reviewed Dr. Torres's response, she verbalized understanding of all. States she will call around for allergy/immunology- reviewed if she needs a referral from our office, can let us know, but she should be able to schedule with her PPO insurance.    Pt states she was on a Graves' support group page and came across someone with similar rash reaction- wondering if liver enzymes could possibly related to this rash reaction? Particularly if they're elevated?

## 2025-03-05 NOTE — TELEPHONE ENCOUNTER
[facility] requesting last office note  Fax last office notes to [phone]   Patient has an scheduled appointment with Ophthos 3/6/2025  Closing encounter

## 2025-03-06 NOTE — TELEPHONE ENCOUNTER
Reviewed imaging, consistent with Graves' disease but suggestive of possible underlying nodules. Though these nodules are not \"worrisome\" we do like to get a sense of shape and size of nodules if present which is best done with non-urgent ultrasound, which I will order. In terms of next steps - she has had apparent drug rashes to both methimazole and metoprolol. She can do a cautious trial of PTU, but many patients do cross react allergies who have had allergies with methimazole. If electing to start that, would start with PTU once a day to see if it causes a rash. If it does, would discontinue immediately and then options for treatment would be radioactive iodine therapy or thyroidectomy. I'm not sure if she had any luck scheduling with allergy/immunology; she does not have to stay within our system for that.

## 2025-03-06 NOTE — TELEPHONE ENCOUNTER
3/6/25-Patient called into clinic today.  Verified name and .  Patient was calling regard the results of NM and what next steps would be.  RN was unavailable and I let her know I would send a message and that someone would get back to her today or tomorrow.

## 2025-03-07 NOTE — TELEPHONE ENCOUNTER
Patient phoned office to f/u on [employer] messages. Reviewed Dr. Torres has not yet reviewed messages.    Patient would also like to add that her resting heart rate this morning went up to 134. She had stopped Metoprolol d/t rash.     Pt asking if she should be concerned by this heart rate, anything further that she should be doing for heart rate management?

## 2025-03-07 NOTE — TELEPHONE ENCOUNTER
I can give her a course of prednisone in case of rash with PTU, but would not like her to take it UNLESS she sees any rash develop (if she takes it regardless, we won't know if PTU causes a rash or not and she's can't take steroids on an ongoing basis for the purposes of a rash as it is unsafe to take high dose steroids longterm). If she does develop rash with PTU, then our only other option for treatment is thyroidectomy with ENT - if there is concern for NITESH, radioactive iodine is no longer a safe option for treatment as that can cause damage to the eyes in patients with NITESH. I would say let's have her try PTU 50mg once daily and I will give her a course of prednisone to start IF rash occurs. If rash does occur, we can treat with steroids, but then I would suggest considering ENT evaluation.     Can she have her eye doctor send us their office notes for our records?    With regard to HR - would recommend she stay as hydrated as possible as dehydration can make the heart rate worse. If she has any chest pain, dizziness, shortness of breath, syncope would consider ER visit to make sure the HR is not TOO fast (or if seeing rates >150). Unfortunately since she had an allergy to the beta blocker I gave her before I don't have a medication to lower the HR for now - getting the thyroid under control will hopefully address the HR.

## 2025-03-07 NOTE — TELEPHONE ENCOUNTER
Phoned patient and updated on below.    She verbalized understanding of all.    Reviewed to update office as needed, states will do.    States she spoke with [facility] and they will be sending report of her most recent visit.    Closing this encounter.

## 2025-03-07 NOTE — TELEPHONE ENCOUNTER
1) Let's have her try PTU 50mg x1 for the weekend to check for reaction. If any rash, start the prednisone that was sent to pharmacy. Do NOT take pred if no reaction. Side effects that can be seen with HIGH DOSE PTU are stress on the liver and reduce white blood cells, would not expect this with low doses.     3) Regardless of whether or not reaction occurs, she can star propranolol on Monday. Let's start with 10mg TID.    2) Will look out for her ultrasound result. Nodules by themselves are not usually harmful or dangerous so I don't want her to worry about them even if they are present, but once I have the report I can get a sense of if any of them look abnormal or need to be investigated.     3) Would keep an eye on throat symptoms, this could just be a typical sore throat/allergies.

## 2025-03-07 NOTE — TELEPHONE ENCOUNTER
Phoned patient and updated on below.    She verbalized understanding. States she previously had tried Propranolol with no side effects- wondering if she should try this instead of Metoprolol?    Patient would like to know from Dr. Torres any side effects specifically she should watch for with PTU? (Besides rash)    Patient also wants to note that her US thyroid was done today. They told her results should be back by 3pm.     Also wants to note that she has developed a slight cough and voice change, \"frog\" in her throat, sensation of having to clear her throat.

## 2025-03-14 NOTE — TELEPHONE ENCOUNTER
Hi, glad to hear PTU has not caused issues so far! Hopefully it stays that way. She can start propranolol whenever she feels better to see if that assists with any symptoms as well. I would repeat a first set of labs after at least 4 full weeks of PTU therapy; the 31st will be a little premature for labs (would probably have her do them closer to mid-April). If she likes, we can move back our appointment until mid-April to review labs and everything altogether.     Tepezza should not have any effect on our treatment plan; it is often used in conjunction with treatment of Graves' disease. Her ophthalmologist can guide her as to any possible side effects etc.

## 2025-03-14 NOTE — ED INITIAL ASSESSMENT (HPI)
Patient here with c/o on and off chest pain, cough, congestion and sore throat. States chest pain started on Wednesday but all her other symptoms started over a week ago.

## 2025-03-14 NOTE — ED PROVIDER NOTES
Patient Seen in: [facility]      History     Chief Complaint   Patient presents with    Chest Pain     On and off chest pain since Thursday, cough, congestion, swollen throat - Entered by patient    Cough     Stated Complaint: Chest Pain - On and off chest pain since Thursday, cough, congestion, swollen t*    Subjective:   34-year-old female presents today with history of URI symptoms and cough.  Start developing left chest pain.  Pain is worse with deep inspiration or coughing.  Denies having any shortness of breath or feeling of palpitations.  Does have a history of palpitations in the past.  Denies any fever chills no bodyaches joint pain.  Patient is alert oriented x 3.  No other symptoms or concerns.  The patient's medication list, past medical history and social history elements as listed in today's nurse's notes were reviewed and agreed (except as otherwise stated in the HPI).  The patient's family history reviewed and determined to be noncontributory to the presenting problem              Objective:     Past Medical History:    Acute blood loss anemia    Acute sinusitis, unspecified    Acute tonsillitis    Acute tonsillitis    Anemia    Anxiety    Bloating    Body piercing    Chronic tonsillitis    Constipation    Decorative tattoo    Diarrhea, unspecified    Easy bruising    Fatigue    Flatulence/gas pain/belching    Food intolerance    Frequent UTI    Graves disease    Heartburn    Heavy menses    High cholesterol    History of cold sores    History of mental disorder    Hyperemesis    Hypertriglyceridemia    Indigestion    Irregular bowel habits    Itch of skin    Menses painful    Nausea    Otalgia, unspecified    Other allergy, other than to medicinal agents    Pruritus, unspecified    Scoliosis (and kyphoscoliosis), idiopathic    Seasonal allergies    Sinus tachycardia    Skin blushing/flushing    Stool incontinence    Stress    Thyroid eye disease    Vomiting    Wears glasses    Weight  gain              Past Surgical History:   Procedure Laterality Date    Colposcopy, cervix w/upper adjacent vagina; w/biopsy(s), cervix Bilateral 01/01/2020    Tonsillectomy                  No pertinent social history.            Review of Systems    Positive for stated complaint: Chest Pain - On and off chest pain since Thursday, cough, congestion, swollen t*  Other systems are as noted in HPI.  Constitutional and vital signs reviewed.      All other systems reviewed and negative except as noted above.    Physical Exam     ED Triage Vitals [03/14/25 1300]   /88   Pulse 85   Resp 16   Temp 98.1 °F (36.7 °C)   Temp src Oral   SpO2 100 %   O2 Device None (Room air)       Current Vitals:   Vital Signs  BP: 129/88  Pulse: 85  Resp: 16  Temp: 98.1 °F (36.7 °C)  Temp src: Oral    Oxygen Therapy  SpO2: 100 %  O2 Device: None (Room air)        Physical Exam  Vitals and nursing note reviewed.   Constitutional:       Appearance: She is well-developed.   HENT:      Head: Normocephalic.      Right Ear: Tympanic membrane and ear canal normal.      Left Ear: Tympanic membrane and ear canal normal.      Nose: Mucosal edema present.      Mouth/Throat:      Mouth: Mucous membranes are moist.      Pharynx: Uvula midline. Posterior oropharyngeal erythema present.   Eyes:      Conjunctiva/sclera: Conjunctivae normal.      Pupils: Pupils are equal, round, and reactive to light.   Cardiovascular:      Rate and Rhythm: Normal rate and regular rhythm.   Pulmonary:      Effort: Pulmonary effort is normal.      Breath sounds: Normal breath sounds.   Musculoskeletal:      Cervical back: Normal range of motion and neck supple.   Lymphadenopathy:      Cervical: No cervical adenopathy.   Skin:     General: Skin is warm and dry.   Neurological:      Mental Status: She is alert and oriented to person, place, and time.             ED Course     Labs Reviewed   POCT RAPID STREP - Normal     EKG    Rate, intervals and axes as noted on EKG  Report.  Rate: 81  Rhythm: Sinus Rhythm  Reading: Normal sinus rhythm.  No ST elevation ischemic changes.  .  QRS 76.  QT\QTc 372\432                       MDM     Please note that this report has been produced using speech recognition software and may contain errors related to that system including, but not limited to, errors in grammar, punctuation, and spelling, as well as words and phrases that possibly may have been recognized inappropriately.  If there are any questions or concerns, contact the dictating provider for clarification.              Medical Decision Making  Differential diagnosis includes but is not limited to: Acute MI, pulmonary embolism, pleurisy, muscle strain, aneurysm, pneumonia, viral URI, COVID-19, influenza      Presented today with 1 week of URI symptoms and cough.  Start developing left chest pain.  Pain is exacerbated with deep inspiration or cough.  EKG was done showed normal sinus rhythm.  Chest x-ray shows no consolidation or acute findings.  Rapid strep was also done due to sore throat and was negative.  Do suspect viral cause of illness with possible pleurisy versus costochondritis versus bronchitis.  Will give her prescription for Medrol Dosepak to help with inflammation of the sinus throat ears and lungs.  Supportive care discussed.  To follow-up with her primary care physician in 1 week if symptoms do not improve.  Go directly to the ER with any labored breathing, uncontrolled worsening chest pain, or any worsening of symptoms.  Patient verbalized understanding and agreed to plan of care.    Amount and/or Complexity of Data Reviewed  Labs: ordered. Decision-making details documented in ED Course.     Details: Strep  Radiology: ordered and independent interpretation performed. Decision-making details documented in ED Course.     Details: Chest x-ray    Risk  OTC drugs.  Prescription drug management.        Disposition and Plan     Clinical Impression:  1. Acute chest pain     2. Viral URI with cough         Disposition:  Discharge  3/14/2025  2:31 pm    Follow-up:  Najma Jensen MD  [address] [address]  [phone]    In 1 week  As needed          Medications Prescribed:  Current Discharge Medication List        START taking these medications    Details   methylPREDNISolone (MEDROL) 4 MG Oral Tablet Therapy Pack Dosepack: take as directed  Qty: 1 each, Refills: 0                 Supplementary Documentation:

## 2025-03-19 NOTE — TELEPHONE ENCOUNTER
Patient due for labs  MCM sent to patient  Future Appointments   Date Time Provider Department Center   3/31/2025 11:00 AM Annetta Torres DO [id number]

## 2025-03-24 NOTE — TELEPHONE ENCOUNTER
Updated  patient labs with care everywhere. Routing patient message to provider.   MY CHART MESSAGE  sent to patient

## 2025-03-25 NOTE — TELEPHONE ENCOUNTER
Please inform the patient that it was suggested she have her labs done closer to mid-April to get stable levels after a full four weeks on PTU, as these were done a bit early it's hard to interpret them since free hormone levels fluctuate quite a bit in beginning stages of medication and many assays are unreliable (I would typically do T4 by dialysis to get the \"true\" free T4 level since many standard assays are prone to interference). Since her TSH is normalizing that is a good sign--and no, we would not change the medication dosage this early particularly when her TSH is still low-normal. I will add on a T4 by dialysis for her to do with her labs mid-April.

## 2025-03-31 NOTE — PROGRESS NOTES
Endocrinology Clinic Note    Name: Judi Garnica    Date: 3/31/2025     Patient verbally consents to a Video/Telephone service for this visit.  Patient understands and accepts financial responsibility for any deductible, co-insurance and/or co-pays associated with this service.    HISTORY OF PRESENT ILLNESS   Judi Garnica is a 34 year old female with PMHx significant for anxiety, hyperlipidemia, recent complicated pregnancy/delivery in  (complicated by gestational hypertension and complicated labor requiring unintended  with postop blood transfusion), iron deficiency anemia who presents for initial endocrine consultation for abnormal thyroid function.  The patient reports she developed severe fatigue several months after delivering her child in 2024, initial workup showed notable iron deficiency for which she was treated with iron infusions.  Fatigue persisted despite normalization of iron levels and further workup revealed low TSH first noted in 2024.  The patient also reports a constellation of symptoms including worsening anxiety, brain fog, insomnia, severe fatigue.  She has not been breast-feeding during this pregnancy but was initially able to produce breastmilk.  Reports she was treated with propranolol shortly after her delivery by her psychiatrist for anxiety.  No recent iodinated contrast. She does have occasional right-sided neck pain but denies dysphagia, dysphonia.  Low TSH has persisted on repeat labs in December and January.  She was seen by an endocrinologist in the [facility] system earlier this week who ordered additional labs which were done 2025 and showed persistently low TSH, low normal free T4 at 0.9, normal total T3 at 1.12, and mildly positive TSI and TRAb antibodies.  She reports that her primary care doctor did put her on methimazole after her first low TSH result, but she developed an itchy rash after 2 doses and subsequently  discontinued the medication.  She is not currently on any kind of beta-blockade.  Further data as below.    Interval history 3/31/25:  -Overall doing better, no rash with PTU  -Saw two ophthalmologists, initial physician suggested NITESH and Tepezza but second opinion felt NITESH was very mild and not warranting Tepezza right now, but observation  -Labs with other provider 3/24/25: TSH 0.802, FT4 0.7, FT3 2.4        PAST MEDICAL HISTORY:   Past Medical History:    Acute blood loss anemia    Acute sinusitis, unspecified    Acute tonsillitis    Acute tonsillitis    Anemia    Anxiety    Bloating    Body piercing    Chronic tonsillitis    Constipation    Decorative tattoo    Diarrhea, unspecified    Easy bruising    Fatigue    Flatulence/gas pain/belching    Food intolerance    Frequent UTI    Graves disease    Heartburn    Heavy menses    High cholesterol    History of cold sores    History of mental disorder    Hyperemesis    Hypertriglyceridemia    Indigestion    Irregular bowel habits    Itch of skin    Menses painful    Nausea    Otalgia, unspecified    Other allergy, other than to medicinal agents    Pruritus, unspecified    Scoliosis (and kyphoscoliosis), idiopathic    Seasonal allergies    Sinus tachycardia    Skin blushing/flushing    Stool incontinence    Stress    Thyroid eye disease    Vomiting    Wears glasses    Weight gain       PAST SURGICAL HISTORY:   Past Surgical History:   Procedure Laterality Date    Colposcopy, cervix w/upper adjacent vagina; w/biopsy(s), cervix Bilateral 01/01/2020    Tonsillectomy         CURRENT MEDICATIONS:    Current Outpatient Medications   Medication Sig Dispense Refill    methylPREDNISolone (MEDROL) 4 MG Oral Tablet Therapy Pack Dosepack: take as directed 1 each 0    propylthiouracil 50 MG Oral Tab Take 1 tablet (50 mg total) by mouth daily. 30 tablet 0    propranolol 10 MG Oral Tab Take 1 tablet (10 mg total) by mouth 3 (three) times daily. (Patient not taking: Reported on  3/14/2025) 90 tablet 1    metoprolol succinate ER 25 MG Oral Tablet 24 Hr Take 0.5 tablets (12.5 mg total) by mouth in the morning and 0.5 tablets (12.5 mg total) before bedtime. (Patient not taking: Reported on 3/14/2025) 90 tablet 0    valACYclovir 500 MG Oral Tab Take 1 tablet (500 mg total) by mouth 2 (two) times daily. (Patient not taking: Reported on 3/14/2025) 10 tablet 0    sertraline 50 MG Oral Tab        Endocrine Medications            propylthiouracil 50 MG Oral Tab            ALLERGIES:  Allergies[1]    SOCIAL HISTORY:    Social History     Socioeconomic History    Marital status:    Occupational History    Occupation:    Tobacco Use    Smoking status: Never    Smokeless tobacco: Never   Vaping Use    Vaping status: Never Used   Substance and Sexual Activity    Alcohol use: Not Currently     Comment: Very rarely    Drug use: Never    Sexual activity: Yes     Partners: Male     Birth control/protection: Condom   Other Topics Concern     Service No    Blood Transfusions No    Caffeine Concern Yes     Comment: 1 cup coffee daily    Sleep Concern No    Stress Concern No    Weight Concern No    Special Diet No    Exercise Yes     Comment: 3 x a week     Seat Belt Yes    Self-Exams Yes       FAMILY HISTORY:   Family History   Problem Relation Age of Onset    Arthritis Paternal Grandmother     Cancer Paternal Grandmother         lung, breast, and brain cancer    Breast Cancer Paternal Grandmother     Mental Disorder Paternal Grandmother     Diabetes Maternal Grandmother     Heart Disease Paternal Grandfather     Stroke Paternal Grandfather     Other (mental disability) Mother         suicide    Mental Disorder Mother     Other (mental disability) Maternal Grandfather     Heart Disorder Father     Diabetes Father     Heart Attack Father     Ovarian Cancer Maternal Aunt     Mental Disorder Sister          REVIEW OF SYSTEMS:  Ten point review of systems has been performed and is  otherwise negative and/or non-contributory, except as described above.      PHYSICAL EXAM- limited due to telemedicine encounter    Constitutional Not in acute distress  Pulmonary: no wheezing heard, no coughing on the phone. Speaking in full sentences.  Neurological:  Alert and oriented to person, place and time.   Psychiatric: Normal affect, mood and behavior appropriate        DATA:     Pertinent data reviewed 3/31/2025.    NM THYROID UPTAKE & SCAN (3/4/25):     COMPARISON: None available.     INDICATIONS: Subclinical hyperthyroidism; decreased appetite, fatigue, lethargy, and diaphoresis (E05.90).     TECHNIQUE: Radioactive iodine uptake and scan was performed after the oral administration of 314 microcuries of I-123 sodium iodide.  Images in the anterior and oblique views were taken at 6 hours.       FINDINGS:  UPTAKE (RAIU): 34.3%.  This is above the normal range of 6-24% at 6 hours.    THYROID SCAN: Heterogeneous-appearing thyroid gland with diffuse enlargement and intermixed probable hot and cold nodules.          Impression   CONCLUSION:  Enlarged, heterogeneous-appearing thyroid gland with increased radioiodine uptake, concerning for Graves disease.         ASSESSMENT AND PLAN:    #Subclinical hyperthyroidism due to Graves' disease  #History of drug reaction to thionamides  #Thyroid eye disease  -Extensively reviewed course to date in detail with the patient including thyroid physiology and pathophysiology  -Reviewed differential diagnosis of low TSH including Graves' disease (most likely the diagnosis given that TSI/TRAb are both positive), postpartum thyroiditis, central hypothyroidism in the context of peripartum bleeding (would not expect this so long after delivery)  -Patient with positive Graves' antibodies and uptake/scan in March 2025 consistent with Graves' disease, initially trialed on methimazole plus metoprolol however unfortunately had drug reaction/rash to both methimazole and  metoprolol  -Currently tolerating 50 mg daily of PTU, has moved notably toward euthyroidism.  TFTs were drawn slightly early, TSH has normalized -recommend continuing PTU for another 4 to 6 weeks and repeating labs at that time for adjustment.    -Continue close follow-up with ophthalmology, reviewed that MALHOTRA is contraindicated in patients with active NITESH  -All questions answered in detail    The above plan was discussed in detail with the patient who verbalized understanding and agreement.        Annetta Torres DO  [facility]  3/31/2025     Note to patient: The 21 Century Cures Act makes medical notes like these available to patients in the interest of transparency. However, be advised this is a medical document. It is intended as peer to peer communication. It is written in medical language and may contain abbreviations or verbiage that are unfamiliar. It may appear blunt or direct. Medical documents are intended to carry relevant information, facts as evident, and the clinical opinion of the practitioner.      In reviewing this note, please be advised that Dragon Voice Recognition software used to dictate the note may have made errors in recognizing some of the words or phrases.             [1]   Allergies  Allergen Reactions    Amoxicillin HIVES    Methimazole [Thiamazole] HIVES    Other OTHER (SEE COMMENTS)    Seasonal OTHER (SEE COMMENTS)

## 2025-04-02 NOTE — TELEPHONE ENCOUNTER
Endocrine refill protocol for medications for hypothyroidism and hyperthyroidism    Protocol Criteria:  FAILED Reason: Abnormal labs    If all below requirements are met, send a 90-day supply with 1 refill per provider protocol.    Verify appointment with Endocrinology completed in the last 12 months or scheduled in the next 6 months.    Normal TSH result in the past 12 months   Review recent telephone encounters and mychart communications with patient to ensure a dose change has not occurred since last office visit that was not updated in the medication history list     Last completed office visit:3/31/2025 Annetta Torres DO   Next scheduled Follow up:   Future Appointments   Date Time Provider Department Center   4/3/2025 12:45 PM Lonnie Peterson MD [facility]   4/7/2025  3:30 PM Britta Knight MD [id number]      Last TSH result:   TSH   Date Value Ref Range Status   01/16/2025 0.044 (L) 0.550 - 4.780 uIU/mL Final

## 2025-04-03 NOTE — PROGRESS NOTES
Subjective:  34 year old    Chief Complaint   Patient presents with    Pelvic Pain     Continual pelvic pain after pelvic floor therapy    Menstrual Problem     Irregular cycles 16-31 days  Also very strong cramps with periods     Patient complains of chronic pelvic discomfort since c section last March.  Discomfort deep in the pelvis.  Underwent 4 mos of physical therapy without improvement.  No GI or  complaints.  No dyspareunia.  Has pelvic pain for one week before and during menses.  Hx of primary dysmenorrhea in teens and twenties, treated with continuous OCP, which worked well.  Menses are approximately monthly, but varying by up to a week.  Duration 5 days, heavy for 3-4.  Patient using condoms for contraception.  Patient had a normal CT of abdomen/pelvis in  to rule out surgical complications.    Review of Systems:  Pertinent items are noted in the HPI.    Objective:  /78   Pulse 87   Ht 62\"   Wt 124 lb (56.2 kg)   LMP 2025 (Exact Date)   Physical Examination:  General appearance: Well dressed, well nourished in no apparent distress  Neurologic/Psychiatric: Alert and oriented to person, place and time, mood normal, affect appropriate  Abdomen: Soft, non-tender, non-distended, no masses, no hepatosplenomegaly, no hernias, no inguinal lymphadenopathy  Pelvic:    External genitalia- Normal, Bartholin's, urethra, skeins glands normal   Vagina- No vaginal lesions, no discharge   Cervix- No lesions, long/closed, no cervical motion tenderness   Uterus- Normal sized, anteverted, non-tender, no masses   Adnexa-  Non-tender, no masses    Assessment/Plan:  Chronic pelvic pain for one year since c section with distant history of primary dysmenorrhea.  Symptoms suspicious for endometriosis.  Reviewed diagnosis and treatment of endometriosis, including continuous OCP and [place].  Recommend check pelvic ultrasound.  Discussed option of diagnostic laparoscopy and limitations of that procedure.   [employer] pamphlets on laparoscopy, chronic pelvic pain, dysmenorrhea and endometriosis given.      Diagnoses and all orders for this visit:    Pelvic pain  -     US PELVIS W EV (CPT=76856/13969); Future    Dysmenorrhea  -     US PELVIS W EV (CPT=76856/98431); Future

## 2025-04-07 NOTE — PROGRESS NOTES
Otolaryngology Consultation Note     Reason for consultation: hoarseness, Graves disease  Consulting physician and service: Najma Jensen MD      HPI: 33 y/o F presents with neck swelling and hoarseness for the past few months. Recently diagnosed with Graves' disease, uncertain if related. Currently on PTU. Notes increased nasal congestion. Was diagnosed with an ear infection by Urgent Care a few weeks ago. Has some aural fullness in both ears. No dysphagia, odynophagia or difficulty breathing. No family h/o thyroid cancer but has family h/o thyroid disorders. No other complaints.      Past Medical History:   Past Medical History:    Acute blood loss anemia    Acute sinusitis, unspecified    Acute tonsillitis    Acute tonsillitis    Anemia    Anxiety    Bloating    Body piercing    Chronic tonsillitis    Constipation    Decorative tattoo    Diarrhea, unspecified    Easy bruising    Fatigue    Flatulence/gas pain/belching    Food intolerance    Frequent UTI    Graves disease    Heartburn    Heavy menses    High cholesterol    History of cold sores    History of mental disorder    Hyperemesis    Hypertriglyceridemia    Indigestion    Irregular bowel habits    Itch of skin    Menses painful    Nausea    Otalgia, unspecified    Other allergy, other than to medicinal agents    Pruritus, unspecified    Scoliosis (and kyphoscoliosis), idiopathic    Seasonal allergies    Sinus tachycardia    Skin blushing/flushing    Stool incontinence    Stress    Thyroid eye disease    Vomiting    Wears glasses    Weight gain        Past Surgical History:   Past Surgical History:   Procedure Laterality Date    Colposcopy, cervix w/upper adjacent vagina; w/biopsy(s), cervix Bilateral 01/01/2020    Tonsillectomy          Medication: Scheduled Meds:  Continuous Infusions:  PRN Meds:.     Allergies:  Allergies[1]  Pertinent Family History:   Family History   Problem Relation Age of Onset    Arthritis Paternal Grandmother     Cancer Paternal  Grandmother         lung, breast, and brain cancer    Breast Cancer Paternal Grandmother     Mental Disorder Paternal Grandmother     Diabetes Maternal Grandmother     Heart Disease Paternal Grandfather     Stroke Paternal Grandfather     Other (mental disability) Mother         suicide    Mental Disorder Mother     Other (mental disability) Maternal Grandfather     Heart Disorder Father     Diabetes Father     Heart Attack Father     Ovarian Cancer Maternal Aunt     Mental Disorder Sister         Pertinent Social History:   Social History     Socioeconomic History    Marital status:      Spouse name: Not on file    Number of children: Not on file    Years of education: Not on file    Highest education level: Not on file   Occupational History    Occupation:    Tobacco Use    Smoking status: Never    Smokeless tobacco: Never   Vaping Use    Vaping status: Never Used   Substance and Sexual Activity    Alcohol use: Not Currently     Comment: Very rarely    Drug use: Never    Sexual activity: Yes     Partners: Male     Birth control/protection: Condom   Other Topics Concern     Service No    Blood Transfusions No    Caffeine Concern Yes     Comment: 1 cup coffee daily    Occupational Exposure Not Asked    Hobby Hazards Not Asked    Sleep Concern No    Stress Concern No    Weight Concern No    Special Diet No    Back Care Not Asked    Exercise Yes     Comment: 3 x a week     Bike Helmet Not Asked    Seat Belt Yes    Self-Exams Yes   Social History Narrative    Not on file     Social Drivers of Health     Food Insecurity: No Food Insecurity (3/23/2024)    Food Insecurity     Food Insecurity: Never true   Transportation Needs: No Transportation Needs (3/23/2024)    Transportation Needs     Lack of Transportation: No   Stress: No Stress Concern Present (3/23/2024)    Stress     Feeling of Stress : No   Housing Stability: Low Risk  (3/23/2024)    Housing Stability     Housing Instability: No      Housing Instability Emergency: Not on file     Crib or Bassinette: Not on file        Review of Systems:  Constitutional: Negative.  HENT: see above  Eyes: Negative.  Respiratory: Negative.  Cardiovascular: Negative.  Gastrointestinal: Negative.  Musculoskeletal: Negative.  Skin: Negative.  Renal: Negative  Endocrine: Negative  Psychiatric/Behavioral: Negative.     Physical Examination:  Vitals: Last menstrual period 03/17/2025, not currently breastfeeding.     General: Breathing comfortably on room air while sitting up. Able to communicate verbally. Voice normal. Normal appearing body habitus.     Musculoskeletal: Head: Atraumatic and normocephalic.     Neck: Full ROM and able to extend without issues     Ears: External auditory canals clear with no evidence of significant cerumen or stenosis. Tympanic membranes visible with no evidence of retraction or perforation. No evidence of middle ear effusion bilaterally.     Nose: No sinus tenderness bilaterally upon palpation. No obvious nasal deformity. No masses, rhinorrhea, epistaxis. Nasal septum, mucosa, and turbinates appear normal.     Mouth/Throat: Salivary glands appear normal with no evidence of pain or mass. No masses or lesions noted within the oral mucosa, hard and soft palates, tongue, tonsils and posterior pharynx. Able to tolerate secretions. Oral cavity and oropharynx widely patent. Tonsils 1 plus and symmetric. Posterior pharyngeal walls appear normal. Thyroid non tender to palpation without evidence of mass or nodules.     Eyes: Extraocular movements intact and pupils equally reactive to light stimulus. No spontaneous or gaze-evoked nystagmus. No proptosis or ecchymosis. VFI.     Lymphatic: Bilateral high level II palpable lymph nodes, approximately 1.5 cm in size, soft, mobile, non-tender; No other significant cervical lymphadenopathy noted.     Neuro: CN 7 intact with symmetric mobility and strength. No loss of facial sensation.     Skin: Dry,  normal turgor, normal color.     Psych: Alert and oriented to person/place/time. Normal affect, amiable     Significant laboratory values: n/a     Imaging:   US Thyroid results reviewed    FINDINGS:    MEASUREMENTS:  RIGHT LOBE:  Measures 5.5 x 1.4 x 1.3 cm.     LEFT LOBE:  Measures 4.1 x 1.4 x 1 cm.     ISTHMUS:  Measures 0.3 cm.        OTHER:  None.                   Impression   CONCLUSION:  Moderate heterogeneous echogenicity suggesting micro nodular goiter versus thyroiditis.  No dominant nodule.        Procedures:   Otolaryngology Procedure     Patient name: Judi Garnica     YOB: 1990     Procedure: Flexible Fiberoptic Laryngoscopy     Indication: hoarseness     Anesthesia: Topical 1% lidocaine with oxymetazoline     Surgeon: Britta Knight MD     Procedure detail: Benefits and risks discussed with patient, which include bleeding, pain, infection, damage to surrounding structures. Agreement and informed consent obtained from patient. Patient, while sitting up, was anesthetized with topical anesthetic. This was allowed to sit for 5 minutes. A lubricated flexible fiberoptic laryngoscope was inserted through the nasal cavity to the level of the larynx with findings as follows: No masses or lesions in nasal cavities, nasopharynx, oropharynx, hypopharynx, and larynx. TVF mobile/symmetric bilaterally. Airway widely patent. Scope was then removed and patient tolerated to procedure well without complication.     Dispo: Patient instructed to follow up as instructed within assessment and plan portion of this notation.          Assessment/Plan:     Bilateral cervical LAD: likely secondary to previous URI in January. Will monitor for complete resolution. Recommend US neck to further evaluate if persists, increases in size and/or becomes more symptomatic. Patient agrees with this plan.   Hoarseness: no functional abnormality appreciated on FFL today. Encourage hydration.   Chronic rhinitis:  start Flonase daily.   Bilateral ETD: recommend self-insufflation prn. Start Flonase daily. Recommend OTC antihistamine prn.      Dr. Najma Jensen MD, thank you for involving me in this patient's care. Please contact me with further questions or concerns.    Britta Knight MD         [1]   Allergies  Allergen Reactions    Amoxicillin HIVES    Methimazole [Thiamazole] HIVES     Able to tolerate PTU    Seasonal OTHER (SEE COMMENTS)          Metoprolol RASH     Able to tolerate propranolol

## 2025-04-19 NOTE — TELEPHONE ENCOUNTER
Patient Endocrinologist ordered the TSH and free T4 which patient did do.  There is an order for thyroid lab from Jan 2025. Do they still need to do it?

## 2025-05-12 NOTE — PATIENT INSTRUCTIONS
General follow up information:  Please let us know if you require any refills at least 1 week prior to your medication running out. If you do run out of medication, please call our office ASAP to request refills (do not wait until your follow up).  Please call us if you experience any problems with insurance coverage of medication, lab work, or imaging.   Lab results and imaging will typically be reviewed at follow up appointments, or within 3-5 business days of ALL results being in if you do not have an appointment scheduled in the near future. Our office will contact you for any abnormal results requiring more urgent follow up or action.   The on-call pager is for urgent matters only. If you are a type 1 diabetic and run out of insulin after business hours 8AM-4PM, you may call the on-call pager for a refill to a 24 hour pharmacy. If you have adrenal insufficiency and run out of steroids, you may call the on-call pager for a refill to a 24 hour pharmacy. All other refill requests should be requested during business hours.    Return Visit   [] Dr. Torres in June/July if available, otherwise one of the other physicians

## 2025-05-12 NOTE — PROGRESS NOTES
Endocrinology Clinic Note    Name: Judi Garnica    Date: 2025     Patient verbally consents to a Video/Telephone service for this visit.  Patient understands and accepts financial responsibility for any deductible, co-insurance and/or co-pays associated with this service.    HISTORY OF PRESENT ILLNESS   Judi Garnica is a 34 year old female with PMHx significant for anxiety, hyperlipidemia, recent complicated pregnancy/delivery in  (complicated by gestational hypertension and complicated labor requiring unintended  with postop blood transfusion), iron deficiency anemia who presents for initial endocrine consultation for abnormal thyroid function.  The patient reports she developed severe fatigue several months after delivering her child in 2024, initial workup showed notable iron deficiency for which she was treated with iron infusions.  Fatigue persisted despite normalization of iron levels and further workup revealed low TSH first noted in 2024.  The patient also reports a constellation of symptoms including worsening anxiety, brain fog, insomnia, severe fatigue.  She has not been breast-feeding during this pregnancy but was initially able to produce breastmilk.  Reports she was treated with propranolol shortly after her delivery by her psychiatrist for anxiety.  No recent iodinated contrast. She does have occasional right-sided neck pain but denies dysphagia, dysphonia.  Low TSH has persisted on repeat labs in December and January.  She was seen by an endocrinologist in the [facility] system earlier this week who ordered additional labs which were done 2025 and showed persistently low TSH, low normal free T4 at 0.9, normal total T3 at 1.12, and mildly positive TSI and TRAb antibodies.  She reports that her primary care doctor did put her on methimazole after her first low TSH result, but she developed an itchy rash after 2 doses and subsequently  discontinued the medication.  She is not currently on any kind of beta-blockade.  Further data as below.    Interval history 4/2/25:  -Overall doing better, no rash with PTU  -Saw two ophthalmologists, initial physician suggested NITESH and Tepezza but second opinion felt NITESH was very mild and not warranting Tepezza right now, but observation  -Labs with other provider 3/24/25: TSH 0.802, FT4 0.7, FT3 2.4    Interval history 5/12/25:  -Repeat levels 4/18/25 on PTU 50mg daily: TSH 1.379, FT4 0.9, total T3 0.9  -Patient tolerating PTU well without side effects/allergy symptoms  -Observing vision/NITESH, next ophthalmology appointment in June 2025  -No new complaints/issues        PAST MEDICAL HISTORY:   Past Medical History:    Acute blood loss anemia    Acute sinusitis, unspecified    Acute tonsillitis    Acute tonsillitis    Anemia    Anxiety    Bloating    Body piercing    Chronic tonsillitis    Constipation    Decorative tattoo    Diarrhea, unspecified    Easy bruising    Fatigue    Flatulence/gas pain/belching    Food intolerance    Frequent UTI    Graves disease    Heartburn    Heavy menses    High cholesterol    History of cold sores    History of mental disorder    Hyperemesis    Hypertriglyceridemia    Indigestion    Irregular bowel habits    Itch of skin    Menses painful    Nausea    Otalgia, unspecified    Other allergy, other than to medicinal agents    Pruritus, unspecified    Scoliosis (and kyphoscoliosis), idiopathic    Seasonal allergies    Sinus tachycardia    Skin blushing/flushing    Stool incontinence    Stress    Thyroid eye disease    Vomiting    Wears glasses    Weight gain       PAST SURGICAL HISTORY:   Past Surgical History:   Procedure Laterality Date    Colposcopy, cervix w/upper adjacent vagina; w/biopsy(s), cervix Bilateral 01/01/2020    Tonsillectomy         CURRENT MEDICATIONS:    Current Outpatient Medications   Medication Sig Dispense Refill    fluticasone propionate 50 MCG/ACT Nasal  Suspension 2 sprays by Nasal route daily. 16 g 3    EYSUVIS 0.25 % Ophthalmic Suspension Instill 1 drop in both eyes twice daily apply coupon      propylthiouracil 50 MG Oral Tab TAKE 1 TABLET BY MOUTH EVERY DAY 90 tablet 1    valACYclovir 500 MG Oral Tab Take 1 tablet (500 mg total) by mouth 2 (two) times daily. 10 tablet 0    sertraline 50 MG Oral Tab        Endocrine Medications            propylthiouracil 50 MG Oral Tab            ALLERGIES:  Allergies[1]    SOCIAL HISTORY:    Social History     Socioeconomic History    Marital status:    Occupational History    Occupation:    Tobacco Use    Smoking status: Never    Smokeless tobacco: Never   Vaping Use    Vaping status: Never Used   Substance and Sexual Activity    Alcohol use: Not Currently     Comment: Very rarely    Drug use: Never    Sexual activity: Yes     Partners: Male     Birth control/protection: Condom   Other Topics Concern     Service No    Blood Transfusions No    Caffeine Concern Yes     Comment: 1 cup coffee daily    Sleep Concern No    Stress Concern No    Weight Concern No    Special Diet No    Exercise Yes     Comment: 3 x a week     Seat Belt Yes    Self-Exams Yes       FAMILY HISTORY:   Family History   Problem Relation Age of Onset    Arthritis Paternal Grandmother     Cancer Paternal Grandmother         lung, breast, and brain cancer    Breast Cancer Paternal Grandmother     Mental Disorder Paternal Grandmother     Diabetes Maternal Grandmother     Heart Disease Paternal Grandfather     Stroke Paternal Grandfather     Other (mental disability) Mother         suicide    Mental Disorder Mother     Other (mental disability) Maternal Grandfather     Heart Disorder Father     Diabetes Father     Heart Attack Father     Ovarian Cancer Maternal Aunt     Mental Disorder Sister          REVIEW OF SYSTEMS:  Ten point review of systems has been performed and is otherwise negative and/or non-contributory, except as  described above.      PHYSICAL EXAM- limited due to telemedicine encounter     Constitutional Not in acute distress  Pulmonary: no wheezing heard, no coughing on the phone. Speaking in full sentences.  Neurological:  Alert and oriented to person, place and time.   Psychiatric: Normal affect, mood and behavior appropriate      DATA:     Pertinent data reviewed 5/12/2025.    NM THYROID UPTAKE & SCAN (3/4/25):     COMPARISON: None available.     INDICATIONS: Subclinical hyperthyroidism; decreased appetite, fatigue, lethargy, and diaphoresis (E05.90).     TECHNIQUE: Radioactive iodine uptake and scan was performed after the oral administration of 314 microcuries of I-123 sodium iodide.  Images in the anterior and oblique views were taken at 6 hours.       FINDINGS:  UPTAKE (RAIU): 34.3%.  This is above the normal range of 6-24% at 6 hours.    THYROID SCAN: Heterogeneous-appearing thyroid gland with diffuse enlargement and intermixed probable hot and cold nodules.           Impression   CONCLUSION:  Enlarged, heterogeneous-appearing thyroid gland with increased radioiodine uptake, concerning for Graves disease.       ASSESSMENT AND PLAN:    #Subclinical hyperthyroidism due to Graves' disease  #History of drug reaction to thionamides  #Thyroid eye disease  -Extensively reviewed course to date in detail with the patient including thyroid physiology and pathophysiology  -Reviewed differential diagnosis of low TSH including Graves' disease (most likely the diagnosis given that TSI/TRAb are both positive), postpartum thyroiditis, central hypothyroidism in the context of peripartum bleeding (would not expect this so long after delivery)  -Patient with positive Graves' antibodies and uptake/scan in March 2025 consistent with Graves' disease, initially trialed on methimazole plus metoprolol however unfortunately had drug reaction/rash to both methimazole and metoprolol  -Currently tolerating 50 mg daily of PTU without issue and  achieved biochemical euthyroidism in April 2025  -She was evaluated by 2 ophthalmologists who both agreed that the patient has mild thyroid eye disease, one provider suggested consideration of Tepezza but second provider suggested simple observation and patient plans to continue close observation.  Tepezza was not started.  She has follow-up with ophthalmology in June 2025.  -Radioactive iodine is contraindicated in the context of active thyroid eye disease  -Reviewed that definitive therapy therefore would need to be completed with total thyroidectomy in the event that she was unable to tolerate medical management or preferred to discontinue ATD, at this point as she has responded well to low-dose PTU and I recommend continuing current medication and observing for achievement of remission.  Reviewed that remission can take 12 to 18 months or more in certain patients.  Patient is agreeable to this plan, prefers to avoid surgery if possible.  -If antibody levels are favorable, consider weaning or discontinuing PTU as appropriate to see if remission can be achieved.  -All questions answered in detail    The above plan was discussed in detail with the patient who verbalized understanding and agreement.      Annetta Torres DO  [facility]  5/12/25     Note to patient: The 21 Century Cures Act makes medical notes like these available to patients in the interest of transparency. However, be advised this is a medical document. It is intended as peer to peer communication. It is written in medical language and may contain abbreviations or verbiage that are unfamiliar. It may appear blunt or direct. Medical documents are intended to carry relevant information, facts as evident, and the clinical opinion of the practitioner.      In reviewing this note, please be advised that Dragon Voice Recognition software used to dictate the note may have made errors in recognizing some of the words or phrases.             [1]    Allergies  Allergen Reactions    Amoxicillin HIVES    Methimazole [Thiamazole] HIVES     Able to tolerate PTU    Seasonal OTHER (SEE COMMENTS)          Metoprolol RASH     Able to tolerate propranolol

## 2025-05-28 NOTE — PROGRESS NOTES
Please schedule patient in September with one of the other doctors while I am on maternity leave for lab review, thanks!

## 2025-06-10 NOTE — TELEPHONE ENCOUNTER
Hi, sorry to hear she's having these symptoms. We can recheck thyroid function though it would be unusual for it to change dramatically within a few weeks. With regard to iron and testing for joint pains, bruising, autoimmune disease, irritability, etc would recommend she discuss these with her primary care physician - they should be able to examine her and determine if she needs to see another specialist such as a rheumatologist. She did previously see hematology and can reach out to them to see if they suggest any particular testing for iron or bruising. I will touch base with her once her thyroid labs are in.

## 2025-06-11 NOTE — TELEPHONE ENCOUNTER
Future Appointments   Date Time Provider Department Center   6/17/2025 12:40 PM Najma Jensen MD [facility] [place]   9/12/2025  4:00 PM Evelyn Medina MD [id number] [facility]

## 2025-06-11 NOTE — TELEPHONE ENCOUNTER
Last office visit with JESUSITA Lou on 1/16/2025 for fatigue    Labs done yesterday for endocrinologist and hematologist.    Please advise

## 2025-06-12 NOTE — TELEPHONE ENCOUNTER
Rescheduled   Future Appointments   Date Time Provider Department Center   6/12/2025  2:20 PM Palak Fletcher APRN [facility] [place]   9/12/2025  4:00 PM Evelyn Medina MD [id number] [facility] [place]

## 2025-06-12 NOTE — TELEPHONE ENCOUNTER
Patient scheduled a virtual visit with dr wood for   \"Continued fatigue and autoimmune markers\"  Ok to keep as virtual?   Future Appointments   Date Time Provider Department Center   6/17/2025 12:40 PM Najma Wood MD [facility] [place]   9/12/2025  4:00 PM Evelyn Medina MD [id number] [facility]

## 2025-06-12 NOTE — PROGRESS NOTES
HPI:     Patient is following up for ongoing fatigue. Diagnosed with Graves in January. Started with extreme exhaustion. Now her thyroid levels are back in the normal range. She was feeling better until last Wednesday. Since then she feeling extreme exhaustion again. Sleeping 8-9 hours and it is very restful sleep. 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 Less stress. She has also noticed increased irritability but doesn't feel anxious or depressed.     Current Medications[1]   Past Medical History[2]   Past Surgical History[3]   Family History[4]   Short Social Hx on File[5]      REVIEW OF SYSTEMS:   Review of Systems   Constitutional:  Positive for fatigue. Negative for appetite change (eating gluten free at recommendation of functional medicine), fever and unexpected weight change.   Respiratory:  Negative for cough.    Cardiovascular:  Negative for chest pain.   Psychiatric/Behavioral:  Positive for agitation. Negative for dysphoric mood and sleep disturbance. The patient is not nervous/anxious.        EXAM:   /76   Pulse 87   Temp 97.7 °F (36.5 °C)   Ht 5' 2\" (1.575 m)   Wt 123 lb (55.8 kg)   LMP 05/25/2025 (Exact Date)   SpO2 98%   BMI 22.50 kg/m²   Physical Exam  Constitutional:       General: She is not in acute distress.     Appearance: Normal appearance. She is normal weight.   Cardiovascular:      Rate and Rhythm: Normal rate and regular rhythm.      Heart sounds: Normal heart sounds.   Pulmonary:      Effort: Pulmonary effort is normal.      Breath sounds: Normal breath sounds.   Neurological:      Mental Status: She is alert.         ASSESSMENT AND PLAN:   Diagnoses and all orders for this visit:    Chronic fatigue  -     Connective Tissue Disease (PATTI) Screen, Reflex Specific Antibody ([place]); Future  -     Rheumatoid Arthritis Factor; Future  -     Cyclic Citrullinate Peptide (CCP) antibodies; Future  -     HLA B 27 Disease Association; Future  -     C-Reactive Protein [E];  Future  -     Hemoglobin A1C [E]; Future  -     Mono Qual, reflex to EBV on Neg [E]; Future  -     Lyme Disease(B.[place]dorferi)Pcr [E]; Future  -     Rheumatology Referral - In Network    Irritability  -     C-Reactive Protein [E]; Future  -     Hemoglobin A1C [E]; Future  -     Mono Qual, reflex to EBV on Neg [E]; Future  -     Lyme Disease(B.[place]dorferi)Pcr [E]; Future  -     Rheumatology Referral - In Network    Graves disease  Comments:  following with endo, recent lab work stable in May  Orders:  -     Connective Tissue Disease (PATTI) Screen, Reflex Specific Antibody ([place]); Future  -     Rheumatoid Arthritis Factor; Future  -     Cyclic Citrullinate Peptide (CCP) antibodies; Future  -     HLA B 27 Disease Association; Future  -     C-Reactive Protein [E]; Future  -     Mono Qual, reflex to EBV on Neg [E]; Future  -     Lyme Disease(B.[place]dorferi)Pcr [E]; Future  -     Rheumatology Referral - In Network    Low iron  Comments:  following with heme. Recent levels improved, no need for IV iron at this time. Will be checking again in 4-6 weeks    Dysmenorrhea    Chronic pelvic pain in female  -     Rheumatology Referral - In Network    Able to add additional lab work to recently drawn blood  Recommend seeing rheumatology, referral placed    Note to patient: The 21st Century Cures Act makes medical notes like these available to patients in the interest of transparency. However, be advised this is a medical document. It is intended as peer to peer communication. It is written in medical language and may contain abbreviations or verbiage that are unfamiliar. It may appear blunt or direct. Medical documents are intended to carry relevant information, facts as evident, and the clinical opinion of the practitioner.           [1]   Current Outpatient Medications   Medication Sig Dispense Refill    ALPRAZolam 0.5 MG Oral Tab Take 1 -1 oral tablet once a day as needed for anxiety or sleep      cyclobenzaprine 5 MG  Oral Tab Take 0.5-1 tablets (2.5-5 mg total) by mouth 2 (two) times daily as needed.      propylthiouracil 50 MG Oral Tab Take 1 tablet (50 mg total) by mouth daily. 90 tablet 1    EYSUVIS 0.25 % Ophthalmic Suspension Instill 1 drop in both eyes twice daily apply coupon      sertraline 50 MG Oral Tab       valACYclovir 500 MG Oral Tab Take 1 tablet (500 mg total) by mouth 2 (two) times daily. (Patient not taking: Reported on 6/12/2025) 10 tablet 0   [2]   Past Medical History:   Acute blood loss anemia    Acute sinusitis, unspecified    Acute tonsillitis    Acute tonsillitis    Anemia    Anxiety    Bloating    Body piercing    Chronic tonsillitis    Constipation    Decorative tattoo    Diarrhea, unspecified    Easy bruising    Fatigue    Flatulence/gas pain/belching    Food intolerance    Frequent UTI    Graves disease    Heartburn    Heavy menses    High cholesterol    History of cold sores    History of mental disorder    Hyperemesis    Hypertriglyceridemia    Indigestion    Irregular bowel habits    Itch of skin    Menses painful    Nausea    Otalgia, unspecified    Other allergy, other than to medicinal agents    Pruritus, unspecified    Scoliosis (and kyphoscoliosis), idiopathic    Seasonal allergies    Sinus tachycardia    Skin blushing/flushing    Stool incontinence    Stress    Thyroid eye disease    Vomiting    Wears glasses    Weight gain   [3]   Past Surgical History:  Procedure Laterality Date    Colposcopy, cervix w/upper adjacent vagina; w/biopsy(s), cervix Bilateral 01/01/2020    Tonsillectomy     [4]   Family History  Problem Relation Age of Onset    Arthritis Paternal Grandmother     Cancer Paternal Grandmother         lung, breast, and brain cancer    Breast Cancer Paternal Grandmother     Mental Disorder Paternal Grandmother     Diabetes Maternal Grandmother     Heart Disease Paternal Grandfather     Stroke Paternal Grandfather     Other (mental disability) Mother         suicide    Mental  Disorder Mother     Other (mental disability) Maternal Grandfather     Heart Disorder Father     Diabetes Father     Heart Attack Father     Ovarian Cancer Maternal Aunt     Mental Disorder Sister    [5]   Social History  Socioeconomic History    Marital status:    Occupational History    Occupation:    Tobacco Use    Smoking status: Never    Smokeless tobacco: Never   Vaping Use    Vaping status: Never Used   Substance and Sexual Activity    Alcohol use: Not Currently     Comment: Very rarely    Drug use: Never    Sexual activity: Yes     Partners: Male     Birth control/protection: Condom   Other Topics Concern     Service No    Blood Transfusions No    Caffeine Concern Yes     Comment: 1 cup coffee daily    Sleep Concern No    Stress Concern No    Weight Concern No    Special Diet No    Exercise Yes     Comment: 3 x a week     Seat Belt Yes    Self-Exams Yes     Social Drivers of Health     Food Insecurity: No Food Insecurity (3/23/2024)    Food Insecurity     Food Insecurity: Never true   Transportation Needs: No Transportation Needs (3/23/2024)    Transportation Needs     Lack of Transportation: No   Stress: No Stress Concern Present (3/23/2024)    Stress     Feeling of Stress : No   Housing Stability: Low Risk  (3/23/2024)    Housing Stability     Housing Instability: No

## 2025-06-17 NOTE — TELEPHONE ENCOUNTER
----- Message from Palak Fletcher sent at 6/17/2025  1:19 PM CDT -----  Results reviewed.   Autoimmune screen negative  ----- Message -----  From: Lab, Background User  Sent: 6/12/2025   2:56 PM CDT  To: Palak Fletcher, JESUSITA

## 2025-06-17 NOTE — TELEPHONE ENCOUNTER
6/17/2025  1:19 PM Y JESUSITA Gannon Test Results Message  Message about your results     Patient viewed Joox message

## 2025-06-18 NOTE — TELEPHONE ENCOUNTER
Received a call from patient stating that she just her thyroid labs completed today by functional medicine doctor - Free T4 came back low.     Lab results from today as stated per patient:    Free T4 - 0.5   TSH -  2.809 normal  T3 -  2.9 normal    Confirmed that she was not taking any biotin containing supplements, currently on propylthiouracil 50mg oral tab daily. Patient confirms that she was taking propylthiouracil every morning on empty stomach without missing any doses.     Patient states that she had a huge drop in energy starting June 9th.   Also had labs done on June 10th.     Patient leaving out of town tomorrow - will be away for 5 days. Would really like to take care of it today. Routed for covering provider.

## 2025-06-18 NOTE — TELEPHONE ENCOUNTER
----- Message from Palak Fletcher sent at 6/18/2025  9:49 AM CDT -----  Results reviewed.   Negative HLA B27 screening   ----- Message -----  From: Lab, Background User  Sent: 6/12/2025   2:56 PM CDT  To: JESUSITA Gannon

## 2025-06-18 NOTE — TELEPHONE ENCOUNTER
Phoned patient to review Dr. Huber's recommendations below. Patient verbalized understanding to all. Asked if patient needs another prescription for PTU 25 mg, patient confirmed that she's able to break 50mg tablet into half.

## 2025-06-19 NOTE — TELEPHONE ENCOUNTER
Blood transfusion stopped at 1710 d/t infiltrated PIVs    Replaced PIV Blood transfusion started at 1720   FT4 is a highly variable level in the blood and her TSH remaining normal is reassuring. I would be cautious about having the thyroid levels done so often between two different office. That said I agree that Dr. Huber's recommendation to alternate full/half tabs for a few weeks is reasonable though I do not believe her symptoms would be related to a slightly low T4 with normal TSH. She can repeat TFTs (TSH/FT4/T4 by dialysis) along with TSI/TRAb in 4-6 weeks (these are all already standing aside from T4 by dialysis).     As discussed before many patients have antibodies for both Hashimoto's and Graves' and though rare patients can swing between the two over the course of a lifetime, you cannot simultaneously \"express\" both diseases. The thyroid is either overactive or underactive at a given time. If a person with Graves presents with abnormal labs, it is likely medication related (meds being too strong or weak) rather than now \"having\" the other disease.

## 2025-07-12 NOTE — TELEPHONE ENCOUNTER
Labs due  Mychart message sent  Future Appointments   Date Time Provider Department Center   9/12/2025  4:00 PM Evelyn Medina MD [id number]   11/12/2025 11:00 AM Pam Del Valle MD [id number] [address]

## 2025-07-16 NOTE — TELEPHONE ENCOUNTER
Received a call from patient, reviewed Dr. Torres's message bellow - verbalized understanding to all.

## (undated) NOTE — LETTER
Judi Garnica, :1990    CONSENT FOR PROCEDURE/SEDATION    1. I authorize the performance upon Judi Garnica  the following: Colposcopy with biopsy and Endocervical curettage    2. I authorize Dr. Erica Barnes MD (and whomever is designated as the doctor’s assistant), to perform the above-mentioned procedures.    3. If any unforeseen conditions arise during this procedure calling for additional  procedures, operations, or medications (including anesthesia and blood transfusion), I further request and authorize the doctor to do whatever he/she deems advisable in my interest.    4. I consent to the taking and reproduction of any photographs in the course of this procedure for professional purposes.    5. I consent to the administration of such sedation as may be considered necessary or advisable by the physician responsible for this service, with the exception of ______________________________________________________    6. I have been informed by my doctor of the nature and purpose of this procedure sedation, possible alternative methods of treatment, risk involved and possible complications.    7. If I have a Do Not Resuscitate (DNR) order in place, the physician and I (or the individual authorized to consent on my behalf) will discuss and agree as to whether the Do Not Resuscitate (DNR) order will remain in effect or will be discontinued during the performance of the procedure and the applicable recovery period. If the Do Not Resuscitate (DNR) order is discontinued and is to be reinstated following the procedure/recovery period, the physician will determine when the applicable recovery period ends for purposes of reinstating the Do Not Resuscitate (DNR) order.    Signature of Patient:_______________________________________________    Signature of person authorized to consent for patient:  _______________________________________________________________    Relationship to patient:  ____________________________________________    Witness: _________________________________________ Date:___________     Physician Signature: _______________________________ Date:___________

## (undated) NOTE — LETTER
04/10/24      Judi Garnica        :  1990        To Whom It May Concern:    Judi Garnica has delivered by  on 3/25/2024. Judi will be evaluated in our office on 2024 to determine when she may return to work without restrictions.    If you have any questions, please do not hesitate to contact our office at any time.    Sincerely,        Lizeth Voss MD

## (undated) NOTE — LETTER
Date: 2/24/2020    Patient Name: Lenore Raza          To Whom it may concern: This letter has been written at the patient's request. The above patient was seen at the [facility] for treatment of a medical condition.     The patient

## (undated) NOTE — LETTER
24      Judi Garnica        :  1990        To Whom It May Concern:    Judi Garnica  has recently been treated for a medical condition. At this time, I have determined she may return to work effective 2024, without restrictions.    If you have any questions, please do not hesitate to contact our office at any time.    Sincerely,        Teresa Means MD

## (undated) NOTE — LETTER
23    Re: Fei Lopez  : 1990    To Whom It May Concern:  Fei Lopez is under my care for pregnancy with an Estimated Date of Delivery: 24. If you have any questions concerning this letter, please feel free to contact my office.       Sincerely yours,    JESUSITA Xiong

## (undated) NOTE — MR AVS SNAPSHOT
[facility] [place] [facility], [address] [id number]               Thank you for choosing us for your health care visit with Kanwal Malcolm MD.  We are glad to serve you and happy to provide you with this bello Today's Vital Signs     BP Pulse Temp Height Weight BMI    118/70 mmHg 88 97.7 °F (36.5 °C) 62\" 120 lb 21.94 kg/m2         Current Medications          This list is accurate as of: 5/2/17  6:26 PM.  Always use your most recent med list.                Marjorie Cordon

## (undated) NOTE — LETTER
24        RE:  Judi Garnica  :  1990      To Whom It May Concern:    Judi Garnica  is under my care for pregnancy with an estimated delivery date of Estimated Date of Delivery: 24      Due to the advanced nature of her pregnancy and concern for complications, it is strongly recommended that  Judi Garnica be excused from work to receive intravenous fluids up to three times a week for the duration of her pregnancy.     If you have any questions concerning this letter, do not hesitate to contact my office at any time.    Sincerely,        Finn Kelley MD

## (undated) NOTE — Clinical Note
Dear Dr. Arvind Alonso,       Thank you for referring Suki Jaffe to the [facility].   Sincerely,  DONALD Elliott